# Patient Record
Sex: FEMALE | Race: WHITE | NOT HISPANIC OR LATINO | Employment: UNEMPLOYED | URBAN - NONMETROPOLITAN AREA
[De-identification: names, ages, dates, MRNs, and addresses within clinical notes are randomized per-mention and may not be internally consistent; named-entity substitution may affect disease eponyms.]

---

## 2021-03-10 ENCOUNTER — OUTSIDE FACILITY SERVICE (OUTPATIENT)
Dept: CARDIOLOGY | Facility: CLINIC | Age: 61
End: 2021-03-10

## 2021-03-10 PROCEDURE — 93018 CV STRESS TEST I&R ONLY: CPT | Performed by: INTERNAL MEDICINE

## 2021-03-24 ENCOUNTER — OFFICE VISIT (OUTPATIENT)
Dept: CARDIOLOGY | Facility: CLINIC | Age: 61
End: 2021-03-24

## 2021-03-24 VITALS
SYSTOLIC BLOOD PRESSURE: 174 MMHG | OXYGEN SATURATION: 98 % | DIASTOLIC BLOOD PRESSURE: 86 MMHG | BODY MASS INDEX: 30.83 KG/M2 | HEIGHT: 63 IN | WEIGHT: 174 LBS | HEART RATE: 88 BPM

## 2021-03-24 DIAGNOSIS — R07.2 PRECORDIAL PAIN: ICD-10-CM

## 2021-03-24 DIAGNOSIS — R00.2 PALPITATIONS: ICD-10-CM

## 2021-03-24 DIAGNOSIS — R42 DIZZY: ICD-10-CM

## 2021-03-24 DIAGNOSIS — I10 ESSENTIAL HYPERTENSION: Primary | ICD-10-CM

## 2021-03-24 PROBLEM — E53.8 COBALAMIN DEFICIENCY: Status: ACTIVE | Noted: 2021-03-24

## 2021-03-24 PROBLEM — E55.9 VITAMIN D DEFICIENCY: Status: ACTIVE | Noted: 2021-03-24

## 2021-03-24 PROBLEM — E78.2 MIXED HYPERLIPIDEMIA: Status: ACTIVE | Noted: 2021-03-24

## 2021-03-24 PROBLEM — E11.9 DIABETES MELLITUS WITHOUT COMPLICATION (HCC): Status: ACTIVE | Noted: 2019-08-28

## 2021-03-24 PROCEDURE — 99204 OFFICE O/P NEW MOD 45 MIN: CPT | Performed by: NURSE PRACTITIONER

## 2021-03-24 RX ORDER — NITROGLYCERIN 0.4 MG/1
TABLET SUBLINGUAL
Qty: 30 TABLET | Refills: 5 | Status: SHIPPED | OUTPATIENT
Start: 2021-03-24 | End: 2022-05-27 | Stop reason: SDUPTHER

## 2021-03-24 RX ORDER — POTASSIUM CHLORIDE 20 MEQ/1
TABLET, EXTENDED RELEASE ORAL
COMMUNITY
Start: 2021-01-04 | End: 2021-03-24

## 2021-03-24 RX ORDER — FUROSEMIDE 40 MG/1
40 TABLET ORAL AS NEEDED
COMMUNITY
Start: 2021-02-03 | End: 2021-05-24

## 2021-03-24 RX ORDER — CYCLOBENZAPRINE HCL 10 MG
TABLET ORAL DAILY
COMMUNITY
Start: 2021-02-03 | End: 2021-03-24 | Stop reason: SDUPTHER

## 2021-03-24 RX ORDER — ASPIRIN 81 MG/1
TABLET ORAL
COMMUNITY
Start: 2021-02-03 | End: 2021-06-04 | Stop reason: SDUPTHER

## 2021-03-24 RX ORDER — BACLOFEN 20 MG
TABLET ORAL
COMMUNITY
Start: 2021-01-04 | End: 2021-03-24

## 2021-03-24 RX ORDER — ERGOCALCIFEROL 1.25 MG/1
CAPSULE ORAL
COMMUNITY
Start: 2021-02-03 | End: 2021-03-24 | Stop reason: ALTCHOICE

## 2021-03-24 RX ORDER — SERTRALINE HYDROCHLORIDE 25 MG/1
25 TABLET, FILM COATED ORAL DAILY
COMMUNITY
End: 2021-06-04 | Stop reason: SDUPTHER

## 2021-03-24 RX ORDER — GABAPENTIN 300 MG/1
300 CAPSULE ORAL 2 TIMES DAILY
COMMUNITY
End: 2021-06-04 | Stop reason: SDUPTHER

## 2021-03-24 RX ORDER — ERGOCALCIFEROL 1.25 MG/1
50000 CAPSULE ORAL
COMMUNITY
End: 2021-05-24

## 2021-03-24 RX ORDER — ROSUVASTATIN CALCIUM 10 MG/1
10 TABLET, COATED ORAL
COMMUNITY
Start: 2021-02-03 | End: 2021-05-24 | Stop reason: SDUPTHER

## 2021-03-24 RX ORDER — FUROSEMIDE 40 MG/1
TABLET ORAL DAILY
COMMUNITY
Start: 2021-02-03 | End: 2021-03-24 | Stop reason: SDUPTHER

## 2021-03-24 RX ORDER — METOPROLOL SUCCINATE 25 MG/1
25 TABLET, EXTENDED RELEASE ORAL DAILY
Qty: 30 TABLET | Refills: 11 | Status: SHIPPED | OUTPATIENT
Start: 2021-03-24 | End: 2021-06-04 | Stop reason: SDUPTHER

## 2021-03-24 RX ORDER — AMLODIPINE BESYLATE 10 MG/1
10 TABLET ORAL DAILY
COMMUNITY
End: 2021-06-04 | Stop reason: SDUPTHER

## 2021-03-24 RX ORDER — MELOXICAM 7.5 MG/1
TABLET ORAL
COMMUNITY
Start: 2021-02-03 | End: 2021-03-24

## 2021-03-24 RX ORDER — MELOXICAM 7.5 MG/1
TABLET ORAL
COMMUNITY
Start: 2021-02-03 | End: 2021-03-24 | Stop reason: SDUPTHER

## 2021-03-24 RX ORDER — LOSARTAN POTASSIUM AND HYDROCHLOROTHIAZIDE 25; 100 MG/1; MG/1
TABLET ORAL
COMMUNITY
Start: 2021-02-03 | End: 2021-03-24 | Stop reason: SDUPTHER

## 2021-03-24 RX ORDER — ISOSORBIDE MONONITRATE 30 MG/1
30 TABLET, EXTENDED RELEASE ORAL DAILY
Qty: 30 TABLET | Refills: 11 | Status: SHIPPED | OUTPATIENT
Start: 2021-03-24 | End: 2021-04-01

## 2021-03-24 RX ORDER — UBIDECARENONE 75 MG
50 CAPSULE ORAL DAILY
COMMUNITY
End: 2021-05-24

## 2021-03-24 RX ORDER — GLIMEPIRIDE 2 MG/1
TABLET ORAL
COMMUNITY
Start: 2021-02-03 | End: 2021-03-24 | Stop reason: SDUPTHER

## 2021-03-24 RX ORDER — LOSARTAN POTASSIUM AND HYDROCHLOROTHIAZIDE 25; 100 MG/1; MG/1
1 TABLET ORAL DAILY
COMMUNITY
Start: 2021-02-03 | End: 2021-06-04 | Stop reason: SDUPTHER

## 2021-03-24 RX ORDER — GLIMEPIRIDE 2 MG/1
TABLET ORAL EVERY 12 HOURS
COMMUNITY
Start: 2021-02-03 | End: 2021-05-24

## 2021-03-24 RX ORDER — CYCLOBENZAPRINE HCL 10 MG
10 TABLET ORAL NIGHTLY
COMMUNITY
Start: 2021-02-03 | End: 2021-06-04

## 2021-03-24 RX ORDER — VITAMIN B COMPLEX
TABLET ORAL
COMMUNITY
Start: 2021-02-03 | End: 2021-03-24

## 2021-03-24 RX ORDER — OMEPRAZOLE 40 MG/1
1 CAPSULE, DELAYED RELEASE ORAL
COMMUNITY
Start: 2021-02-03 | End: 2021-06-04 | Stop reason: SDUPTHER

## 2021-03-24 RX ORDER — MELATONIN
1000 DAILY
COMMUNITY
End: 2021-05-24

## 2021-03-24 NOTE — PATIENT INSTRUCTIONS
"Fat and Cholesterol Restricted Eating Plan  Getting too much fat and cholesterol in your diet may cause health problems. Choosing the right foods helps keep your fat and cholesterol at normal levels. This can keep you from getting certain diseases.  Your doctor may recommend an eating plan that includes:  · Total fat: ______% or less of total calories a day.  · Saturated fat: ______% or less of total calories a day.  · Cholesterol: less than _________mg a day.  · Fiber: ______g a day.  What are tips for following this plan?  Meal planning  · At meals, divide your plate into four equal parts:  ? Fill one-half of your plate with vegetables and green salads.  ? Fill one-fourth of your plate with whole grains.  ? Fill one-fourth of your plate with low-fat (lean) protein foods.  · Eat fish that is high in omega-3 fats at least two times a week. This includes mackerel, tuna, sardines, and salmon.  · Eat foods that are high in fiber, such as whole grains, beans, apples, broccoli, carrots, peas, and barley.  General tips    · Work with your doctor to lose weight if you need to.  · Avoid:  ? Foods with added sugar.  ? Fried foods.  ? Foods with partially hydrogenated oils.  · Limit alcohol intake to no more than 1 drink a day for nonpregnant women and 2 drinks a day for men. One drink equals 12 oz of beer, 5 oz of wine, or 1½ oz of hard liquor.  Reading food labels  · Check food labels for:  ? Trans fats.  ? Partially hydrogenated oils.  ? Saturated fat (g) in each serving.  ? Cholesterol (mg) in each serving.  ? Fiber (g) in each serving.  · Choose foods with healthy fats, such as:  ? Monounsaturated fats.  ? Polyunsaturated fats.  ? Omega-3 fats.  · Choose grain products that have whole grains. Look for the word \"whole\" as the first word in the ingredient list.  Cooking  · Cook foods using low-fat methods. These include baking, boiling, grilling, and broiling.  · Eat more home-cooked foods. Eat at restaurants and buffets " less often.  · Avoid cooking using saturated fats, such as butter, cream, palm oil, palm kernel oil, and coconut oil.  Recommended foods    Fruits  · All fresh, canned (in natural juice), or frozen fruits.  Vegetables  · Fresh or frozen vegetables (raw, steamed, roasted, or grilled). Green salads.  Grains  · Whole grains, such as whole wheat or whole grain breads, crackers, cereals, and pasta. Unsweetened oatmeal, bulgur, barley, quinoa, or brown rice. Corn or whole wheat flour tortillas.  Meats and other protein foods  · Ground beef (85% or leaner), grass-fed beef, or beef trimmed of fat. Skinless chicken or turkey. Ground chicken or turkey. Pork trimmed of fat. All fish and seafood. Egg whites. Dried beans, peas, or lentils. Unsalted nuts or seeds. Unsalted canned beans. Nut butters without added sugar or oil.  Dairy  · Low-fat or nonfat dairy products, such as skim or 1% milk, 2% or reduced-fat cheeses, low-fat and fat-free ricotta or cottage cheese, or plain low-fat and nonfat yogurt.  Fats and oils  · Tub margarine without trans fats. Light or reduced-fat mayonnaise and salad dressings. Avocado. Olive, canola, sesame, or safflower oils.  The items listed above may not be a complete list of foods and beverages you can eat. Contact a dietitian for more information.  Foods to avoid  Fruits  · Canned fruit in heavy syrup. Fruit in cream or butter sauce. Fried fruit.  Vegetables  · Vegetables cooked in cheese, cream, or butter sauce. Fried vegetables.  Grains  · White bread. White pasta. White rice. Cornbread. Bagels, pastries, and croissants. Crackers and snack foods that contain trans fat and hydrogenated oils.  Meats and other protein foods  · Fatty cuts of meat. Ribs, chicken wings, pedro, sausage, bologna, salami, chitterlings, fatback, hot dogs, bratwurst, and packaged lunch meats. Liver and organ meats. Whole eggs and egg yolks. Chicken and turkey with skin. Fried meat.  Dairy  · Whole or 2% milk, cream,  half-and-half, and cream cheese. Whole milk cheeses. Whole-fat or sweetened yogurt. Full-fat cheeses. Nondairy creamers and whipped toppings. Processed cheese, cheese spreads, and cheese curds.  Beverages  · Alcohol. Sugar-sweetened drinks such as sodas, lemonade, and fruit drinks.  Fats and oils  · Butter, stick margarine, lard, shortening, ghee, or pedro fat. Coconut, palm kernel, and palm oils.  Sweets and desserts  · Corn syrup, sugars, honey, and molasses. Candy. Jam and jelly. Syrup. Sweetened cereals. Cookies, pies, cakes, donuts, muffins, and ice cream.  The items listed above may not be a complete list of foods and beverages you should avoid. Contact a dietitian for more information.  Summary  · Choosing the right foods helps keep your fat and cholesterol at normal levels. This can keep you from getting certain diseases.  · At meals, fill one-half of your plate with vegetables and green salads.  · Eat high-fiber foods, like whole grains, beans, apples, carrots, peas, and barley.  · Limit added sugar, saturated fats, alcohol, and fried foods.  This information is not intended to replace advice given to you by your health care provider. Make sure you discuss any questions you have with your health care provider.  Document Revised: 08/21/2019 Document Reviewed: 09/04/2018  [employer] Patient Education © 2021 [employer]  BMI for Adults  What is BMI?  Body mass index (BMI) is a number that is calculated from a person's weight and height. BMI can help estimate how much of a person's weight is composed of fat. BMI does not measure body fat directly. Rather, it is an alternative to procedures that directly measure body fat, which can be difficult and expensive.  BMI can help identify people who may be at higher risk for certain medical problems.  What are BMI measurements used for?  BMI is used as a screening tool to identify possible weight problems. It helps determine whether a person is obese, overweight, a  "healthy weight, or underweight.  BMI is useful for:  · Identifying a weight problem that may be related to a medical condition or may increase the risk for medical problems.  · Promoting changes, such as changes in diet and exercise, to help reach a healthy weight. BMI screening can be repeated to see if these changes are working.  How is BMI calculated?  BMI involves measuring your weight in relation to your height. Both height and weight are measured, and the BMI is calculated from those numbers. This can be done either in English ([place] or metric measurements. Note that charts and online BMI calculators are available to help you find your BMI quickly and easily without having to do these calculations yourself.  To calculate your BMI in English ([place] measurements:    1. Measure your weight in pounds (lb).  2. Multiply the number of pounds by 703.  ? For example, for a person who weighs 180 lb, multiply that number by 703, which equals 126,540.  3. Measure your height in inches. Then multiply that number by itself to get a measurement called \"inches squared.\"  ? For example, for a person who is 70 inches tall, the \"inches squared\" measurement is 70 inches x 70 inches, which equals 4,900 inches squared.  4. Divide the total from step 2 (number of lb x 703) by the total from step 3 (inches squared): 126,540 ÷ 4,900 = 25.8. This is your BMI.  To calculate your BMI in metric measurements:  1. Measure your weight in kilograms (kg).  2. Measure your height in meters (m). Then multiply that number by itself to get a measurement called \"meters squared.\"  ? For example, for a person who is 1.75 m tall, the \"meters squared\" measurement is 1.75 m x 1.75 m, which is equal to 3.1 meters squared.  3. Divide the number of kilograms (your weight) by the meters squared number. In this example: 70 ÷ 3.1 = 22.6. This is your BMI.  What do the results mean?  BMI charts are used to identify whether you are underweight, normal weight, " overweight, or obese. The following guidelines will be used:  · Underweight: BMI less than 18.5.  · Normal weight: BMI between 18.5 and 24.9.  · Overweight: BMI between 25 and 29.9.  · Obese: BMI of 30 or above.  Keep these notes in mind:  · Weight includes both fat and muscle, so someone with a muscular build, such as an athlete, may have a BMI that is higher than 24.9. In cases like these, BMI is not an accurate measure of body fat.  · To determine if excess body fat is the cause of a BMI of 25 or higher, further assessments may need to be done by a health care provider.  · BMI is usually interpreted in the same way for men and women.  Where to find more information  For more information about BMI, including tools to quickly calculate your BMI, go to these websites:  · Centers for Disease Control and Prevention: www.cdc.gov  · American Heart Association: www.heart.org  · National Heart, Lung, and Blood [place]: www.nhlbi.nih.gov  Summary  · Body mass index (BMI) is a number that is calculated from a person's weight and height.  · BMI may help estimate how much of a person's weight is composed of fat. BMI can help identify those who may be at higher risk for certain medical problems.  · BMI can be measured using English measurements or metric measurements.  · BMI charts are used to identify whether you are underweight, normal weight, overweight, or obese.  This information is not intended to replace advice given to you by your health care provider. Make sure you discuss any questions you have with your health care provider.  Document Revised: 09/09/2020 Document Reviewed: 07/17/2020  [employer] Patient Education © 2021 [employer]    Acute Coronary Syndrome  Acute coronary syndrome (ACS) is a serious problem in which there is suddenly not enough blood and oxygen reaching the heart. ACS can result in chest pain or a heart attack.  This condition is a medical emergency. If you have any symptoms of this condition,  get help right away.  What are the causes?  This condition may be caused by:  · A buildup of fat and cholesterol inside the arteries (atherosclerosis). This is the most common cause. The buildup (plaque) can cause blood vessels in the heart (coronary arteries) to become narrow or blocked, which reduces blood flow to the heart. Plaque can also break off and lead to a clot, which can block an artery and cause a heart attack or stroke.  · Sudden tightening of the muscles around the coronary arteries (coronary spasm).  · Tearing of a coronary artery (spontaneous coronary artery dissection).  · Very low blood pressure (hypotension).  · An abnormal heartbeat (arrhythmia).  · Other medical conditions that cause a decrease of oxygen to the heart, such as anemiaorrespiratory failure.  · Using cocaine or methamphetamine.  What increases the risk?  The following factors may make you more likely to develop this condition:  · Age. The risk for ACS increases as you get older.  · History of chest pain, heart attack, peripheral artery disease, or stroke.  · Having taken chemotherapy or immune-suppressing medicines.  · Being male.  · Family history of chest pain, heart disease, or stroke.  · Smoking.  · Not exercising enough.  · Being overweight.  · High cholesterol.  · High blood pressure (hypertension).  · Diabetes.  · Excessive alcohol use.  What are the signs or symptoms?  Common symptoms of this condition include:  · Chest pain. The pain may last a long time, or it may stop and come back (recur). It may feel like:  ? Crushing or squeezing.  ? Tightness, pressure, fullness, or heaviness.  · Arm, neck, jaw, or back pain.  · Heartburn or indigestion.  · Shortness of breath.  · Nausea.  · Sudden cold sweats.  · Light-headedness.  · Dizziness or passing out.  · Tiredness (fatigue).  Sometimes there are no symptoms.  How is this diagnosed?  This condition may be diagnosed based on:  · Your medical history and symptoms.  · Imaging  tests, such as:  ? An electrocardiogram (ECG). This measures the heart's electrical activity.  ? X-rays.  ? CT scan.  ? A coronary angiogram. For this test, dye is injected into the heart arteries and then X-rays are taken.  ? Myocardial perfusion imaging. This test shows how well blood flows through your heart muscle.  · Blood tests. These may be repeated at certain time intervals.  · Exercise stress testing.  · Echocardiogram. This is a test that uses sound waves to produce detailed images of the heart.  How is this treated?  Treatment for this condition may include:  · Oxygen therapy.  · Medicines, such as:  ? Antiplatelet medicines and blood-thinning medicines, such as aspirin. These help prevent blood clots.  ? Medicine that dissolves any blood clots (fibrinolytic therapy).  ? Blood pressure medicines.  ? Nitroglycerin. This helps widen blood vessels to improve blood flow.  ? Pain medicine.  ? Cholesterol-lowering medicine.  · Surgery, such as:  ? Coronary angioplasty with stent placement. This involves placing a small piece of metal that looks like mesh or a spring into a narrow coronary artery. This widens the artery and keeps it open.  ? Coronary artery bypass surgery. This involves taking a section of a blood vessel from a different part of your body and placing it on the blocked coronary artery to allow blood to flow around the blockage.  · Cardiac rehabilitation. This is a program that includes exercise training, education, and counseling to help you recover.  Follow these instructions at home:  Eating and drinking  · Eat a heart-healthy diet that includes whole grains, fruits and vegetables, lean proteins, and low-fat or nonfat dairy products.  · Limit how much salt (sodium) you eat as told by your health care provider. Follow instructions from your health care provider about any other eating or drinking restrictions, such as limiting foods that are high in fat and processed sugars.  · Use healthy  cooking methods such as roasting, grilling, broiling, baking, poaching, steaming, or stir-frying.  · Work with a dietitian to follow a heart-healthy eating plan.  Medicines  · Take over-the-counter and prescription medicines only as told by your health care provider.  · Do not take these medicines unless your health care provider approves:  ? Vitamin supplements that contain vitamin A or vitamin E.  ? NSAIDs, such as ibuprofen, naproxen, or celecoxib.  ? Hormone replacement therapy that contains estrogen.  · If you are taking blood thinners:  ? Talk with your health care provider before you take any medicines that contain aspirin or NSAIDs. These medicines increase your risk for dangerous bleeding.  ? Take your medicine exactly as told, at the same time every day.  ? Avoid activities that could cause injury or bruising, and follow instructions about how to prevent falls.  ? Wear a medical alert bracelet, and carry a card that lists what medicines you take.  Activity  · Follow your cardiac rehabilitation program. Do exercises as told by your physical therapist.  · Ask your health care provider what activities and exercises are safe for you. Follow his or her instructions about lifting, driving, or climbing stairs.  Lifestyle  · Do not use any products that contain nicotine or tobacco, such as cigarettes, e-cigarettes, and chewing tobacco. If you need help quitting, ask your health care provider.  · Do not drink alcohol if your health care provider tells you not to drink.  · If you drink alcohol:  ? Limit how much you have to 0-1 drink a day.  ? Be aware of how much alcohol is in your drink. In the [place] one drink equals one 12 oz bottle of beer (355 mL), one 5 oz glass of wine (148 mL), or one 1½ oz glass of hard liquor (44 mL).  · Maintain a healthy weight. If you need to lose weight, work with your health care provider to do so safely.  General instructions  · Tell all the health care providers who provide care for  you about your heart condition, including your dentist. This may affect the medicines or treatment you receive.  · Manage any other health conditions you have, such as hypertension or diabetes. These conditions affect your heart.  · Pay attention to your mental health. You may be at higher risk for depression.  ? Find ways to manage stress.  ? Talk to your health care provider about depression screening and treatment.  · Keep your vaccinations up to date.  ? Get the flu shot (influenza vaccine) every year.  ? Get the pneumococcal vaccine if you are age 65 or older.  · If directed, monitor your blood pressure at home.  · Keep all follow-up visits as told by your health care provider. This is important.  Contact a health care provider if you:  · Feel overwhelmed or sad.  · Have trouble doing your daily activities.  Get help right away if you:  · Have pain in your chest, neck, arm, jaw, stomach, or back that recurs, and:  ? It lasts for more than a few minutes.  ? It is not relieved by taking the medicineyour health care provider prescribed.  · Have unexplained:  ? Heavy sweating.  ? Heartburn or indigestion.  ? Nausea or vomiting.  ? Shortness of breath.  ? Difficulty breathing.  ? Fatigue.  ? Nervousness or anxiety.  ? Weakness.  ? Diarrhea.  ? Dark stools or blood in your stool.  · Have sudden light-headedness or dizziness.  · Have blood pressure that is higher than 180/120.  · Faint.  · Have thoughts about hurting yourself.  These symptoms may represent a serious problem that is an emergency. Do not wait to see if the symptoms will go away. Get medical help right away. Call your local emergency services (911 in the [place] Do not drive yourself to the hospital.   Summary  · Acute coronary syndrome (ACS) is when there is not enough blood and oxygen being supplied to the heart. ACS can result in chest pain or a heart attack.  · Acute coronary syndrome is a medical emergency. If you have any symptoms of this condition,  get help right away.  · Treatment includes medicines and procedures to open the blocked arteries and restore blood flow.  This information is not intended to replace advice given to you by your health care provider. Make sure you discuss any questions you have with your health care provider.  Document Revised: 05/20/2020 Document Reviewed: 12/30/2019  [employer] Patient Education © 2021 [employer]      Hypertension, Adult  Hypertension is another name for high blood pressure. High blood pressure forces your heart to work harder to pump blood. This can cause problems over time.  There are two numbers in a blood pressure reading. There is a top number (systolic) over a bottom number (diastolic). It is best to have a blood pressure that is below 120/80. Healthy choices can help lower your blood pressure, or you may need medicine to help lower it.  What are the causes?  The cause of this condition is not known. Some conditions may be related to high blood pressure.  What increases the risk?  · Smoking.  · Having type 2 diabetes mellitus, high cholesterol, or both.  · Not getting enough exercise or physical activity.  · Being overweight.  · Having too much fat, sugar, calories, or salt (sodium) in your diet.  · Drinking too much alcohol.  · Having long-term (chronic) kidney disease.  · Having a family history of high blood pressure.  · Age. Risk increases with age.  · Race. You may be at higher risk if you are .  · Gender. Men are at higher risk than women before age 45. After age 65, women are at higher risk than men.  · Having obstructive sleep apnea.  · Stress.  What are the signs or symptoms?  · High blood pressure may not cause symptoms. Very high blood pressure (hypertensive crisis) may cause:  ? Headache.  ? Feelings of worry or nervousness (anxiety).  ? Shortness of breath.  ? Nosebleed.  ? A feeling of being sick to your stomach (nausea).  ? Throwing up (vomiting).  ? Changes in how you  see.  ? Very bad chest pain.  ? Seizures.  How is this treated?  · This condition is treated by making healthy lifestyle changes, such as:  ? Eating healthy foods.  ? Exercising more.  ? Drinking less alcohol.  · Your health care provider may prescribe medicine if lifestyle changes are not enough to get your blood pressure under control, and if:  ? Your top number is above 130.  ? Your bottom number is above 80.  · Your personal target blood pressure may vary.  Follow these instructions at home:  Eating and drinking    · If told, follow the DASH eating plan. To follow this plan:  ? Fill one half of your plate at each meal with fruits and vegetables.  ? Fill one fourth of your plate at each meal with whole grains. Whole grains include whole-wheat pasta, brown rice, and whole-grain bread.  ? Eat or drink low-fat dairy products, such as skim milk or low-fat yogurt.  ? Fill one fourth of your plate at each meal with low-fat (lean) proteins. Low-fat proteins include fish, chicken without skin, eggs, beans, and tofu.  ? Avoid fatty meat, cured and processed meat, or chicken with skin.  ? Avoid pre-made or processed food.  · Eat less than 1,500 mg of salt each day.  · Do not drink alcohol if:  ? Your doctor tells you not to drink.  ? You are pregnant, may be pregnant, or are planning to become pregnant.  · If you drink alcohol:  ? Limit how much you use to:  § 0-1 drink a day for women.  § 0-2 drinks a day for men.  ? Be aware of how much alcohol is in your drink. In the [place] one drink equals one 12 oz bottle of beer (355 mL), one 5 oz glass of wine (148 mL), or one 1½ oz glass of hard liquor (44 mL).  Lifestyle    · Work with your doctor to stay at a healthy weight or to lose weight. Ask your doctor what the best weight is for you.  · Get at least 30 minutes of exercise most days of the week. This may include walking, swimming, or biking.  · Get at least 30 minutes of exercise that strengthens your muscles (resistance  exercise) at least 3 days a week. This may include lifting weights or doing Pilates.  · Do not use any products that contain nicotine or tobacco, such as cigarettes, e-cigarettes, and chewing tobacco. If you need help quitting, ask your doctor.  · Check your blood pressure at home as told by your doctor.  · Keep all follow-up visits as told by your doctor. This is important.  Medicines  · Take over-the-counter and prescription medicines only as told by your doctor. Follow directions carefully.  · Do not skip doses of blood pressure medicine. The medicine does not work as well if you skip doses. Skipping doses also puts you at risk for problems.  · Ask your doctor about side effects or reactions to medicines that you should watch for.  Contact a doctor if you:  · Think you are having a reaction to the medicine you are taking.  · Have headaches that keep coming back (recurring).  · Feel dizzy.  · Have swelling in your ankles.  · Have trouble with your vision.  Get help right away if you:  · Get a very bad headache.  · Start to feel mixed up (confused).  · Feel weak or numb.  · Feel faint.  · Have very bad pain in your:  ? Chest.  ? Belly (abdomen).  · Throw up more than once.  · Have trouble breathing.  Summary  · Hypertension is another name for high blood pressure.  · High blood pressure forces your heart to work harder to pump blood.  · For most people, a normal blood pressure is less than 120/80.  · Making healthy choices can help lower blood pressure. If your blood pressure does not get lower with healthy choices, you may need to take medicine.  This information is not intended to replace advice given to you by your health care provider. Make sure you discuss any questions you have with your health care provider.  Document Revised: 08/28/2019 Document Reviewed: 08/28/2019  [employer] Patient Education © 2021 [employer]      Palpitations  Palpitations are feelings that your heartbeat is not normal. Your heartbeat  may feel like it is:  · Uneven.  · Faster than normal.  · Fluttering.  · Skipping a beat.  This is usually not a serious problem. In some cases, you may need tests to rule out any serious problems.  Follow these instructions at home:  Pay attention to any changes in your condition. Take these actions to help manage your symptoms:  Eating and drinking  · Avoid:  ? Coffee, tea, soft drinks, and energy drinks.  ? Chocolate.  ? Alcohol.  ? Diet pills.  Lifestyle    · Try to lower your stress. These things can help you relax:  ? Yoga.  ? Deep breathing and meditation.  ? Exercise.  ? Using words and images to create positive thoughts (guided imagery).  ? Using your mind to control things in your body (biofeedback).  · Do not use drugs.  · Get plenty of rest and sleep. Keep a regular bed time.  General instructions    · Take over-the-counter and prescription medicines only as told by your doctor.  · Do not use any products that contain nicotine or tobacco, such as cigarettes and e-cigarettes. If you need help quitting, ask your doctor.  · Keep all follow-up visits as told by your doctor. This is important. You may need more tests if palpitations do not go away or get worse.  Contact a doctor if:  · Your symptoms last more than 24 hours.  · Your symptoms occur more often.  Get help right away if you:  · Have chest pain.  · Feel short of breath.  · Have a very bad headache.  · Feel dizzy.  · Pass out (faint).  Summary  · Palpitations are feelings that your heartbeat is uneven or faster than normal. It may feel like your heart is fluttering or skipping a beat.  · Avoid food and drinks that may cause palpitations. These include caffeine, chocolate, and alcohol.  · Try to lower your stress. Do not smoke or use drugs.  · Get help right away if you faint or have chest pain, shortness of breath, a severe headache, or dizziness.  This information is not intended to replace advice given to you by your health care provider. Make  sure you discuss any questions you have with your health care provider.  Document Revised: 01/30/2019 Document Reviewed: 01/30/2019  Elsevier Patient Education © 2021 [employer]

## 2021-03-24 NOTE — PROGRESS NOTES
Subjective     Ernestina Wisdom is a 60 y.o. female who presents to [facility] for New Patient and Hypertension.    CHIEF COMPLIANT  Chief Complaint   Patient presents with   • New Patient   • Hypertension       Active Problems:  Problem List Items Addressed This Visit     None      Visit Diagnoses     Essential hypertension    -  Primary    Relevant Medications    furosemide (LASIX) 40 MG tablet    losartan-hydrochlorothiazide (HYZAAR) 100-25 MG per tablet    amLODIPine (NORVASC) 10 MG tablet    nitroglycerin (NITROSTAT) 0.4 MG SL tablet    isosorbide mononitrate (IMDUR) 30 MG 24 hr tablet    metoprolol succinate XL (TOPROL-XL) 25 MG 24 hr tablet    Precordial pain        Relevant Medications    nitroglycerin (NITROSTAT) 0.4 MG SL tablet    isosorbide mononitrate (IMDUR) 30 MG 24 hr tablet    Palpitations        Relevant Medications    nitroglycerin (NITROSTAT) 0.4 MG SL tablet    isosorbide mononitrate (IMDUR) 30 MG 24 hr tablet    metoprolol succinate XL (TOPROL-XL) 25 MG 24 hr tablet    Other Relevant Orders    Cardiac Event Monitor    Dizzy        Relevant Orders    Cardiac Event Monitor      Problem list  1.  Chest pain  2.  Shortness of air  3 palpitations  4.  Strokelike symptoms evaluated at [facility]  Ms. Carlisle is a 60-year-old female patient who is being seen today to establish care for chest pain, shortness of breath, and hypertension.  Patient does report chest pain that started approximately 1 month ago.  At this time she was evaluated at [facility] and at [facility] for chest pain, shortness of air, palpitations, and strokelike symptoms with facial numbness and dizziness as well as slurred speech.  Does complete records from the [facility] are not available to us at this time.  She reports that everything came back negative and that they told her that it was mainly associated with stress.  Patient reports ever since she has had chest pain that occurs in the left breast area that seems to be  constant but varies in intensity.  She describes it as a tightness like pressure sensation.  It significantly increases with activity and she does have associated nausea and diaphoresis.  When it happens she says that she has to lay down and waited out.  She says that the chest pain is nowhere near as bad as she does not exert herself and does not do a whole lot.  She was given nitroglycerin in the emergency department which did take her pain away and gave her complete relief for short period of time.  However ever since it has never went completely away and exacerbates with even minimal activities.  She also describes it almost feels like she has air bubbles in her chest at times.  She also reports shortness of breath of the same period of time that is worse with exertion however she does not experience any shortness of breath with rest.  She says basic household chores such as dusting causes her to become dyspneic.  This is intermittent in nature and when it does occur she has to sit down and rest and allow it to resolve.  Patient also reports palpitations in which she says is like a fluttering or jerking on the inside that are intermittent and it even wakes her up from sleep.  It is also followed by pain in her left chest.  She also has associated nausea and this does occur on a daily basis.  She says when it does occur it does not interfere with her activities of daily living and that she has to stop what she does in order to get it to stop.  Patient also has persistent constant dizziness that occurs all the time.  She said it may be associated with her sugar and during this time her hand feels numb.  She did recently go under stroke work-up at [facility] and this is similar to why she is worked up.  That has not completely resolved.  She also reports waking up diaphoretic which she is never done before this has been happening over the last month.  She says she absolutely had hardly any if no problems  before approximately 1 month ago.  She reports no history of Covid.  We did review the labs from [facility] which showed a negative stress test with preserved ejection fraction and a relatively unremarkable echocardiogram.  Her labs showed that her cholesterol was within range with an HDL 58 and LDL of 82 however her triglycerides were elevated at 175.  She had normal renal and liver function.  She also has chronic arterial hypertension which her blood pressure is up at 174/86 heart rate of 88.  She says it fluctuates a lot and that is significantly increases with any type of activity.  She is currently on Hyzaar and amlodipine for blood pressure.  She was followed up by her PCP this morning and her amlodipine was increased from 5 mg to 10 mg daily.  She reports that she is going to be going to [place] with her son to try to get away from the stress here.  She will be gone to the beginning of May.  We will follow her up when she gets back.  She denies any syncope, PND, orthopnea,.  PRIOR MEDS  Current Outpatient Medications on File Prior to Visit   Medication Sig Dispense Refill   • amLODIPine (NORVASC) 10 MG tablet Take 10 mg by mouth Daily.     • aspirin (aspirin) 81 MG EC tablet Take  by mouth.     • BIOTIN PO Take  by mouth Daily.     • cholecalciferol (VITAMIN D3) 25 MCG (1000 UT) tablet Take 1,000 Units by mouth Daily.     • cyclobenzaprine (FLEXERIL) 10 MG tablet Take 10 mg by mouth Every Night.     • Docusate Sodium (STOOL SOFTENER LAXATIVE PO) Take  by mouth Daily.     • furosemide (LASIX) 40 MG tablet Take 40 mg by mouth As Needed.     • gabapentin (NEURONTIN) 300 MG capsule Take 300 mg by mouth 2 (two) times a day.     • glimepiride (Amaryl) 2 MG tablet Take  by mouth Every 12 (Twelve) Hours.     • losartan-hydrochlorothiazide (HYZAAR) 100-25 MG per tablet Take 1 tablet by mouth Daily.     • metFORMIN (GLUCOPHAGE) 500 MG tablet Take  by mouth 2 (Two) Times a Day With Meals.     • omeprazole (priLOSEC) 40  MG capsule Take 1 capsule by mouth.     • rosuvastatin (CRESTOR) 10 MG tablet Take 10 mg by mouth.     • sertraline (ZOLOFT) 25 MG tablet Take 25 mg by mouth Daily.     • vitamin B-12 (CYANOCOBALAMIN) 100 MCG tablet Take 50 mcg by mouth Daily.     • vitamin D (ERGOCALCIFEROL) 1.25 MG (47281 UT) capsule capsule Take 50,000 Units by mouth Every 7 (Seven) Days.     • [DISCONTINUED] cyclobenzaprine (FLEXERIL) 10 MG tablet Take  by mouth Daily.     • [DISCONTINUED] Cyanocobalamin 2500 MCG sublingual tablet once a day     • [DISCONTINUED] furosemide (Lasix) 40 MG tablet Take  by mouth Daily.     • [DISCONTINUED] glimepiride (AMARYL) 2 MG tablet      • [DISCONTINUED] linaclotide (Linzess) 145 MCG capsule capsule Take 1 capsule by mouth.     • [DISCONTINUED] losartan-hydrochlorothiazide (Hyzaar) 100-25 MG per tablet Take  by mouth.     • [DISCONTINUED] Magnesium Oxide 500 MG tablet      • [DISCONTINUED] meloxicam (MOBIC) 7.5 MG tablet      • [DISCONTINUED] meloxicam (Mobic) 7.5 MG tablet Take  by mouth.     • [DISCONTINUED] potassium chloride (K-DUR,KLOR-CON) 20 MEQ CR tablet      • [DISCONTINUED] vitamin D (ERGOCALCIFEROL) 1.25 MG (71591 UT) capsule capsule        No current facility-administered medications on file prior to visit.       ALLERGIES  Empagliflozin, Canagliflozin, and Atorvastatin calcium    HISTORY  Past Medical History:   Diagnosis Date   • Diabetes mellitus (CMS/[facility])    • Hyperlipidemia    • Hypertension    • Leaky heart valve        Social History     Socioeconomic History   • Marital status:      Spouse name: Not on file   • Number of children: Not on file   • Years of education: Not on file   • Highest education level: Not on file   Tobacco Use   • Smoking status: Never Smoker   • Smokeless tobacco: Never Used   Substance and Sexual Activity   • Alcohol use: Never   • Drug use: Never   • Sexual activity: Defer       Family History   Problem Relation Age of Onset   • Hypertension Mother    • Heart  "failure Mother    • Hypertension Father    • Cancer Father    • Heart failure Father        Review of Systems   Constitutional: Positive for chills. Negative for fatigue and fever.   HENT: Negative.  Negative for congestion, sinus pressure and sore throat.    Eyes: Positive for visual disturbance (glasses).   Respiratory: Positive for chest tightness (tightness and pressure all the time) and shortness of breath (at times).    Cardiovascular: Positive for palpitations (flutters, jerks at times) and leg swelling. Negative for chest pain.   Gastrointestinal: Positive for constipation. Negative for abdominal pain, blood in stool, diarrhea, nausea and vomiting.   Endocrine: Positive for cold intolerance. Negative for heat intolerance.   Genitourinary: Negative.  Negative for dysuria, frequency, hematuria and urgency.   Musculoskeletal: Positive for arthralgias. Negative for back pain and neck pain.   Skin: Negative.  Negative for rash.   Allergic/Immunologic: Negative.  Negative for environmental allergies and food allergies.   Neurological: Positive for dizziness (all the time). Negative for syncope and light-headedness.   Hematological: Bruises/bleeds easily.   Psychiatric/Behavioral: Positive for sleep disturbance (wakes with heart beating out of chest, denies soa or cp).       Objective     VITALS: /86 (BP Location: Left arm, Patient Position: Sitting)   Pulse 88   Ht 160 cm (63\")   Wt 78.9 kg (174 lb)   SpO2 98%   BMI 30.82 kg/m²     LABS:   Lab Results (most recent)     None          IMAGING:   No Images in the past 120 days found..    EXAM:  Physical Exam  Vitals and nursing note reviewed.   Constitutional:       Appearance: She is well-developed.   HENT:      Head: Normocephalic and atraumatic.   Eyes:      Pupils: Pupils are equal, round, and reactive to light.   Neck:      Thyroid: No thyroid mass.      Vascular: No carotid bruit or JVD.      Trachea: Trachea and phonation normal.   Cardiovascular: "      Rate and Rhythm: Normal rate and regular rhythm.      Pulses:           Carotid pulses are 2+ on the right side and 2+ on the left side.       Radial pulses are 2+ on the right side and 2+ on the left side.        Dorsalis pedis pulses are 2+ on the right side and 2+ on the left side.        Posterior tibial pulses are 2+ on the right side and 2+ on the left side.      Heart sounds: Normal heart sounds. No murmur heard.   No friction rub. No gallop.    Pulmonary:      Effort: Pulmonary effort is normal. No respiratory distress.      Breath sounds: Normal breath sounds. No wheezing or rales.   Abdominal:      General: Bowel sounds are normal. There is no distension or abdominal bruit.      Palpations: Abdomen is soft.      Tenderness: There is no abdominal tenderness.   Musculoskeletal:         General: Swelling (trace) present. Normal range of motion.      Cervical back: Neck supple.   Skin:     General: Skin is warm and dry.      Capillary Refill: Capillary refill takes less than 2 seconds.      Findings: No rash.   Neurological:      Mental Status: She is alert and oriented to person, place, and time.      Cranial Nerves: No cranial nerve deficit.      Sensory: No sensory deficit.   Psychiatric:         Speech: Speech normal.         Behavior: Behavior normal.         Thought Content: Thought content normal.         Judgment: Judgment normal.         Procedure   Procedures       Assessment/Plan    Diagnosis Plan   1. Essential hypertension  nitroglycerin (NITROSTAT) 0.4 MG SL tablet    isosorbide mononitrate (IMDUR) 30 MG 24 hr tablet   2. Precordial pain  nitroglycerin (NITROSTAT) 0.4 MG SL tablet    isosorbide mononitrate (IMDUR) 30 MG 24 hr tablet   3. Palpitations  nitroglycerin (NITROSTAT) 0.4 MG SL tablet    isosorbide mononitrate (IMDUR) 30 MG 24 hr tablet    Cardiac Event Monitor    metoprolol succinate XL (TOPROL-XL) 25 MG 24 hr tablet   4. Dizzy  Cardiac Event Monitor   1.  Patient does have chronic  arterial hypertension which her blood pressure is elevated today 174/86 she is usually between 140 and 150 for the blood pressure log in which she was able to provide to us today.  She was seen by her PCP this morning and they had increased her amlodipine from 5 mg to 10 mg.  Patient was advised to continue monitor blood pressure on a routine basis reporting significant highs or lows we did set a goal blood pressure 130/80 or less.  2.  Patient continues to have chest pain shortness of breath and palpitations.  Due to the fact that her chest pain was relieved with nitroglycerin I do think it is appropriate to prescribe nitroglycerin as needed as needed for chest pain patient was prescribed sublingual nitroglycerin and advised he can take up to 3 doses 5 minutes apart and if pain is not relieved after the third dose go to the emergency department.  I would also like to prescribe isosorbide to see if we can reduce the occurrence of chest pain.  Despite negative stress test and unremarkable echocardiogram with preserved ejection fraction I do think that if she does not have significant reduction of symptoms with medication therapy that we must move on with further ischemic work-up.  We did discuss having a low threshold to moving forward with left heart catheterization.  3.  Patient also reports palpitations which are occurring on a daily basis and impacting her quality of life.  Therefore I would like patient to wear a 14-day cardiac event monitor to determine the etiology of her palpitations.  I also like to start patient on metoprolol 25 mg daily to see if we can mitigate these palpitations as well.  4.  Patient does experience dizziness and facial numbness which she has been evaluated at [facility] which is was occurring at that time 2.  Those records are unavailable to us.  If she did not have a carotid duplex done at that time I would like to further evaluate with a carotid duplex.  5.  Informed of signs  and symptoms of ACS and advised to seek emergent treatment for any new worsening symptoms.  Patient also advised sooner follow-up as needed.  Also advised to follow-up with family doctor as needed  This note is dictated utilizing voice recognition software.  Although this record has been proof read, transcriptional errors may still be present. If questions occur regarding the content of this record please do not hesitate to call our office.  I have reviewed and confirmed the accuracy of the ROS as documented by the MA/LPN/RN GRICELDA Sánchez    Return if symptoms worsen or fail to improve, for WHen patient returns from [place].    Diagnoses and all orders for this visit:    1. Essential hypertension (Primary)  -     nitroglycerin (NITROSTAT) 0.4 MG SL tablet; 1 under the tongue as needed for angina, may repeat q5mins for up three doses  Dispense: 30 tablet; Refill: 5  -     isosorbide mononitrate (IMDUR) 30 MG 24 hr tablet; Take 1 tablet by mouth Daily.  Dispense: 30 tablet; Refill: 11    2. Precordial pain  -     nitroglycerin (NITROSTAT) 0.4 MG SL tablet; 1 under the tongue as needed for angina, may repeat q5mins for up three doses  Dispense: 30 tablet; Refill: 5  -     isosorbide mononitrate (IMDUR) 30 MG 24 hr tablet; Take 1 tablet by mouth Daily.  Dispense: 30 tablet; Refill: 11    3. Palpitations  -     nitroglycerin (NITROSTAT) 0.4 MG SL tablet; 1 under the tongue as needed for angina, may repeat q5mins for up three doses  Dispense: 30 tablet; Refill: 5  -     isosorbide mononitrate (IMDUR) 30 MG 24 hr tablet; Take 1 tablet by mouth Daily.  Dispense: 30 tablet; Refill: 11  -     Cardiac Event Monitor; Future  -     metoprolol succinate XL (TOPROL-XL) 25 MG 24 hr tablet; Take 1 tablet by mouth Daily.  Dispense: 30 tablet; Refill: 11    4. Dizzy  -     Cardiac Event Monitor; Future        Ernestina Wisdom  reports that she has never smoked. She has never used smokeless tobacco..         Patient's Body  mass index is 30.82 kg/m². BMI is above normal parameters. Recommendations include: educational material.           MEDS ORDERED DURING VISIT:  New Medications Ordered This Visit   Medications   • nitroglycerin (NITROSTAT) 0.4 MG SL tablet     Si under the tongue as needed for angina, may repeat q5mins for up three doses     Dispense:  30 tablet     Refill:  5   • isosorbide mononitrate (IMDUR) 30 MG 24 hr tablet     Sig: Take 1 tablet by mouth Daily.     Dispense:  30 tablet     Refill:  11   • metoprolol succinate XL (TOPROL-XL) 25 MG 24 hr tablet     Sig: Take 1 tablet by mouth Daily.     Dispense:  30 tablet     Refill:  11           This document has been electronically signed by Bon Garcia Jr., APRN  2021 12:43 EDT

## 2021-04-01 ENCOUNTER — TELEPHONE (OUTPATIENT)
Dept: CARDIOLOGY | Facility: CLINIC | Age: 61
End: 2021-04-01

## 2021-04-01 DIAGNOSIS — R07.2 PRECORDIAL PAIN: Primary | ICD-10-CM

## 2021-04-01 RX ORDER — RANOLAZINE 500 MG/1
500 TABLET, EXTENDED RELEASE ORAL 2 TIMES DAILY
Qty: 60 TABLET | Refills: 11 | Status: SHIPPED | OUTPATIENT
Start: 2021-04-01 | End: 2021-06-04 | Stop reason: SDUPTHER

## 2021-04-01 NOTE — TELEPHONE ENCOUNTER
"Pt LVM stating that she had issues w/ Isosorbide and \"reacted to it real real bad.\" Stated she stopped taking it. Stated \"It almost did me in, got real real sick with it.\" Stated she wants to know if she should be taking something else.   "

## 2021-04-02 NOTE — TELEPHONE ENCOUNTER
Bon Garcia APRN [place], Melissa, LPN 16 hours ago (4:36 PM)     Patient and Ranexa to replace the isosorbide.  He was 500 mg twice daily and without anginal    Message text      Called patient and told her the change in medication and that it was already called into the pharmacy. She acknowledge understanding and would  today.   Daiana Louis, Jazmine Rep

## 2021-05-24 ENCOUNTER — OFFICE VISIT (OUTPATIENT)
Dept: FAMILY MEDICINE CLINIC | Facility: CLINIC | Age: 61
End: 2021-05-24

## 2021-05-24 VITALS
RESPIRATION RATE: 20 BRPM | HEIGHT: 63 IN | DIASTOLIC BLOOD PRESSURE: 76 MMHG | SYSTOLIC BLOOD PRESSURE: 150 MMHG | BODY MASS INDEX: 29.88 KG/M2 | HEART RATE: 77 BPM | OXYGEN SATURATION: 99 % | TEMPERATURE: 97.1 F | WEIGHT: 168.6 LBS

## 2021-05-24 DIAGNOSIS — Z13.820 ENCOUNTER FOR SCREENING FOR OSTEOPOROSIS: ICD-10-CM

## 2021-05-24 DIAGNOSIS — Z00.00 ENCOUNTER FOR MEDICAL EXAMINATION TO ESTABLISH CARE: Primary | ICD-10-CM

## 2021-05-24 DIAGNOSIS — E55.9 VITAMIN D DEFICIENCY: ICD-10-CM

## 2021-05-24 DIAGNOSIS — E11.9 DIABETES MELLITUS WITHOUT COMPLICATION (HCC): ICD-10-CM

## 2021-05-24 DIAGNOSIS — E78.2 MIXED HYPERLIPIDEMIA: ICD-10-CM

## 2021-05-24 DIAGNOSIS — F43.21 GRIEF REACTION: ICD-10-CM

## 2021-05-24 DIAGNOSIS — Z12.31 ENCOUNTER FOR SCREENING MAMMOGRAM FOR BREAST CANCER: ICD-10-CM

## 2021-05-24 DIAGNOSIS — I10 BENIGN HYPERTENSION: ICD-10-CM

## 2021-05-24 DIAGNOSIS — Z12.11 SCREENING FOR COLON CANCER: ICD-10-CM

## 2021-05-24 PROBLEM — R25.2 LEG CRAMPING: Status: ACTIVE | Noted: 2021-05-24

## 2021-05-24 PROBLEM — G25.81 RESTLESS LEG SYNDROME: Status: ACTIVE | Noted: 2021-05-24

## 2021-05-24 PROBLEM — G57.93 NEUROPATHY INVOLVING BOTH LOWER EXTREMITIES: Status: ACTIVE | Noted: 2021-05-24

## 2021-05-24 PROBLEM — M79.89 LOCALIZED SWELLING OF LOWER EXTREMITY: Status: ACTIVE | Noted: 2021-05-24

## 2021-05-24 PROCEDURE — 99386 PREV VISIT NEW AGE 40-64: CPT | Performed by: PSYCHOLOGIST

## 2021-05-24 RX ORDER — FUROSEMIDE 40 MG/1
40 TABLET ORAL DAILY
COMMUNITY
Start: 2021-05-04 | End: 2021-06-04 | Stop reason: SDUPTHER

## 2021-05-24 RX ORDER — DIAZEPAM 5 MG/1
5 TABLET ORAL 2 TIMES DAILY PRN
Qty: 15 TABLET | Refills: 0 | Status: SHIPPED | OUTPATIENT
Start: 2021-05-24 | End: 2021-06-04 | Stop reason: SDUPTHER

## 2021-05-24 RX ORDER — ROSUVASTATIN CALCIUM 5 MG/1
5 TABLET, COATED ORAL DAILY
COMMUNITY
Start: 2021-05-04 | End: 2021-06-04 | Stop reason: SDUPTHER

## 2021-05-24 RX ORDER — OMEPRAZOLE 40 MG/1
1 CAPSULE, DELAYED RELEASE ORAL DAILY
COMMUNITY
Start: 2021-05-04 | End: 2021-05-24

## 2021-05-24 RX ORDER — CYCLOBENZAPRINE HCL 10 MG
1 TABLET ORAL DAILY PRN
COMMUNITY
Start: 2021-05-06 | End: 2021-05-24

## 2021-05-24 NOTE — PROGRESS NOTES
Chief Complaint  Establish Care (hypertension, hyperlipidemia, diabetes)    Subjective          Ernestina Wisdom presents to [facility] to establish care as her PCP today.  Hypertension  This is a chronic problem. The current episode started more than 1 year ago. The problem has been waxing and waning since onset. The problem is uncontrolled. Pertinent negatives include no blurred vision, chest pain, headaches, palpitations or shortness of breath. Risk factors for coronary artery disease include obesity, diabetes mellitus and dyslipidemia. Past treatments include lifestyle changes, beta blockers, diuretics and angiotensin blockers. Current antihypertension treatment includes lifestyle changes, diuretics, beta blockers and angiotensin blockers. The current treatment provides mild improvement. There are no compliance problems.  There is no history of angina, kidney disease or CAD/MI. There is no history of chronic renal disease.   Hyperlipidemia  This is a chronic problem. The current episode started more than 1 year ago. She has no history of chronic renal disease. Pertinent negatives include no chest pain or shortness of breath. Current antihyperlipidemic treatment includes statins. There are no compliance problems.  Risk factors for coronary artery disease include obesity, diabetes mellitus and hypertension.   Diabetes  She presents for her initial diabetic visit. She has type 2 diabetes mellitus. No MedicAlert identification noted. There are no hypoglycemic associated symptoms. Pertinent negatives for hypoglycemia include no headaches. Pertinent negatives for diabetes include no blurred vision, no chest pain and no visual change. There are no hypoglycemic complications. There are no diabetic complications. Risk factors for coronary artery disease include obesity, hypertension and dyslipidemia. Current diabetic treatment includes oral agent (monotherapy) and diet. She is compliant with  "treatment all of the time. She is following a generally healthy diet. When asked about meal planning, she reported none. She has not had a previous visit with a dietitian. An ACE inhibitor/angiotensin II receptor blocker is being taken. She does not see a podiatrist.Eye exam is current.       Objective   Vital Signs:   /76 (BP Location: Right arm, Patient Position: Sitting, Cuff Size: Adult)   Pulse 77   Temp 97.1 °F (36.2 °C) (Temporal)   Resp 20   Ht 160 cm (63\")   Wt 76.5 kg (168 lb 9.6 oz)   SpO2 99%   BMI 29.87 kg/m²     Physical Exam  Vitals and nursing note reviewed.   Constitutional:       General: She is not in acute distress.     Appearance: Normal appearance. She is well-developed. She is not ill-appearing or diaphoretic.   HENT:      Head: Normocephalic and atraumatic.      Right Ear: Tympanic membrane and external ear normal. There is no impacted cerumen.      Left Ear: Tympanic membrane and external ear normal. There is no impacted cerumen.      Nose: Nose normal. No congestion or rhinorrhea.      Mouth/Throat:      Mouth: Mucous membranes are moist.      Pharynx: No oropharyngeal exudate or posterior oropharyngeal erythema.   Eyes:      General: Vision grossly intact. No scleral icterus.        Right eye: No discharge.         Left eye: No discharge.      Extraocular Movements: Extraocular movements intact.      Conjunctiva/sclera: Conjunctivae normal.      Pupils: Pupils are equal, round, and reactive to light.   Neck:      Trachea: Trachea and phonation normal.   Cardiovascular:      Rate and Rhythm: Normal rate and regular rhythm.      Pulses: Normal pulses.      Heart sounds: Normal heart sounds. No murmur heard.     Pulmonary:      Effort: Pulmonary effort is normal. No accessory muscle usage or respiratory distress.      Breath sounds: Normal breath sounds. No stridor. No wheezing, rhonchi or rales.   Chest:      Chest wall: No tenderness or crepitus.      Breasts:         Right: " Normal.         Left: Normal.   Abdominal:      General: Abdomen is protuberant. Bowel sounds are normal. There is no distension.      Palpations: Abdomen is soft.      Tenderness: There is no abdominal tenderness. There is no right CVA tenderness, left CVA tenderness or rebound.      Hernia: There is no hernia in the left inguinal area or right inguinal area.   Genitourinary:     General: Normal vulva.      Pubic Area: No rash or pubic lice.       Labia:         Right: No rash, tenderness or lesion.         Left: No rash, tenderness or lesion.       Vagina: No vaginal discharge.      Rectum: Normal.   Musculoskeletal:         General: No swelling or tenderness. Normal range of motion.      Cervical back: Normal range of motion and neck supple. No rigidity.      Right lower leg: No edema.      Left lower leg: No edema.   Lymphadenopathy:      Cervical: No cervical adenopathy.   Skin:     General: Skin is warm.      Capillary Refill: Capillary refill takes less than 2 seconds.      Coloration: Skin is not pale.      Findings: No erythema, lesion or rash. Rash is not crusting, macular, nodular or papular.      Nails: There is no clubbing.   Neurological:      General: No focal deficit present.      Mental Status: She is alert and oriented to person, place, and time. Mental status is at baseline.      Cranial Nerves: Cranial nerves are intact.      Sensory: Sensation is intact.      Motor: Motor function is intact. No weakness.      Gait: Gait is intact. Gait normal.      Deep Tendon Reflexes: Reflexes are normal and symmetric.   Psychiatric:         Attention and Perception: Attention and perception normal.         Mood and Affect: Mood normal.         Speech: Speech normal.         Behavior: Behavior normal. Behavior is cooperative.         Thought Content: Thought content normal.         Cognition and Memory: Cognition and memory normal.         Judgment: Judgment normal.        Result Review :   The following data  was reviewed by: Edgar Lemus MD on 05/24/2021:  No ambulatory labs reviewed but ordered today instead.  An x-ray and EKG also ordered today.       Diabetic foot exam:   Left: Filament test present   Pulses Dorsalis Pedis:  present  Posterior Tibial:  present  PRESENT   Reflexes 4+    Vibratory sensation normal   Proprioception normal   Sharp/dull discrimination normal       Right: Filament test present   Pulses Dorsalis Pedis:  present  Posterior Tibial:  present   Reflexes 4+    Vibratory sensation normal   Proprioception normal   Sharp/dull discrimination normal    Assessment and Plan    Diagnoses and all orders for this visit:    1. Encounter for medical examination to establish care (Primary)  -     TSH; Future  -     Hepatitis C Antibody; Future  -     Vitamin B12; Future  -     Cancel: POC Urinalysis Dipstick; Future  -     POC Urinalysis Dipstick; Future    2. Benign hypertension  -     ECG 12 Lead; Future  -     XR Chest PA & Lateral    3. Mixed hyperlipidemia  -     Comprehensive Metabolic Panel; Future  -     Lipid Panel; Future    4. Diabetes mellitus without complication (CMS/HCC)  Assessment & Plan:  Diabetes is improving with treatment.   Continue current treatment regimen.  Reminded to bring in blood sugar diary at next visit.  Dietary recommendations for ADA diet.  Regular aerobic exercise.  Discussed sick day management.  Discussed foot care.  Diabetes will be reassessed in 3 months.    Orders:  -     Ambulatory Referral for Diabetic Eye Exam-Ophthalmology  -     CBC & Differential; Future  -     Comprehensive Metabolic Panel; Future  -     Hemoglobin A1c; Future  -     MicroAlbumin, Urine, Random - Urine, Clean Catch  -     Cancel: POC Urinalysis Dipstick; Future  -     POC Urinalysis Dipstick; Future    5. Vitamin D deficiency  -     Vitamin D 25 Hydroxy; Future    6. Encounter for screening mammogram for breast cancer  -     Mammo Screening Bilateral With CAD; Future    7. Encounter for  screening for osteoporosis  -     DEXA Bone Density Axial; Future    8. Screening for colon cancer  -     Cologuard - Stool, Per Rectum; Future    9. Grief reaction  -     Discontinue: diazePAM (Valium) 5 MG tablet; Take 1 tablet by mouth 2 (Two) Times a Day As Needed for Anxiety for up to 15 days.  Dispense: 15 tablet; Refill: 0    I spent 30 minutes caring for Ernestina on this date of service. This time includes time spent by me in the following activities:reviewing tests, performing a medically appropriate examination and/or evaluation , counseling and educating the patient/family/caregiver, ordering medications, tests, or procedures and documenting information in the medical record  Follow Up      The preventive exam has been reviewed in detail.  The patient has been fully counseled on preventative guidelines for vaccines, cancer screenings, and other health maintenance needs.   The patient has been counseled on guidelines for maintaining a lifestyle to promote good health and to minimize chronic diseases.  The patient has been assisted with scheduling these healthcare procedures for the coming year and given a written document of health maintenance and anticipatory guidance for age with the AVS.     Return in about 2 weeks (around 6/7/2021) for RTC FASTING LABS, Recheck ON 6/14/21 FOR MEDS/ REFILL.  Patient was given instructions and counseling regarding her condition or for health maintenance advice. Please see specific information pulled into the AVS if appropriate.         This document has been electronically signed by Edgar Lemus MD  June 11, 2021 11:50 EDT

## 2021-05-27 NOTE — ASSESSMENT & PLAN NOTE
Diabetes is improving with treatment.   Continue current treatment regimen.  Reminded to bring in blood sugar diary at next visit.  Dietary recommendations for ADA diet.  Regular aerobic exercise.  Discussed sick day management.  Discussed foot care.  Diabetes will be reassessed in 3 months.

## 2021-06-04 ENCOUNTER — OFFICE VISIT (OUTPATIENT)
Dept: FAMILY MEDICINE CLINIC | Facility: CLINIC | Age: 61
End: 2021-06-04

## 2021-06-04 VITALS
DIASTOLIC BLOOD PRESSURE: 91 MMHG | SYSTOLIC BLOOD PRESSURE: 156 MMHG | BODY MASS INDEX: 29.55 KG/M2 | HEART RATE: 68 BPM | OXYGEN SATURATION: 98 % | WEIGHT: 166.8 LBS | TEMPERATURE: 98.6 F | HEIGHT: 63 IN

## 2021-06-04 DIAGNOSIS — Z76.0 ENCOUNTER FOR MEDICATION REFILL: ICD-10-CM

## 2021-06-04 DIAGNOSIS — E03.8 OTHER SPECIFIED HYPOTHYROIDISM: ICD-10-CM

## 2021-06-04 DIAGNOSIS — R00.2 PALPITATIONS: ICD-10-CM

## 2021-06-04 DIAGNOSIS — G25.81 RESTLESS LEG SYNDROME: ICD-10-CM

## 2021-06-04 DIAGNOSIS — R07.2 PRECORDIAL PAIN: ICD-10-CM

## 2021-06-04 DIAGNOSIS — F43.21 GRIEF REACTION: ICD-10-CM

## 2021-06-04 DIAGNOSIS — E11.9 DIABETES MELLITUS WITHOUT COMPLICATION (HCC): ICD-10-CM

## 2021-06-04 DIAGNOSIS — K21.9 GASTROESOPHAGEAL REFLUX DISEASE WITHOUT ESOPHAGITIS: ICD-10-CM

## 2021-06-04 DIAGNOSIS — G57.93 NEUROPATHY INVOLVING BOTH LOWER EXTREMITIES: ICD-10-CM

## 2021-06-04 DIAGNOSIS — I10 BENIGN HYPERTENSION: Primary | ICD-10-CM

## 2021-06-04 PROCEDURE — 99214 OFFICE O/P EST MOD 30 MIN: CPT | Performed by: PSYCHOLOGIST

## 2021-06-04 RX ORDER — RANOLAZINE 500 MG/1
500 TABLET, EXTENDED RELEASE ORAL 2 TIMES DAILY
Qty: 60 TABLET | Refills: 11 | Status: SHIPPED | OUTPATIENT
Start: 2021-06-04 | End: 2022-05-27 | Stop reason: SDUPTHER

## 2021-06-04 RX ORDER — LOSARTAN POTASSIUM AND HYDROCHLOROTHIAZIDE 25; 100 MG/1; MG/1
1 TABLET ORAL DAILY
Qty: 30 TABLET | Refills: 0 | Status: SHIPPED | OUTPATIENT
Start: 2021-06-04 | End: 2021-07-02

## 2021-06-04 RX ORDER — ROSUVASTATIN CALCIUM 5 MG/1
5 TABLET, COATED ORAL DAILY
Qty: 30 TABLET | Refills: 2 | Status: SHIPPED | OUTPATIENT
Start: 2021-06-04 | End: 2021-09-10 | Stop reason: SDUPTHER

## 2021-06-04 RX ORDER — GABAPENTIN 300 MG/1
300 CAPSULE ORAL NIGHTLY
Qty: 30 CAPSULE | Refills: 0 | Status: SHIPPED | OUTPATIENT
Start: 2021-06-04 | End: 2021-06-08 | Stop reason: SDUPTHER

## 2021-06-04 RX ORDER — DIAZEPAM 10 MG/1
10 TABLET ORAL NIGHTLY PRN
Qty: 15 TABLET | Refills: 0 | Status: SHIPPED | OUTPATIENT
Start: 2021-06-04 | End: 2021-06-19

## 2021-06-04 RX ORDER — ASPIRIN 81 MG/1
81 TABLET ORAL
Qty: 30 TABLET | Refills: 2 | Status: SHIPPED | OUTPATIENT
Start: 2021-06-04 | End: 2021-09-02

## 2021-06-04 RX ORDER — METOPROLOL SUCCINATE 25 MG/1
25 TABLET, EXTENDED RELEASE ORAL DAILY
Qty: 30 TABLET | Refills: 2 | Status: SHIPPED | OUTPATIENT
Start: 2021-06-04 | End: 2022-01-11 | Stop reason: SDUPTHER

## 2021-06-04 RX ORDER — AMLODIPINE BESYLATE 10 MG/1
10 TABLET ORAL DAILY
Qty: 30 TABLET | Refills: 2 | Status: SHIPPED | OUTPATIENT
Start: 2021-06-04 | End: 2021-09-02

## 2021-06-04 RX ORDER — FUROSEMIDE 20 MG/1
20 TABLET ORAL DAILY
Qty: 30 TABLET | Refills: 0 | Status: SHIPPED | OUTPATIENT
Start: 2021-06-04 | End: 2021-07-01

## 2021-06-04 RX ORDER — OMEPRAZOLE 20 MG/1
20 CAPSULE, DELAYED RELEASE ORAL DAILY
Qty: 30 CAPSULE | Refills: 2 | Status: SHIPPED | OUTPATIENT
Start: 2021-06-04 | End: 2021-09-02

## 2021-06-07 ENCOUNTER — LAB (OUTPATIENT)
Dept: FAMILY MEDICINE CLINIC | Facility: CLINIC | Age: 61
End: 2021-06-07

## 2021-06-07 DIAGNOSIS — E78.2 MIXED HYPERLIPIDEMIA: ICD-10-CM

## 2021-06-07 DIAGNOSIS — E55.9 VITAMIN D DEFICIENCY: ICD-10-CM

## 2021-06-07 DIAGNOSIS — Z00.00 ENCOUNTER FOR MEDICAL EXAMINATION TO ESTABLISH CARE: ICD-10-CM

## 2021-06-07 DIAGNOSIS — E11.9 DIABETES MELLITUS WITHOUT COMPLICATION (HCC): ICD-10-CM

## 2021-06-07 LAB
ALBUMIN SERPL-MCNC: 4.29 G/DL (ref 3.5–5.2)
ALBUMIN/GLOB SERPL: 1.3 G/DL
ALP SERPL-CCNC: 126 U/L (ref 39–117)
ALT SERPL W P-5'-P-CCNC: 11 U/L (ref 1–33)
ANION GAP SERPL CALCULATED.3IONS-SCNC: 9.6 MMOL/L (ref 5–15)
AST SERPL-CCNC: 15 U/L (ref 1–32)
BASOPHILS # BLD AUTO: 0.08 10*3/MM3 (ref 0–0.2)
BASOPHILS NFR BLD AUTO: 1.5 % (ref 0–1.5)
BILIRUB BLD-MCNC: NEGATIVE MG/DL
BILIRUB SERPL-MCNC: 0.3 MG/DL (ref 0–1.2)
BUN SERPL-MCNC: 16 MG/DL (ref 8–23)
BUN/CREAT SERPL: 18.2 (ref 7–25)
CALCIUM SPEC-SCNC: 9.3 MG/DL (ref 8.6–10.5)
CHLORIDE SERPL-SCNC: 98 MMOL/L (ref 98–107)
CHOLEST SERPL-MCNC: 176 MG/DL (ref 0–200)
CLARITY, POC: CLEAR
CO2 SERPL-SCNC: 28.4 MMOL/L (ref 22–29)
COLOR UR: YELLOW
CREAT SERPL-MCNC: 0.88 MG/DL (ref 0.57–1)
DEPRECATED RDW RBC AUTO: 41.5 FL (ref 37–54)
EOSINOPHIL # BLD AUTO: 0.1 10*3/MM3 (ref 0–0.4)
EOSINOPHIL NFR BLD AUTO: 1.9 % (ref 0.3–6.2)
ERYTHROCYTE [DISTWIDTH] IN BLOOD BY AUTOMATED COUNT: 13.2 % (ref 12.3–15.4)
GFR SERPL CREATININE-BSD FRML MDRD: 65 ML/MIN/1.73
GLOBULIN UR ELPH-MCNC: 3.3 GM/DL
GLUCOSE SERPL-MCNC: 307 MG/DL (ref 65–99)
GLUCOSE UR STRIP-MCNC: ABNORMAL MG/DL
HBA1C MFR BLD: 9.4 % (ref 4.8–5.6)
HCT VFR BLD AUTO: 39.4 % (ref 34–46.6)
HCV AB SER DONR QL: NORMAL
HDLC SERPL-MCNC: 49 MG/DL (ref 40–60)
HGB BLD-MCNC: 12.7 G/DL (ref 12–15.9)
IMM GRANULOCYTES # BLD AUTO: 0.01 10*3/MM3 (ref 0–0.05)
IMM GRANULOCYTES NFR BLD AUTO: 0.2 % (ref 0–0.5)
KETONES UR QL: NEGATIVE
LDLC SERPL CALC-MCNC: 101 MG/DL (ref 0–100)
LDLC/HDLC SERPL: 2 {RATIO}
LEUKOCYTE EST, POC: NEGATIVE
LYMPHOCYTES # BLD AUTO: 1.94 10*3/MM3 (ref 0.7–3.1)
LYMPHOCYTES NFR BLD AUTO: 37.2 % (ref 19.6–45.3)
MCH RBC QN AUTO: 27.7 PG (ref 26.6–33)
MCHC RBC AUTO-ENTMCNC: 32.2 G/DL (ref 31.5–35.7)
MCV RBC AUTO: 85.8 FL (ref 79–97)
MONOCYTES # BLD AUTO: 0.38 10*3/MM3 (ref 0.1–0.9)
MONOCYTES NFR BLD AUTO: 7.3 % (ref 5–12)
NEUTROPHILS NFR BLD AUTO: 2.71 10*3/MM3 (ref 1.7–7)
NEUTROPHILS NFR BLD AUTO: 51.9 % (ref 42.7–76)
NITRITE UR-MCNC: NEGATIVE MG/ML
NRBC BLD AUTO-RTO: 0 /100 WBC (ref 0–0.2)
PH UR: 7 [PH] (ref 5–8)
PLATELET # BLD AUTO: 198 10*3/MM3 (ref 140–450)
PMV BLD AUTO: 9.9 FL (ref 6–12)
POTASSIUM SERPL-SCNC: 4.1 MMOL/L (ref 3.5–5.2)
PROT SERPL-MCNC: 7.6 G/DL (ref 6–8.5)
PROT UR STRIP-MCNC: NEGATIVE MG/DL
RBC # BLD AUTO: 4.59 10*6/MM3 (ref 3.77–5.28)
RBC # UR STRIP: NEGATIVE /UL
SODIUM SERPL-SCNC: 136 MMOL/L (ref 136–145)
SP GR UR: 1.02 (ref 1–1.03)
TRIGL SERPL-MCNC: 146 MG/DL (ref 0–150)
TSH SERPL DL<=0.05 MIU/L-ACNC: 5.18 UIU/ML (ref 0.27–4.2)
UROBILINOGEN UR QL: NORMAL
VLDLC SERPL-MCNC: 26 MG/DL (ref 5–40)
WBC # BLD AUTO: 5.22 10*3/MM3 (ref 3.4–10.8)

## 2021-06-07 PROCEDURE — 83036 HEMOGLOBIN GLYCOSYLATED A1C: CPT | Performed by: PSYCHOLOGIST

## 2021-06-07 PROCEDURE — 82306 VITAMIN D 25 HYDROXY: CPT | Performed by: PSYCHOLOGIST

## 2021-06-07 PROCEDURE — 82043 UR ALBUMIN QUANTITATIVE: CPT | Performed by: PSYCHOLOGIST

## 2021-06-07 PROCEDURE — 81002 URINALYSIS NONAUTO W/O SCOPE: CPT | Performed by: PSYCHOLOGIST

## 2021-06-07 PROCEDURE — 80061 LIPID PANEL: CPT | Performed by: PSYCHOLOGIST

## 2021-06-07 PROCEDURE — 82607 VITAMIN B-12: CPT | Performed by: PSYCHOLOGIST

## 2021-06-07 PROCEDURE — 80050 GENERAL HEALTH PANEL: CPT | Performed by: PSYCHOLOGIST

## 2021-06-07 PROCEDURE — 86803 HEPATITIS C AB TEST: CPT | Performed by: PSYCHOLOGIST

## 2021-06-08 DIAGNOSIS — G25.81 RESTLESS LEG SYNDROME: ICD-10-CM

## 2021-06-08 PROBLEM — E03.8 OTHER SPECIFIED HYPOTHYROIDISM: Status: ACTIVE | Noted: 2021-06-08

## 2021-06-08 LAB
25(OH)D3 SERPL-MCNC: 32.5 NG/ML (ref 30–100)
ALBUMIN UR-MCNC: <1.2 MG/DL
VIT B12 BLD-MCNC: 484 PG/ML (ref 211–946)

## 2021-06-08 RX ORDER — GABAPENTIN 300 MG/1
300 CAPSULE ORAL NIGHTLY
Qty: 30 CAPSULE | Refills: 0 | Status: SHIPPED | OUTPATIENT
Start: 2021-06-08 | End: 2021-07-01

## 2021-06-08 RX ORDER — LEVOTHYROXINE SODIUM 0.05 MG/1
50 TABLET ORAL DAILY
Qty: 30 TABLET | Refills: 2 | Status: SHIPPED | OUTPATIENT
Start: 2021-06-08 | End: 2021-09-10 | Stop reason: SDUPTHER

## 2021-06-08 NOTE — ASSESSMENT & PLAN NOTE
Patient recently diagnosed with this medical condition and will be getting started with medications today.  We will monitor again in 3 months time to check levels.

## 2021-06-10 NOTE — PROGRESS NOTES
"Chief Complaint  Medication Problem (patient states that she is still unable to sleep/ medication seems to not be working.) and Insomnia (Pt spouse recently passed away, pt having trouble sleeping)    Subjective           Ernestina Wisdom presents to [facility] for an acute care visit as her PCP.  She reports falling Monday and sustained injuries to the right side of her body, she was really dizzy and fell.  Her  passed away and buried him this past Saturday, he was in the hospital  in [facility] for 4-6 weeks. She and her  had Covid, and her  had Covid but had co morbidities.   Insomnia  This is a new problem. The current episode started 1 to 4 weeks ago. The problem occurs constantly. The problem has been gradually worsening. Pertinent negatives include no chest pain, congestion, coughing, fever, headaches, nausea or visual change. The symptoms are aggravated by stress. She has tried rest for the symptoms. The treatment provided mild relief.   Hypertension  Associated symptoms include anxiety. Pertinent negatives include no chest pain or headaches.   Hyperlipidemia  Pertinent negatives include no chest pain.   Anxiety  Symptoms include insomnia. Patient reports no chest pain or nausea.       Heartburn  She reports no chest pain, no coughing or no nausea.   She cannot sleep and get any rest. She is still in grief with her 's loss.     Objective   Vital Signs:   /91 (BP Location: Left arm, Patient Position: Sitting, Cuff Size: Adult)   Pulse 68   Temp 98.6 °F (37 °C) (Temporal)   Ht 160 cm (63\")   Wt 75.7 kg (166 lb 12.8 oz)   SpO2 98%   BMI 29.55 kg/m²     Physical Exam  Vitals and nursing note reviewed.   Constitutional:       General: She is awake.      Appearance: Normal appearance. She is well-developed, well-groomed and normal weight.   HENT:      Head: Normocephalic and atraumatic.      Jaw: There is normal jaw occlusion.      Nose: Nose " normal.      Mouth/Throat:      Mouth: Mucous membranes are moist.      Pharynx: Oropharynx is clear.   Eyes:      General: Lids are normal. Vision grossly intact. Gaze aligned appropriately.      Extraocular Movements: Extraocular movements intact.      Conjunctiva/sclera: Conjunctivae normal.      Pupils: Pupils are equal, round, and reactive to light.   Neck:      Trachea: Trachea and phonation normal.   Cardiovascular:      Rate and Rhythm: Normal rate and regular rhythm.      Pulses: Normal pulses.      Heart sounds: Normal heart sounds.   Pulmonary:      Effort: Pulmonary effort is normal.      Breath sounds: Normal breath sounds.   Abdominal:      General: Abdomen is protuberant. Bowel sounds are normal.      Palpations: Abdomen is soft.   Genitourinary:     Rectum: Normal.   Musculoskeletal:         General: Normal range of motion.      Cervical back: Full passive range of motion without pain, normal range of motion and neck supple.   Lymphadenopathy:      Cervical: No cervical adenopathy.   Skin:     General: Skin is warm.      Capillary Refill: Capillary refill takes less than 2 seconds.   Neurological:      General: No focal deficit present.      Mental Status: She is alert and oriented to person, place, and time.      Cranial Nerves: Cranial nerves are intact.      Sensory: Sensation is intact.      Motor: Motor function is intact.      Coordination: Coordination is intact.      Gait: Gait is intact.      Deep Tendon Reflexes: Reflexes are normal and symmetric.   Psychiatric:         Attention and Perception: Attention and perception normal.         Mood and Affect: Mood and affect normal.         Speech: Speech normal.         Behavior: Behavior normal.         Thought Content: Thought content normal.         Cognition and Memory: Cognition and memory normal.         Judgment: Judgment normal.        Result Review :   The following data was reviewed by: Edgar Lemus MD on 06/04/2021  Ambulatory  Labs reviewed 3/4/21         Assessment and Plan    Diagnoses and all orders for this visit:    1. Benign hypertension (Primary)    2. Grief reaction  -     diazePAM (Valium) 10 MG tablet; Take 1 tablet by mouth At Night As Needed for Anxiety for up to 15 days.  Dispense: 15 tablet; Refill: 0    3. Neuropathy involving both lower extremities    4. Restless leg syndrome  -     Discontinue: gabapentin (NEURONTIN) 300 MG capsule; Take 1 capsule by mouth Every Night for 30 days.  Dispense: 30 capsule; Refill: 0    5. Diabetes mellitus without complication (CMS/[facility])    6. Gastroesophageal reflux disease without esophagitis    7. Palpitations  -     metoprolol succinate XL (TOPROL-XL) 25 MG 24 hr tablet; Take 1 tablet by mouth Daily for 90 days.  Dispense: 30 tablet; Refill: 2    8. Precordial pain  -     ranolazine (Ranexa) 500 MG 12 hr tablet; Take 1 tablet by mouth 2 (Two) Times a Day.  Dispense: 60 tablet; Refill: 11    9. Encounter for medication refill    10. Other specified hypothyroidism  Assessment & Plan:  Patient recently diagnosed with this medical condition and will be getting started with medications today.  We will monitor again in 3 months time to check levels.      Other orders  -     omeprazole (priLOSEC) 20 MG capsule; Take 1 capsule by mouth Daily for 90 days.  Dispense: 30 capsule; Refill: 2  -     amLODIPine (NORVASC) 10 MG tablet; Take 1 tablet by mouth Daily for 90 days.  Dispense: 30 tablet; Refill: 2  -     aspirin (aspirin) 81 MG EC tablet; Take 1 tablet by mouth Daily With Dinner for 90 days.  Dispense: 30 tablet; Refill: 2  -     furosemide (Lasix) 20 MG tablet; Take 1 tablet by mouth Daily for 30 days.  Dispense: 30 tablet; Refill: 0  -     losartan-hydrochlorothiazide (HYZAAR) 100-25 MG per tablet; Take 1 tablet by mouth Daily for 30 days.  Dispense: 30 tablet; Refill: 0  -     rosuvastatin (Crestor) 5 MG tablet; Take 1 tablet by mouth Daily for 90 days.  Dispense: 30 tablet; Refill: 2  -      sertraline (ZOLOFT) 50 MG tablet; Take 1 tablet by mouth Daily for 90 days.  Dispense: 30 tablet; Refill: 2    I spent 20 minutes caring for Ernestina on this date of service. This time includes time spent by me in the following activities:reviewing tests, performing a medically appropriate examination and/or evaluation , counseling and educating the patient/family/caregiver, ordering medications, tests, or procedures and documenting information in the medical record   Follow Up   Return in about 1 month (around 7/4/2021) for Recheck/ med changes .  Patient was given instructions and counseling regarding her condition or for health maintenance advice. Please see specific information pulled into the AVS if appropriate.       This document has been electronically signed by Edgar Lemus MD  June 8, 2021 09:50 EDT     Yes

## 2021-06-17 ENCOUNTER — TELEPHONE (OUTPATIENT)
Dept: FAMILY MEDICINE CLINIC | Facility: CLINIC | Age: 61
End: 2021-06-17

## 2021-06-17 NOTE — TELEPHONE ENCOUNTER
Caller: Ernestina Wisdom    Relationship: Self    Best call back number: [phone]    What is the best time to reach you: ANYTIME    Who are you requesting to speak with (clinical staff, provider,  specific staff member): DR. CHAPMAN    Do you know the name of the person who called: PATIENT    What was the call regarding: levothyroxine (Synthroid) 50 MCG tablet.  PATIENT STATED AFTER SHE STARTED TAKING THE MEDICATION AS DIRECTED.  HOWEVER, SHE SAID SINCE THEN SHE HAS BEEN VERY FATIGUED WITH NO ENERGY AND SHE ALSO HAS SOME BLISTERS IN HER MOUTH ALL OVER TONGUE.  SHE DOESN'T KNOW IF THIS IS RELATED OR NOT.  SHE SAID SHE HASN'T TAKEN ANY IN THE LAST 2 DAYS AND HAS MORE ENERGY THAN SHE HAD.  THE BLISTERS HAVE NOT RESOLVED.    Do you require a callback: YES

## 2021-07-01 ENCOUNTER — OFFICE VISIT (OUTPATIENT)
Dept: FAMILY MEDICINE CLINIC | Facility: CLINIC | Age: 61
End: 2021-07-01

## 2021-07-01 VITALS
OXYGEN SATURATION: 99 % | BODY MASS INDEX: 29.41 KG/M2 | HEART RATE: 76 BPM | TEMPERATURE: 97.1 F | DIASTOLIC BLOOD PRESSURE: 81 MMHG | SYSTOLIC BLOOD PRESSURE: 129 MMHG | WEIGHT: 166 LBS | HEIGHT: 63 IN

## 2021-07-01 DIAGNOSIS — E03.8 OTHER SPECIFIED HYPOTHYROIDISM: ICD-10-CM

## 2021-07-01 DIAGNOSIS — E11.9 DIABETES MELLITUS WITHOUT COMPLICATION (HCC): ICD-10-CM

## 2021-07-01 DIAGNOSIS — I10 BENIGN HYPERTENSION: Primary | ICD-10-CM

## 2021-07-01 DIAGNOSIS — Z76.0 ENCOUNTER FOR MEDICATION REFILL: ICD-10-CM

## 2021-07-01 DIAGNOSIS — G57.93 NEUROPATHY INVOLVING BOTH LOWER EXTREMITIES: ICD-10-CM

## 2021-07-01 PROCEDURE — 99213 OFFICE O/P EST LOW 20 MIN: CPT | Performed by: PSYCHOLOGIST

## 2021-07-01 RX ORDER — CYCLOBENZAPRINE HCL 10 MG
TABLET ORAL
COMMUNITY
Start: 2021-06-05 | End: 2021-08-10 | Stop reason: SDUPTHER

## 2021-07-01 RX ORDER — DULAGLUTIDE 0.75 MG/.5ML
INJECTION, SOLUTION SUBCUTANEOUS
COMMUNITY
Start: 2021-06-01 | End: 2021-07-01 | Stop reason: SDUPTHER

## 2021-07-01 NOTE — PROGRESS NOTES
Chief Complaint  Follow-up (restless leg syndrome, thyroid, back pain), Urinary Urgency (pt states that she has had this in the past due to back issues), Gait Problem (due to back pain), and Medication Problem (Synthroid, pt states it makes her feel sick to her stomach)    Subjective          Ernestina Wisdom presents to [facility] FOR FOLLOW UP ON  CHRONIC ILLNESSES:  Hypothyroidism  This is a new problem. The current episode started more than 1 month ago. The problem has been waxing and waning. Associated symptoms include fatigue. Pertinent negatives include no fever, headaches, nausea or vomiting. The symptoms are aggravated by stress.   Back Pain  This is a chronic problem. The current episode started more than 1 year ago. The problem occurs intermittently. The problem has been waxing and waning since onset. The pain is present in the lumbar spine. The quality of the pain is described as aching. The pain radiates to the right thigh. The pain is at a severity of 7/10. The pain is moderate. The pain is the same all the time. The symptoms are aggravated by sitting, standing and bending. Stiffness is present all day. Pertinent negatives include no fever or headaches. Risk factors include recent trauma. She has tried NSAIDs for the symptoms. The treatment provided mild relief.   Diabetes  She presents for her follow-up diabetic visit. She has type 2 diabetes mellitus. No MedicAlert identification noted. The initial diagnosis of diabetes was made 20 years ago. Her disease course has been fluctuating. Pertinent negatives for hypoglycemia include no headaches. Associated symptoms include fatigue. There are no hypoglycemic complications. There are no diabetic complications. Current diabetic treatment includes diet and oral agent (monotherapy). She is compliant with treatment all of the time. She is following a generally healthy diet. When asked about meal planning, she reported none. She has  Morphine gtt increased to 3ml/hr. Gave one dose of ativan for restlessness. Pt resp between 20 and 16. Bm overnight. Incontinent. Turning patient. [place] bed.      Problem: COPING  Goal: Pt/Family able to verbalize concerns and demonstrate effective c "not had a previous visit with a dietitian. She participates in exercise intermittently. Her lunch blood glucose is taken between 12-1 pm. Her lunch blood glucose range is generally 140-180 mg/dl. An ACE inhibitor/angiotensin II receptor blocker is being taken. She does not see a podiatrist.Eye exam is current.   S/P FALL 5/21/2021    Objective   Vital Signs:   /81 (BP Location: Right arm, Patient Position: Sitting, Cuff Size: Adult)   Pulse 76   Temp 97.1 °F (36.2 °C) (Temporal)   Ht 160 cm (63\")   Wt 75.3 kg (166 lb)   SpO2 99%   BMI 29.41 kg/m²     Physical Exam  Vitals and nursing note reviewed.   Constitutional:       General: She is awake.      Appearance: Normal appearance. She is well-developed, well-groomed and normal weight.   HENT:      Head: Normocephalic and atraumatic.      Jaw: There is normal jaw occlusion.      Nose: Nose normal.      Mouth/Throat:      Mouth: Mucous membranes are moist.      Pharynx: Oropharynx is clear.   Eyes:      General: Lids are normal. Vision grossly intact. Gaze aligned appropriately.      Extraocular Movements: Extraocular movements intact.      Conjunctiva/sclera: Conjunctivae normal.      Pupils: Pupils are equal, round, and reactive to light.   Neck:      Trachea: Trachea and phonation normal.   Cardiovascular:      Rate and Rhythm: Normal rate and regular rhythm.      Pulses: Normal pulses.      Heart sounds: Normal heart sounds.   Pulmonary:      Effort: Pulmonary effort is normal.      Breath sounds: Normal breath sounds.   Abdominal:      General: Abdomen is protuberant. Bowel sounds are normal.      Palpations: Abdomen is soft.   Genitourinary:     Rectum: Normal.   Musculoskeletal:         General: Normal range of motion.      Cervical back: Full passive range of motion without pain, normal range of motion and neck supple.   Lymphadenopathy:      Cervical: No cervical adenopathy.   Skin:     General: Skin is warm and dry.      Capillary Refill: " patient/family right to receive or not to receive information  - Serve as a liaison between patient and family and health care team  - Initiate Consults from Ethics, [address] or initiate [facility] as is appropriate  Outcome: Progressing Capillary refill takes less than 2 seconds.   Neurological:      General: No focal deficit present.      Mental Status: She is alert and oriented to person, place, and time.      Cranial Nerves: Cranial nerves are intact.      Sensory: Sensation is intact.      Motor: Motor function is intact.      Coordination: Coordination is intact.      Gait: Gait is intact.      Deep Tendon Reflexes: Reflexes are normal and symmetric.   Psychiatric:         Attention and Perception: Attention and perception normal.         Mood and Affect: Mood and affect normal.         Speech: Speech normal.         Behavior: Behavior normal.         Thought Content: Thought content normal.         Cognition and Memory: Cognition and memory normal.         Judgment: Judgment normal.        Result Review :   The following data was reviewed by: Edgar Lemus MD on 07/01/2021:  Common labs    Common Labsle 6/7/21 6/7/21 6/7/21 6/7/21 6/7/21    0820 0820 0820 0820 0820   Glucose    307 (A)    BUN    16    Creatinine    0.88    eGFR Non African Am    65    Sodium    136    Potassium    4.1    Chloride    98    Calcium    9.3    Albumin    4.29    Total Bilirubin    0.3    Alkaline Phosphatase    126 (A)    AST (SGOT)    15    ALT (SGPT)    11    WBC 5.22       Hemoglobin 12.7       Hematocrit 39.4       Platelets 198       Total Cholesterol   176     Triglycerides   146     HDL Cholesterol   49     LDL Cholesterol    101 (A)     Hemoglobin A1C  9.40 (A)      Microalbumin, Urine     <1.2   (A) Abnormal value            Data reviewed: Radiologic studies 5/24/2021 CXR          Assessment and Plan    Diagnoses and all orders for this visit:    1. Benign hypertension (Primary)    2. Diabetes mellitus without complication (CMS/HCC)    3. Neuropathy involving both lower extremities    4. Encounter for medication refill      I spent 20 minutes caring for rEnestina on this date of service. This time includes time spent by me in the following  discussion that helps patient sort out spiritual concerns  - Help patient identify where meaning/hope/comfort & strength are in his/her life  - Refer patient to [facility] community for assistance, as appropriate  - Respond to patient/family need for prayer/rit activities:reviewing tests, performing a medically appropriate examination and/or evaluation , counseling and educating the patient/family/caregiver, ordering medications, tests, or procedures and documenting information in the medical record     Follow Up   Return in about 3 months (around 10/1/2021) for Recheck, RTC FASTING LABSN &  OV FORMED REFILLS OF (HTN/DM/BACK PAIN/THYROID).  Patient was given instructions and counseling regarding her condition or for health maintenance advice. Please see specific information pulled into the AVS if appropriate.         This document has been electronically signed by Edgar Lemus MD  July 1, 2021 14:14 EDT

## 2021-07-02 RX ORDER — LOSARTAN POTASSIUM AND HYDROCHLOROTHIAZIDE 25; 100 MG/1; MG/1
1 TABLET ORAL DAILY
Qty: 30 TABLET | Refills: 3 | Status: SHIPPED | OUTPATIENT
Start: 2021-07-02 | End: 2022-01-11 | Stop reason: SDUPTHER

## 2021-07-02 NOTE — TELEPHONE ENCOUNTER
The pharmacy faxed over a medication refill request for Losartan/ HCTZ 100/25 mg tab QTY: 30. Medication was last refilled on 6/4/21 w/ 0 refills. Patient was seen on 7/1/21, but refill was not sent in on this medication.  Patient's next appointment is not until 10/1/21. Please advise.

## 2021-07-15 DIAGNOSIS — Z13.820 ENCOUNTER FOR SCREENING FOR OSTEOPOROSIS: ICD-10-CM

## 2021-08-10 ENCOUNTER — TELEPHONE (OUTPATIENT)
Dept: FAMILY MEDICINE CLINIC | Facility: CLINIC | Age: 61
End: 2021-08-10

## 2021-08-10 RX ORDER — CYCLOBENZAPRINE HCL 10 MG
10 TABLET ORAL 3 TIMES DAILY PRN
Qty: 30 TABLET | Refills: 0 | Status: SHIPPED | OUTPATIENT
Start: 2021-08-10 | End: 2021-08-20

## 2021-08-10 NOTE — TELEPHONE ENCOUNTER
Patient came in today requesting medications to be refilled she is running out on her Flexeril and another one that she cannot member so she will call us to let us know that is that we can get a refill I will see her back here on September 30 for the additional refills on her medication and follow-up visit with me.

## 2021-09-10 RX ORDER — LEVOTHYROXINE SODIUM 0.05 MG/1
50 TABLET ORAL DAILY
Qty: 30 TABLET | Refills: 2 | Status: SHIPPED | OUTPATIENT
Start: 2021-09-10 | End: 2021-10-01 | Stop reason: SDUPTHER

## 2021-09-10 RX ORDER — ROSUVASTATIN CALCIUM 5 MG/1
5 TABLET, COATED ORAL DAILY
Qty: 30 TABLET | Refills: 2 | Status: SHIPPED | OUTPATIENT
Start: 2021-09-10 | End: 2021-11-09 | Stop reason: SDUPTHER

## 2021-09-10 NOTE — TELEPHONE ENCOUNTER
Caller: Ernestina Wisdom    Relationship: Self    Best call back number: [phone]    Medication needed:   Requested Prescriptions     Pending Prescriptions Disp Refills   • levothyroxine (Synthroid) 50 MCG tablet 30 tablet 2     Sig: Take 1 tablet by mouth Daily for 90 days. TAKE IN THE MORNING WHEN YOU WAKE UP ON AN EMPTY STOMACH   • metFORMIN (GLUCOPHAGE) 500 MG tablet 60 tablet 2     Sig: Take 1 tablet by mouth 4 (Four) Times a Day With Meals & at Bedtime for 45 days. TAKE WITH MEALS BREAKFAST AND DINNER   • rosuvastatin (Crestor) 5 MG tablet 30 tablet 2     Sig: Take 1 tablet by mouth Daily for 90 days.   also  cyclobenzaprine 10 mg  Omeprazole 20 mg    When do you need the refill by: [id number]    What additional details did the patient provide when requesting the medication:     Does the patient have less than a 3 day supply:  [x] Yes  [] No    What is the patient's preferred pharmacy: [id number] [place] [address] AT [facility] [address]  [phone] [facility] [phone] FX

## 2021-09-15 NOTE — TELEPHONE ENCOUNTER
Incoming Refill Request      Medication requested (name and dose):   rosuvastatin (Crestor) 5 MG tablet 5 mg, Oral, Daily     levothyroxine (Synthroid) 50 MCG tablet 50 mcg, Oral, Daily       Pharmacy where request should be sent: [facility]    Additional details provided by patient: THIS MEDICATION ISN'T LISTED BUT THERE WAS A FAX FOR THIS MEDICATION    CYCLOBENZPRINE 10MG TABLET TAKE 1 TAB DAILY BY MOUTH 3 TIMES DAILY FOR UP TO 10 DAYS AS NEEDED FOR MUSCLE SPASMS    Best call back number: [phone]    Does the patient have less than a 3 day supply:  [] Yes  [] No    Jazmine Brewer Rep  09/15/21, 11:31 EDT

## 2021-09-21 RX ORDER — LEVOTHYROXINE SODIUM 0.05 MG/1
50 TABLET ORAL DAILY
Qty: 30 TABLET | Refills: 2 | OUTPATIENT
Start: 2021-09-21 | End: 2021-12-20

## 2021-09-21 RX ORDER — ROSUVASTATIN CALCIUM 5 MG/1
5 TABLET, COATED ORAL DAILY
Qty: 30 TABLET | Refills: 2 | OUTPATIENT
Start: 2021-09-21 | End: 2021-12-20

## 2021-09-30 ENCOUNTER — LAB (OUTPATIENT)
Dept: FAMILY MEDICINE CLINIC | Facility: CLINIC | Age: 61
End: 2021-09-30

## 2021-09-30 DIAGNOSIS — E11.9 DIABETES MELLITUS WITHOUT COMPLICATION (HCC): ICD-10-CM

## 2021-09-30 DIAGNOSIS — E03.8 OTHER SPECIFIED HYPOTHYROIDISM: ICD-10-CM

## 2021-09-30 DIAGNOSIS — I10 BENIGN HYPERTENSION: ICD-10-CM

## 2021-09-30 LAB
ALBUMIN SERPL-MCNC: 4.78 G/DL (ref 3.5–5.2)
ALBUMIN/GLOB SERPL: 1.4 G/DL
ALP SERPL-CCNC: 118 U/L (ref 39–117)
ALT SERPL W P-5'-P-CCNC: 11 U/L (ref 1–33)
ANION GAP SERPL CALCULATED.3IONS-SCNC: 12.4 MMOL/L (ref 5–15)
AST SERPL-CCNC: 14 U/L (ref 1–32)
BILIRUB SERPL-MCNC: 0.5 MG/DL (ref 0–1.2)
BUN SERPL-MCNC: 35 MG/DL (ref 8–23)
BUN/CREAT SERPL: 29.7 (ref 7–25)
CALCIUM SPEC-SCNC: 9.8 MG/DL (ref 8.6–10.5)
CHLORIDE SERPL-SCNC: 94 MMOL/L (ref 98–107)
CHOLEST SERPL-MCNC: 166 MG/DL (ref 0–200)
CO2 SERPL-SCNC: 30.6 MMOL/L (ref 22–29)
CREAT SERPL-MCNC: 1.18 MG/DL (ref 0.57–1)
GFR SERPL CREATININE-BSD FRML MDRD: 46 ML/MIN/1.73
GLOBULIN UR ELPH-MCNC: 3.3 GM/DL
GLUCOSE SERPL-MCNC: 360 MG/DL (ref 65–99)
HBA1C MFR BLD: 12.5 % (ref 4.8–5.6)
HDLC SERPL-MCNC: 48 MG/DL (ref 40–60)
LDLC SERPL CALC-MCNC: 94 MG/DL (ref 0–100)
LDLC/HDLC SERPL: 1.9 {RATIO}
POTASSIUM SERPL-SCNC: 3.6 MMOL/L (ref 3.5–5.2)
PROT SERPL-MCNC: 8.1 G/DL (ref 6–8.5)
SODIUM SERPL-SCNC: 137 MMOL/L (ref 136–145)
TRIGL SERPL-MCNC: 133 MG/DL (ref 0–150)
TSH SERPL DL<=0.05 MIU/L-ACNC: 2.54 UIU/ML (ref 0.27–4.2)
VLDLC SERPL-MCNC: 24 MG/DL (ref 5–40)

## 2021-09-30 PROCEDURE — 80061 LIPID PANEL: CPT | Performed by: PSYCHOLOGIST

## 2021-09-30 PROCEDURE — 82306 VITAMIN D 25 HYDROXY: CPT | Performed by: PSYCHOLOGIST

## 2021-09-30 PROCEDURE — 84443 ASSAY THYROID STIM HORMONE: CPT | Performed by: PSYCHOLOGIST

## 2021-09-30 PROCEDURE — 83036 HEMOGLOBIN GLYCOSYLATED A1C: CPT | Performed by: PSYCHOLOGIST

## 2021-09-30 PROCEDURE — 80053 COMPREHEN METABOLIC PANEL: CPT | Performed by: PSYCHOLOGIST

## 2021-10-01 ENCOUNTER — OFFICE VISIT (OUTPATIENT)
Dept: FAMILY MEDICINE CLINIC | Facility: CLINIC | Age: 61
End: 2021-10-01

## 2021-10-01 VITALS
OXYGEN SATURATION: 100 % | DIASTOLIC BLOOD PRESSURE: 72 MMHG | TEMPERATURE: 97.7 F | WEIGHT: 159.8 LBS | HEART RATE: 77 BPM | SYSTOLIC BLOOD PRESSURE: 134 MMHG | HEIGHT: 63 IN | BODY MASS INDEX: 28.31 KG/M2 | RESPIRATION RATE: 20 BRPM

## 2021-10-01 DIAGNOSIS — Z23 FLU VACCINE NEED: ICD-10-CM

## 2021-10-01 DIAGNOSIS — Z76.0 ENCOUNTER FOR MEDICATION REFILL: ICD-10-CM

## 2021-10-01 DIAGNOSIS — E11.9 DIABETES MELLITUS WITHOUT COMPLICATION (HCC): ICD-10-CM

## 2021-10-01 DIAGNOSIS — G47.09 OTHER INSOMNIA: ICD-10-CM

## 2021-10-01 DIAGNOSIS — I10 BENIGN HYPERTENSION: Primary | ICD-10-CM

## 2021-10-01 DIAGNOSIS — R35.0 URINARY FREQUENCY: ICD-10-CM

## 2021-10-01 DIAGNOSIS — R10.11 RIGHT UPPER QUADRANT ABDOMINAL PAIN: ICD-10-CM

## 2021-10-01 PROBLEM — K57.92 DIVERTICULITIS: Status: ACTIVE | Noted: 2021-10-01

## 2021-10-01 LAB
25(OH)D3 SERPL-MCNC: 38.9 NG/ML (ref 30–100)
BILIRUB BLD-MCNC: NEGATIVE MG/DL
CLARITY, POC: CLEAR
COLOR UR: YELLOW
GLUCOSE BLDC GLUCOMTR-MCNC: 371 MG/DL (ref 70–130)
GLUCOSE UR STRIP-MCNC: ABNORMAL MG/DL
KETONES UR QL: NEGATIVE
LEUKOCYTE EST, POC: NEGATIVE
NITRITE UR-MCNC: NEGATIVE MG/ML
PH UR: 5.5 [PH] (ref 5–8)
PROT UR STRIP-MCNC: NEGATIVE MG/DL
RBC # UR STRIP: NEGATIVE /UL
SP GR UR: 1.02 (ref 1–1.03)
UROBILINOGEN UR QL: NORMAL

## 2021-10-01 PROCEDURE — 99213 OFFICE O/P EST LOW 20 MIN: CPT | Performed by: PSYCHOLOGIST

## 2021-10-01 PROCEDURE — 90471 IMMUNIZATION ADMIN: CPT | Performed by: PSYCHOLOGIST

## 2021-10-01 PROCEDURE — 90686 IIV4 VACC NO PRSV 0.5 ML IM: CPT | Performed by: PSYCHOLOGIST

## 2021-10-01 PROCEDURE — 82962 GLUCOSE BLOOD TEST: CPT | Performed by: PSYCHOLOGIST

## 2021-10-01 RX ORDER — CYCLOBENZAPRINE HCL 10 MG
10 TABLET ORAL DAILY
COMMUNITY
End: 2021-10-01 | Stop reason: SDUPTHER

## 2021-10-01 RX ORDER — CIPROFLOXACIN 250 MG/1
250 TABLET, FILM COATED ORAL 2 TIMES DAILY
Qty: 6 TABLET | Refills: 0 | Status: SHIPPED | OUTPATIENT
Start: 2021-10-01 | End: 2021-10-04

## 2021-10-01 RX ORDER — CYCLOBENZAPRINE HCL 10 MG
10 TABLET ORAL DAILY
Qty: 21 TABLET | Refills: 0 | Status: SHIPPED | OUTPATIENT
Start: 2021-10-01 | End: 2021-10-22

## 2021-10-01 RX ORDER — AMLODIPINE BESYLATE 10 MG/1
10 TABLET ORAL DAILY
COMMUNITY
End: 2022-01-11 | Stop reason: SDUPTHER

## 2021-10-01 RX ORDER — LEVOTHYROXINE SODIUM 0.05 MG/1
50 TABLET ORAL DAILY
Qty: 30 TABLET | Refills: 2 | Status: SHIPPED | OUTPATIENT
Start: 2021-10-01 | End: 2021-11-09 | Stop reason: SDUPTHER

## 2021-10-01 RX ORDER — OMEPRAZOLE 20 MG/1
20 CAPSULE, DELAYED RELEASE ORAL DAILY
Qty: 30 CAPSULE | Refills: 2 | Status: SHIPPED | OUTPATIENT
Start: 2021-10-01 | End: 2021-11-09 | Stop reason: SDUPTHER

## 2021-10-01 RX ORDER — ASPIRIN 81 MG/1
81 TABLET ORAL DAILY
COMMUNITY
End: 2022-03-14 | Stop reason: SDUPTHER

## 2021-10-01 RX ORDER — OMEPRAZOLE 20 MG/1
20 CAPSULE, DELAYED RELEASE ORAL DAILY
COMMUNITY
End: 2021-10-01 | Stop reason: SDUPTHER

## 2021-10-01 NOTE — PROGRESS NOTES
Chief Complaint  Hypertension (Follow up, review of lab work)    Subjective          Ernestina Wisdom presents to [facility] c/o follow up on hypertension and diabetes.  2. She is having problem sleeping, her  passed away in May 2021 3. She is having a lot of urinary frequency / urgency. No blood in urine/ belly hurting too in the RUQ area /  Back hurst too. NO nausea/vomiting. Started 2 weeks ago.    Hypertension  This is a chronic problem. The current episode started more than 1 year ago. The problem has been resolved since onset. The problem is controlled. Pertinent negatives include no chest pain, headaches, palpitations or shortness of breath. Risk factors for coronary artery disease include obesity, post-menopausal state, diabetes mellitus and dyslipidemia. Past treatments include calcium channel blockers, diuretics, ACE inhibitors, beta blockers and lifestyle changes. Current antihypertension treatment includes lifestyle changes, diuretics, calcium channel blockers, ACE inhibitors and beta blockers. The current treatment provides significant improvement. There are no compliance problems.  There is no history of angina, kidney disease or CAD/MI. There is no history of chronic renal disease.   Diabetes  She presents for her follow-up diabetic visit. She has type 2 diabetes mellitus. No MedicAlert identification noted. The initial diagnosis of diabetes was made 20 years ago. There are no hypoglycemic associated symptoms. Pertinent negatives for hypoglycemia include no headaches. Associated symptoms include fatigue. Pertinent negatives for diabetes include no chest pain. There are no hypoglycemic complications. Symptoms are improving. There are no diabetic complications. Risk factors for coronary artery disease include obesity, dyslipidemia and hypertension. Current diabetic treatment includes oral agent (monotherapy). She is compliant with treatment all of the time. She is  following a generally healthy diet. When asked about meal planning, she reported none. She has not had a previous visit with a dietitian. She participates in exercise daily. An ACE inhibitor/angiotensin II receptor blocker is being taken. She does not see a podiatrist.Eye exam is current.   Insomnia  This is a new problem. The current episode started more than 1 month ago. The problem occurs constantly. The problem has been gradually worsening. Associated symptoms include abdominal pain and fatigue. Pertinent negatives include no chest pain, fever, headaches, nausea or vomiting. The symptoms are aggravated by stress. The treatment provided mild relief.   Urinary Frequency   This is a new problem. The current episode started 1 to 4 weeks ago. The problem occurs every urination. The problem has been gradually worsening. The quality of the pain is described as burning. The pain is at a severity of 5/10. The pain is mild. There has been no fever. She is not sexually active. There is no history of pyelonephritis. Associated symptoms include a discharge (white vaginal d/c not curdlike/ a little odor), flank pain, frequency, hesitancy and urgency. Pertinent negatives include no hematuria, nausea or vomiting. She has tried acetaminophen for the symptoms. The treatment provided no relief.   Abdominal Pain  This is a new problem. The current episode started 1 to 4 weeks ago. The onset quality is gradual. The problem occurs intermittently. The problem has been gradually worsening. The pain is located in the RUQ. The pain is at a severity of 8/10. The pain is moderate. The quality of the pain is cramping and sharp. The abdominal pain radiates to the right flank. Associated symptoms include frequency. Pertinent negatives include no fever, headaches, hematuria, nausea or vomiting. She has tried acetaminophen for the symptoms. The treatment provided mild relief. There is no history of abdominal surgery.       Objective   Vital  "Signs:   /72 (BP Location: Right arm, Patient Position: Sitting, Cuff Size: Adult)   Pulse 77   Temp 97.7 °F (36.5 °C) (Temporal)   Resp 20   Ht 160 cm (63\")   Wt 72.5 kg (159 lb 12.8 oz)   SpO2 100%   BMI 28.31 kg/m²     Physical Exam  Vitals and nursing note reviewed.   Constitutional:       General: She is not in acute distress.     Appearance: Normal appearance. She is well-developed. She is obese. She is not ill-appearing or diaphoretic.   HENT:      Head: Normocephalic and atraumatic.      Right Ear: Tympanic membrane and external ear normal. There is no impacted cerumen.      Left Ear: Tympanic membrane and external ear normal. There is no impacted cerumen.      Nose: Nose normal. No congestion or rhinorrhea.      Mouth/Throat:      Mouth: Mucous membranes are moist.      Pharynx: No oropharyngeal exudate or posterior oropharyngeal erythema.   Eyes:      General: Vision grossly intact. No scleral icterus.        Right eye: No discharge.         Left eye: No discharge.      Extraocular Movements: Extraocular movements intact.      Conjunctiva/sclera: Conjunctivae normal.      Pupils: Pupils are equal, round, and reactive to light.   Neck:      Trachea: Trachea and phonation normal.   Cardiovascular:      Rate and Rhythm: Normal rate and regular rhythm.      Pulses: Normal pulses.      Heart sounds: Normal heart sounds. No murmur heard.     Pulmonary:      Effort: Pulmonary effort is normal. No accessory muscle usage or respiratory distress.      Breath sounds: Normal breath sounds. No stridor. No wheezing, rhonchi or rales.   Chest:      Chest wall: No tenderness or crepitus.      Breasts:         Right: Normal.         Left: Normal.   Abdominal:      General: Abdomen is protuberant. Bowel sounds are normal. There is no distension.      Palpations: Abdomen is soft.      Tenderness: There is no abdominal tenderness. There is no right CVA tenderness, left CVA tenderness or rebound.      Hernia: " There is no hernia in the left inguinal area or right inguinal area.   Genitourinary:     General: Normal vulva.      Pubic Area: No rash or pubic lice.       Labia:         Right: No rash, tenderness or lesion.         Left: No rash, tenderness or lesion.       Vagina: No vaginal discharge.      Rectum: Normal.   Musculoskeletal:         General: No swelling or tenderness. Normal range of motion.      Cervical back: Normal range of motion and neck supple. No rigidity.      Right lower leg: No edema.      Left lower leg: No edema.   Lymphadenopathy:      Cervical: No cervical adenopathy.   Skin:     General: Skin is warm.      Capillary Refill: Capillary refill takes less than 2 seconds.      Coloration: Skin is not pale.      Findings: No erythema, lesion or rash. Rash is not crusting, macular, nodular or papular.      Nails: There is no clubbing.   Neurological:      General: No focal deficit present.      Mental Status: She is alert and oriented to person, place, and time. Mental status is at baseline.      Cranial Nerves: Cranial nerves are intact.      Sensory: Sensation is intact.      Motor: Motor function is intact. No weakness.      Gait: Gait is intact. Gait normal.      Deep Tendon Reflexes: Reflexes are normal and symmetric.   Psychiatric:         Attention and Perception: Attention and perception normal.         Mood and Affect: Mood normal.         Speech: Speech normal.         Behavior: Behavior normal. Behavior is cooperative.         Thought Content: Thought content normal.         Cognition and Memory: Cognition and memory normal.         Judgment: Judgment normal.        Result Review :   The following data was reviewed by: Edgar Lemus MD on 10/01/2021:  Common labs    Common Labsle 6/7/21 6/7/21 6/7/21 6/7/21 6/7/21 9/30/21 9/30/21 9/30/21    0820 0820 0820 0820 0820 0905 0905 0905   Glucose    307 (A)   360 (A)    BUN    16   35 (A)    Creatinine    0.88   1.18 (A)    eGFR Non African  Am    65   46 (A)    Sodium    136   137    Potassium    4.1   3.6    Chloride    98   94 (A)    Calcium    9.3   9.8    Albumin    4.29   4.78    Total Bilirubin    0.3   0.5    Alkaline Phosphatase    126 (A)   118 (A)    AST (SGOT)    15   14    ALT (SGPT)    11   11    WBC 5.22          Hemoglobin 12.7          Hematocrit 39.4          Platelets 198          Total Cholesterol   176     166   Triglycerides   146     133   HDL Cholesterol   49     48   LDL Cholesterol    101 (A)     94   Hemoglobin A1C  9.40 (A)    12.50 (A)     Microalbumin, Urine     <1.2      (A) Abnormal value            Data reviewed: Radiologic studies 5/24/2021  DEXA          Assessment and Plan    Diagnoses and all orders for this visit:    1. Benign hypertension (Primary)  Assessment & Plan:  Hypertension is improving with treatment.  Continue current treatment regimen.  Dietary sodium restriction.  Weight loss.  Regular aerobic exercise.  Blood pressure will be reassessed at the next regular appointment.      2. Diabetes mellitus without complication (HCC)  Assessment & Plan:  Diabetes is improving with treatment.   Regular aerobic exercise.  Discussed ways to avoid symptomatic hypoglycemia.  Discussed foot care.  Reminded to get yearly retinal exam.  Medication changes per orders.  Diabetes will be reassessed 2 WEEKS     THE PATIENT WAS NOT ABLE TO IMPLEMENT CHANGE IN HER MEDS, JUST STARTED YESTERDAY   AND WE WILL KAE IN 2 WEEKS. FSBS: 317    Orders:  -     POC Glucose    3. Other insomnia    4. Urinary frequency  -     POC Urinalysis Dipstick    5. Right upper quadrant abdominal pain  -     US Gallbladder; Future    6. Flu vaccine need    7. Encounter for medication refill    Other orders  -     FluLaval/Fluarix >6 Months ([id number])  -     cyclobenzaprine (FLEXERIL) 10 MG tablet; Take 1 tablet by mouth Daily for 21 days.  Dispense: 21 tablet; Refill: 0  -     levothyroxine (Synthroid) 50 MCG tablet; Take 1 tablet by mouth Daily for  90 days. TAKE IN THE MORNING WHEN YOU WAKE UP ON AN EMPTY STOMACH  Dispense: 30 tablet; Refill: 2  -     metFORMIN (GLUCOPHAGE) 500 MG tablet; Take 1 tablet by mouth 4 (Four) Times a Day With Meals & at Bedtime for 45 days. TAKE WITH MEALS BREAKFAST AND DINNER  Dispense: 60 tablet; Refill: 2  -     omeprazole (priLOSEC) 20 MG capsule; Take 1 capsule by mouth Daily for 90 days.  Dispense: 30 capsule; Refill: 2    I spent 20 minutes caring for Ernestina on this date of service. This time includes time spent by me in the following activities:reviewing tests, performing a medically appropriate examination and/or evaluation , counseling and educating the patient/family/caregiver, ordering medications, tests, or procedures and documenting information in the medical record     Follow Up   Return in about 2 weeks (around 10/15/2021) for Recheck, DM CHANGE IN MED &  OV 12/10/21  RTC FASTING LABS HTN/LIPIDS/THYROID/INSOMNIA/GRIEF.  Patient was given instructions and counseling regarding her condition or for health maintenance advice. Please see specific information pulled into the AVS if appropriate.         This document has been electronically signed by Edgar Lemus MD  October 1, 2021 10:21 EDT

## 2021-10-01 NOTE — ASSESSMENT & PLAN NOTE
Diabetes is improving with treatment.   Regular aerobic exercise.  Discussed ways to avoid symptomatic hypoglycemia.  Discussed foot care.  Reminded to get yearly retinal exam.  Medication changes per orders.  Diabetes will be reassessed 2 WEEKS     THE PATIENT WAS NOT ABLE TO IMPLEMENT CHANGE IN HER MEDS, JUST STARTED YESTERDAY   AND WE WILL KAE IN 2 WEEKS. FSBS: 317

## 2021-10-04 ENCOUNTER — TELEPHONE (OUTPATIENT)
Dept: FAMILY MEDICINE CLINIC | Facility: CLINIC | Age: 61
End: 2021-10-04

## 2021-10-04 NOTE — TELEPHONE ENCOUNTER
Incoming Refill Request      Medication requested (name and dose):metFORMIN (GLUCOPHAGE) 500 MG tablet     Pharmacy where request should be sent: [facility] [place]    Additional details provided by patient: PHARMACY SAYS YOU NEED TO VERIFY DIRECTIONS HAS 1 TABLET FOUR TIMES DAILY AND 2 TABLETS TWICE DAILY.    Best call back number: [phone]    Does the patient have less than a 3 day supply:  [] Yes  [] No    Jazmine Beasley Rep  10/04/21, 08:53 EDT

## 2021-11-08 RX ORDER — LEVOTHYROXINE SODIUM 0.05 MG/1
50 TABLET ORAL DAILY
Qty: 30 TABLET | Refills: 2 | OUTPATIENT
Start: 2021-11-08 | End: 2022-02-06

## 2021-11-08 RX ORDER — ROSUVASTATIN CALCIUM 5 MG/1
5 TABLET, COATED ORAL DAILY
Qty: 30 TABLET | Refills: 2 | OUTPATIENT
Start: 2021-11-08 | End: 2022-02-06

## 2021-11-08 NOTE — TELEPHONE ENCOUNTER
Incoming Refill Request      Medication requested (name and dose): CYCLOPENZAPRINE 10MG    rosuvastatin (Crestor) 5 MG tablet 5 mg, Oral, Daily     levothyroxine (Synthroid) 50 MCG tablet 50 mcg, Oral, Daily         Pharmacy where request should be sent: [facility] [place]    Additional details provided by patient: 1ST MEDICATION LISTED IS NOT ON PT MED LIST THAT I SEEN    Best call back number: [phone]    Does the patient have less than a 3 day supply:  [x] Yes  [] No    Jazmine Brewer Rep  11/08/21, 12:17 EST

## 2021-11-09 DIAGNOSIS — R07.2 PRECORDIAL PAIN: ICD-10-CM

## 2021-11-09 RX ORDER — ROSUVASTATIN CALCIUM 5 MG/1
5 TABLET, COATED ORAL DAILY
Qty: 30 TABLET | Refills: 2 | Status: SHIPPED | OUTPATIENT
Start: 2021-11-09 | End: 2022-01-11 | Stop reason: SDUPTHER

## 2021-11-09 RX ORDER — OMEPRAZOLE 20 MG/1
20 CAPSULE, DELAYED RELEASE ORAL DAILY
Qty: 30 CAPSULE | Refills: 2 | Status: SHIPPED | OUTPATIENT
Start: 2021-11-09 | End: 2022-01-11 | Stop reason: SDUPTHER

## 2021-11-09 RX ORDER — LEVOTHYROXINE SODIUM 0.05 MG/1
50 TABLET ORAL DAILY
Qty: 30 TABLET | Refills: 2 | Status: SHIPPED | OUTPATIENT
Start: 2021-11-09 | End: 2022-01-11 | Stop reason: SDUPTHER

## 2021-11-09 RX ORDER — CYCLOBENZAPRINE HCL 10 MG
10 TABLET ORAL 3 TIMES DAILY PRN
Qty: 42 TABLET | Refills: 0 | Status: SHIPPED | OUTPATIENT
Start: 2021-11-09 | End: 2021-11-23

## 2021-11-15 ENCOUNTER — OFFICE VISIT (OUTPATIENT)
Dept: FAMILY MEDICINE CLINIC | Facility: CLINIC | Age: 61
End: 2021-11-15

## 2021-11-15 VITALS
DIASTOLIC BLOOD PRESSURE: 73 MMHG | OXYGEN SATURATION: 99 % | SYSTOLIC BLOOD PRESSURE: 143 MMHG | WEIGHT: 262.4 LBS | HEART RATE: 62 BPM | BODY MASS INDEX: 46.49 KG/M2 | HEIGHT: 63 IN | TEMPERATURE: 97.2 F

## 2021-11-15 DIAGNOSIS — Z79.899 MEDICATION DOSE CHANGED: ICD-10-CM

## 2021-11-15 DIAGNOSIS — E11.9 DIABETES MELLITUS WITHOUT COMPLICATION (HCC): Primary | ICD-10-CM

## 2021-11-15 DIAGNOSIS — R10.11 RIGHT UPPER QUADRANT ABDOMINAL PAIN: ICD-10-CM

## 2021-11-15 PROCEDURE — 99213 OFFICE O/P EST LOW 20 MIN: CPT | Performed by: PSYCHOLOGIST

## 2021-11-15 PROCEDURE — 96372 THER/PROPH/DIAG INJ SC/IM: CPT | Performed by: PSYCHOLOGIST

## 2021-11-15 RX ORDER — KETOROLAC TROMETHAMINE 30 MG/ML
60 INJECTION, SOLUTION INTRAMUSCULAR; INTRAVENOUS ONCE
Status: DISCONTINUED | OUTPATIENT
Start: 2021-11-15 | End: 2021-11-15

## 2021-11-15 RX ORDER — KETOROLAC TROMETHAMINE 30 MG/ML
60 INJECTION, SOLUTION INTRAMUSCULAR; INTRAVENOUS ONCE
Status: COMPLETED | OUTPATIENT
Start: 2021-11-15 | End: 2021-11-15

## 2021-11-15 RX ORDER — DULAGLUTIDE 1.5 MG/.5ML
1.5 INJECTION, SOLUTION SUBCUTANEOUS WEEKLY
Qty: 2 ML | Refills: 2 | Status: SHIPPED | OUTPATIENT
Start: 2021-11-15 | End: 2022-01-11 | Stop reason: SDUPTHER

## 2021-11-15 RX ORDER — PYRAZINAMIDE 500 MG/1
1 TABLET ORAL EVERY 6 HOURS PRN
Qty: 21 TABLET | Refills: 0
Start: 2021-11-15 | End: 2021-11-16 | Stop reason: SDUPTHER

## 2021-11-15 RX ADMIN — KETOROLAC TROMETHAMINE 60 MG: 30 INJECTION, SOLUTION INTRAMUSCULAR; INTRAVENOUS at 16:51

## 2021-11-15 NOTE — ASSESSMENT & PLAN NOTE
Diabetes is improving with treatment.   Dietary recommendations for ADA diet.  Regular aerobic exercise.  Discussed foot care.  Reminded to get yearly retinal exam.  Diabetes will be reassessed in 3 months.    MEDICATION MODIFIED THIS VISIT: METFORMIN 500MG TID AND TRULICITY 1.5 Q WEEKLY  KAE IN 3 MOS LABS

## 2021-11-15 NOTE — PROGRESS NOTES
"Chief Complaint  Follow-up (Ultrasound results)    Subjective          Ernestina Wisdom presents to [facility] c/o follow up abdominal pain 2.  TYPE 2 DM:   Abdominal Pain  This is a new problem. The current episode started 1 to 4 weeks ago. The onset quality is gradual. The problem occurs constantly. The most recent episode lasted 4 hours. The problem has been waxing and waning. The pain is located in the RUQ. The pain is at a severity of 8/10. The pain is moderate. The quality of the pain is aching and sharp. The abdominal pain does not radiate. Associated symptoms include nausea. Pertinent negatives include no dysuria, fever, frequency or vomiting. She has tried H2 blockers for the symptoms. The treatment provided mild relief. There is no history of abdominal surgery.   Diabetes  She presents for her follow-up diabetic visit. She has type 2 diabetes mellitus. No MedicAlert identification noted. The initial diagnosis of diabetes was made 20 years ago. Her disease course has been fluctuating. There are no hypoglycemic associated symptoms. There are no diabetic associated symptoms. There are no hypoglycemic complications. Symptoms are stable. There are no diabetic complications. Risk factors for coronary artery disease include obesity, hypertension and dyslipidemia. Current diabetic treatment includes oral agent (monotherapy) and diet. She is compliant with treatment all of the time. She is following a generally healthy diet. When asked about meal planning, she reported none. She has not had a previous visit with a dietitian. An ACE inhibitor/angiotensin II receptor blocker is being taken. She does not see a podiatrist.Eye exam is current.       Objective   Vital Signs:   /73 (BP Location: Left arm, Patient Position: Sitting, Cuff Size: Adult)   Pulse 62   Temp 97.2 °F (36.2 °C) (Temporal)   Ht 160 cm (63\")   Wt 119 kg (262 lb 6.4 oz)   SpO2 99%   BMI 46.48 kg/m²     Physical " Exam  Vitals and nursing note reviewed.   Constitutional:       General: She is awake.      Appearance: Normal appearance. She is well-developed and well-groomed. She is obese.   HENT:      Head: Normocephalic and atraumatic.      Jaw: There is normal jaw occlusion.      Nose: Nose normal.      Mouth/Throat:      Mouth: Mucous membranes are moist.      Pharynx: Oropharynx is clear.   Eyes:      General: Lids are normal. Vision grossly intact. Gaze aligned appropriately.      Extraocular Movements: Extraocular movements intact.      Conjunctiva/sclera: Conjunctivae normal.      Pupils: Pupils are equal, round, and reactive to light.   Neck:      Trachea: Trachea and phonation normal.   Cardiovascular:      Rate and Rhythm: Normal rate and regular rhythm.      Pulses: Normal pulses.      Heart sounds: Normal heart sounds.   Pulmonary:      Effort: Pulmonary effort is normal.      Breath sounds: Normal breath sounds.   Abdominal:      General: Abdomen is protuberant. Bowel sounds are normal.      Palpations: Abdomen is soft.      Tenderness: There is abdominal tenderness in the right upper quadrant. Positive signs include Sharp's sign.   Genitourinary:     Rectum: Normal.   Musculoskeletal:         General: Normal range of motion.      Cervical back: Full passive range of motion without pain, normal range of motion and neck supple.   Lymphadenopathy:      Cervical: No cervical adenopathy.   Skin:     General: Skin is warm.      Capillary Refill: Capillary refill takes less than 2 seconds.   Neurological:      General: No focal deficit present.      Mental Status: She is alert and oriented to person, place, and time.      Cranial Nerves: Cranial nerves are intact.      Sensory: Sensation is intact.      Motor: Motor function is intact.      Coordination: Coordination is intact.      Gait: Gait is intact.      Deep Tendon Reflexes: Reflexes are normal and symmetric.   Psychiatric:         Attention and Perception:  Attention and perception normal.         Mood and Affect: Mood and affect normal.         Speech: Speech normal.         Behavior: Behavior normal.         Thought Content: Thought content normal.         Cognition and Memory: Cognition and memory normal.         Judgment: Judgment normal.        Result Review :   The following data was reviewed by: Edgar Lemus MD on 11/15/2021:  Common labs    Common Labsle 6/7/21 6/7/21 6/7/21 6/7/21 6/7/21 9/30/21 9/30/21 9/30/21    0820 0820 0820 0820 0820 0905 0905 0905   Glucose    307 (A)   360 (A)    BUN    16   35 (A)    Creatinine    0.88   1.18 (A)    eGFR Non  Am    65   46 (A)    Sodium    136   137    Potassium    4.1   3.6    Chloride    98   94 (A)    Calcium    9.3   9.8    Albumin    4.29   4.78    Total Bilirubin    0.3   0.5    Alkaline Phosphatase    126 (A)   118 (A)    AST (SGOT)    15   14    ALT (SGPT)    11   11    WBC 5.22          Hemoglobin 12.7          Hematocrit 39.4          Platelets 198          Total Cholesterol   176     166   Triglycerides   146     133   HDL Cholesterol   49     48   LDL Cholesterol    101 (A)     94   Hemoglobin A1C  9.40 (A)    12.50 (A)     Microalbumin, Urine     <1.2      (A) Abnormal value            Data reviewed: Radiologic studies 10/1/2021 [facility] ultrasound          Assessment and Plan    Diagnoses and all orders for this visit:    1. Diabetes mellitus without complication (HCC) (Primary)  Assessment & Plan:  Diabetes is improving with treatment.   Dietary recommendations for ADA diet.  Regular aerobic exercise.  Discussed foot care.  Reminded to get yearly retinal exam.  Diabetes will be reassessed in 3 months.    MEDICATION MODIFIED THIS VISIT: METFORMIN 500MG TID AND TRULICITY 1.5 Q WEEKLY  KAE IN 3 [facility] LABS       2. Right upper quadrant abdominal pain  -     CT Abdomen Pelvis With Contrast; Future  -     ketorolac (TORADOL) injection 60 mg  -     acetaminophen-codeine (TYLENOL/CODEINE #3) 300-30 MG  per tablet; Take 1 tablet by mouth Every 6 (Six) Hours As Needed for Moderate Pain  for up to 10 days.  Dispense: 21 tablet; Refill: 0    3. Medication dose changed    Other orders  -     Dulaglutide (Trulicity) 1.5 MG/0.5ML solution pen-injector; Inject 1.5 mg under the skin into the appropriate area as directed 1 (One) Time Per Week for 84 days.  Dispense: 2 mL; Refill: 2    I spent 20 minutes caring for Ernestina on this date of service. This time includes time spent by me in the following activities:reviewing tests, performing a medically appropriate examination and/or evaluation , counseling and educating the patient/family/caregiver, ordering medications, tests, or procedures and documenting information in the medical record     Follow Up   Return in about 2 months (around 1/15/2022) for Recheck, RTC FASTING LABS (n TYPE 2DM/ LIPIDS/ HTN/ RUQ PAIN ) MED REFILL .  Patient was given instructions and counseling regarding her condition or for health maintenance advice. Please see specific information pulled into the AVS if appropriate.         This document has been electronically signed by Edgar Lemus MD  November 15, 2021 16:23 EST

## 2021-11-16 ENCOUNTER — TELEPHONE (OUTPATIENT)
Dept: FAMILY MEDICINE CLINIC | Facility: CLINIC | Age: 61
End: 2021-11-16

## 2021-11-16 DIAGNOSIS — R10.11 RIGHT UPPER QUADRANT ABDOMINAL PAIN: ICD-10-CM

## 2021-11-16 RX ORDER — PYRAZINAMIDE 500 MG/1
1 TABLET ORAL EVERY 6 HOURS PRN
Qty: 21 TABLET | Refills: 0
Start: 2021-11-16 | End: 2021-11-18 | Stop reason: SDUPTHER

## 2021-11-17 NOTE — TELEPHONE ENCOUNTER
Caller: [id number] - [place], [place] - [address] - [phone] [facility] [phone] FX    Relationship: Pharmacy    Best call back number: [phone]    What medication are you requesting: TYLENOL 3 WITH CODIENE     What are your current symptoms: UNKNOWN    How long have you been experiencing symptoms:     Have you had these symptoms before:    [] Yes  [x] No    Have you been treated for these symptoms before:   [] Yes  [x] No    If a prescription is needed, what is your preferred pharmacy and phone number:  [id number] - [place], [place] - [address] - [phone] [facility] [phone] FX

## 2021-11-18 DIAGNOSIS — R10.11 RIGHT UPPER QUADRANT ABDOMINAL PAIN: ICD-10-CM

## 2021-11-18 RX ORDER — PYRAZINAMIDE 500 MG/1
1 TABLET ORAL EVERY 6 HOURS PRN
Qty: 21 TABLET | Refills: 0 | Status: SHIPPED | OUTPATIENT
Start: 2021-11-18 | End: 2021-11-28

## 2021-11-18 RX ORDER — PYRAZINAMIDE 500 MG/1
1 TABLET ORAL EVERY 6 HOURS PRN
Qty: 21 TABLET | Refills: 0
Start: 2021-11-18 | End: 2021-11-18 | Stop reason: SDUPTHER

## 2021-11-18 RX ORDER — ACETAMINOPHEN AND CODEINE PHOSPHATE 300; 30 MG/1; MG/1
1 TABLET ORAL EVERY 6 HOURS PRN
Qty: 21 TABLET | Refills: 0 | Status: CANCELLED | OUTPATIENT
Start: 2021-11-18 | End: 2021-11-28

## 2021-11-18 NOTE — TELEPHONE ENCOUNTER
Rx Refill Note  Requested Prescriptions     Pending Prescriptions Disp Refills   • acetaminophen-codeine (TYLENOL/CODEINE #3) 300-30 MG per tablet 21 tablet 0     Sig: Take 1 tablet by mouth Every 6 (Six) Hours As Needed for Moderate Pain  for up to 10 days.      Last office visit with prescribing clinician: 11/15/2021      Next office visit with prescribing clinician: 1/17/2022            Jazmine Mendoza Rep  11/18/21, 11:33 EST

## 2021-12-23 ENCOUNTER — TELEPHONE (OUTPATIENT)
Dept: FAMILY MEDICINE CLINIC | Facility: CLINIC | Age: 61
End: 2021-12-23

## 2021-12-29 ENCOUNTER — OFFICE VISIT (OUTPATIENT)
Dept: FAMILY MEDICINE CLINIC | Facility: CLINIC | Age: 61
End: 2021-12-29

## 2021-12-29 VITALS
HEART RATE: 115 BPM | WEIGHT: 147 LBS | RESPIRATION RATE: 20 BRPM | BODY MASS INDEX: 26.05 KG/M2 | DIASTOLIC BLOOD PRESSURE: 85 MMHG | HEIGHT: 63 IN | SYSTOLIC BLOOD PRESSURE: 148 MMHG | TEMPERATURE: 98.4 F | OXYGEN SATURATION: 98 %

## 2021-12-29 DIAGNOSIS — J01.00 ACUTE MAXILLARY SINUSITIS, RECURRENCE NOT SPECIFIED: ICD-10-CM

## 2021-12-29 DIAGNOSIS — J34.89 SINUS DRAINAGE: ICD-10-CM

## 2021-12-29 DIAGNOSIS — Z20.822 CLOSE EXPOSURE TO COVID-19 VIRUS: Primary | ICD-10-CM

## 2021-12-29 DIAGNOSIS — Z20.822 CLOSE EXPOSURE TO COVID-19 VIRUS: ICD-10-CM

## 2021-12-29 PROCEDURE — U0004 COV-19 TEST NON-CDC HGH THRU: HCPCS | Performed by: PSYCHOLOGIST

## 2021-12-29 PROCEDURE — 99213 OFFICE O/P EST LOW 20 MIN: CPT | Performed by: PSYCHOLOGIST

## 2021-12-29 PROCEDURE — 96372 THER/PROPH/DIAG INJ SC/IM: CPT | Performed by: PSYCHOLOGIST

## 2021-12-29 RX ORDER — PREDNISONE 20 MG/1
20 TABLET ORAL 2 TIMES DAILY
Qty: 10 TABLET | Refills: 0 | Status: SHIPPED | OUTPATIENT
Start: 2021-12-29 | End: 2022-01-03

## 2021-12-29 RX ORDER — AZITHROMYCIN 250 MG/1
TABLET, FILM COATED ORAL
Qty: 6 TABLET | Refills: 1 | Status: SHIPPED | OUTPATIENT
Start: 2021-12-29 | End: 2022-01-11

## 2021-12-29 RX ORDER — GUAIFENESIN AND CODEINE PHOSPHATE 100; 10 MG/5ML; MG/5ML
5 SOLUTION ORAL 3 TIMES DAILY
Qty: 150 ML | Refills: 0 | Status: SHIPPED | OUTPATIENT
Start: 2021-12-29 | End: 2022-01-08

## 2021-12-29 RX ORDER — DEXAMETHASONE SODIUM PHOSPHATE 4 MG/ML
8 INJECTION, SOLUTION INTRA-ARTICULAR; INTRALESIONAL; INTRAMUSCULAR; INTRAVENOUS; SOFT TISSUE ONCE
Status: COMPLETED | OUTPATIENT
Start: 2021-12-29 | End: 2021-12-29

## 2021-12-29 RX ADMIN — DEXAMETHASONE SODIUM PHOSPHATE 8 MG: 4 INJECTION, SOLUTION INTRA-ARTICULAR; INTRALESIONAL; INTRAMUSCULAR; INTRAVENOUS; SOFT TISSUE at 11:24

## 2021-12-29 NOTE — PROGRESS NOTES
"Chief Complaint  Generalized Body Aches (x 2 days, exposed to covid), Sinusitis, and Fatigue    Subjective          Ernestina Wisdom presents to [facility] c/o exposure to Covid with 2 family members 2. Body aches/ sinus drainage and fatigue:  Sinusitis  This is a new problem. The current episode started in the past 7 days. The problem has been waxing and waning since onset. There has been no fever. Associated symptoms include congestion, coughing and sinus pressure. Pertinent negatives include no shortness of breath. Past treatments include acetaminophen. The treatment provided mild relief.   Fatigue  This is a new problem. The current episode started in the past 7 days. The problem occurs constantly. The problem has been waxing and waning. Associated symptoms include congestion, coughing and fatigue. She has tried nothing for the symptoms.       Objective   Vital Signs:   /85 (BP Location: Left arm, Patient Position: Sitting, Cuff Size: Adult)   Pulse 115   Temp 98.4 °F (36.9 °C) (Temporal)   Resp 20   Ht 160 cm (63\")   Wt 66.7 kg (147 lb)   SpO2 98%   BMI 26.04 kg/m²     Physical Exam  Vitals and nursing note reviewed.   Constitutional:       General: She is awake.      Appearance: Normal appearance. She is well-developed and well-groomed. She is obese.   HENT:      Head: Normocephalic and atraumatic.      Jaw: There is normal jaw occlusion.      Nose: Nose normal.      Mouth/Throat:      Mouth: Mucous membranes are moist.      Pharynx: Oropharynx is clear.   Eyes:      General: Lids are normal. Vision grossly intact. Gaze aligned appropriately.      Extraocular Movements: Extraocular movements intact.      Conjunctiva/sclera: Conjunctivae normal.      Pupils: Pupils are equal, round, and reactive to light.   Neck:      Trachea: Trachea and phonation normal.   Cardiovascular:      Rate and Rhythm: Normal rate and regular rhythm.      Pulses: Normal pulses.      Heart " sounds: Normal heart sounds.   Pulmonary:      Effort: Pulmonary effort is normal.      Breath sounds: Normal breath sounds.   Abdominal:      General: Abdomen is protuberant. Bowel sounds are normal.      Palpations: Abdomen is soft.   Genitourinary:     Rectum: Normal.   Musculoskeletal:         General: Normal range of motion.      Cervical back: Full passive range of motion without pain, normal range of motion and neck supple.   Lymphadenopathy:      Cervical: No cervical adenopathy.   Skin:     General: Skin is warm.      Capillary Refill: Capillary refill takes less than 2 seconds.   Neurological:      General: No focal deficit present.      Mental Status: She is alert and oriented to person, place, and time.      Cranial Nerves: Cranial nerves are intact.      Sensory: Sensation is intact.      Motor: Motor function is intact.      Coordination: Coordination is intact.      Gait: Gait is intact.      Deep Tendon Reflexes: Reflexes are normal and symmetric.   Psychiatric:         Attention and Perception: Attention and perception normal.         Mood and Affect: Mood and affect normal.         Speech: Speech normal.         Behavior: Behavior normal.         Thought Content: Thought content normal.         Cognition and Memory: Cognition and memory normal.         Judgment: Judgment normal.        Result Review :   The following data was reviewed by: Edgar Lemus MD on 12/29/2021:  Common labs    Common Labsle 6/7/21 6/7/21 6/7/21 6/7/21 6/7/21 9/30/21 9/30/21 9/30/21    0820 0820 0820 0820 0820 0905 0905 0905   Glucose    307 (A)   360 (A)    BUN    16   35 (A)    Creatinine    0.88   1.18 (A)    eGFR Non  Am    65   46 (A)    Sodium    136   137    Potassium    4.1   3.6    Chloride    98   94 (A)    Calcium    9.3   9.8    Albumin    4.29   4.78    Total Bilirubin    0.3   0.5    Alkaline Phosphatase    126 (A)   118 (A)    AST (SGOT)    15   14    ALT (SGPT)    11   11    WBC 5.22           Hemoglobin 12.7          Hematocrit 39.4          Platelets 198          Total Cholesterol   176     166   Triglycerides   146     133   HDL Cholesterol   49     48   LDL Cholesterol    101 (A)     94   Hemoglobin A1C  9.40 (A)    12.50 (A)     Microalbumin, Urine     <1.2      (A) Abnormal value                Assessment and Plan    Diagnoses and all orders for this visit:    1. Close exposure to COVID-19 virus (Primary)    2. Sinus drainage    3. Acute maxillary sinusitis, recurrence not specified      I spent 20 minutes caring for Ernestina on this date of service. This time includes time spent by me in the following activities:reviewing tests, performing a medically appropriate examination and/or evaluation , counseling and educating the patient/family/caregiver, ordering medications, tests, or procedures and documenting information in the medical record     Follow Up   Return if symptoms worsen or fail to improve/ER, for ALready has an appt.  Patient was given instructions and counseling regarding her condition or for health maintenance advice. Please see specific information pulled into the AVS if appropriate.         This document has been electronically signed by Edgar Lemus MD  December 29, 2021 11:14 EST

## 2021-12-31 LAB — SARS-COV-2 RNA PNL SPEC NAA+PROBE: DETECTED

## 2022-01-03 NOTE — PROGRESS NOTES
Patient called regarding COVID-19 results which is positive.  This patient has had COVID-19 positive in the past but never hospitalized.  Patient reassured and already given medication on her last office visit with me.  CDC guidelines for COVID-19 was also given to the patient.

## 2022-01-10 ENCOUNTER — TELEPHONE (OUTPATIENT)
Dept: FAMILY MEDICINE CLINIC | Facility: CLINIC | Age: 62
End: 2022-01-10

## 2022-01-10 NOTE — TELEPHONE ENCOUNTER
Incoming Refill Request      Medication requested (name and dose):    amLODIPine (NORVASC) 10 MG tablet 10 mg, Oral, Daily     aspirin 81 MG EC tablet 81 mg, Oral, Daily     levothyroxine (Synthroid) 50 MCG tablet  cyclobenzaprine 10mg tablet   Freestyle lancets   Freestyle lite 50 count strips  losartan-hydrochlorothiazide (HYZAAR) 100-25 MG per tablet ()  metFORMIN (GLUCOPHAGE) 500 MG tablet  omeprazole (priLOSEC) 20 MG capsule  rosuvastatin (Crestor) 5 MG tablet  Trulicity .75 mg/.5 ml pen    Pharmacy where request should be sent:     Additional details provided by patient: pharmacy states they need your approval for these medications in order to synchronize the medications and initiate packaging     Best call back number: [phone]    Does the patient have less than a 3 day supply:  [] Yes  [] No    Jazmine Beasley Rep  01/10/22, 08:04 EST

## 2022-01-11 ENCOUNTER — OFFICE VISIT (OUTPATIENT)
Dept: FAMILY MEDICINE CLINIC | Facility: CLINIC | Age: 62
End: 2022-01-11

## 2022-01-11 VITALS
HEIGHT: 63 IN | WEIGHT: 156.8 LBS | TEMPERATURE: 96.6 F | HEART RATE: 79 BPM | OXYGEN SATURATION: 97 % | RESPIRATION RATE: 18 BRPM | SYSTOLIC BLOOD PRESSURE: 126 MMHG | BODY MASS INDEX: 27.78 KG/M2 | DIASTOLIC BLOOD PRESSURE: 64 MMHG

## 2022-01-11 DIAGNOSIS — Z76.0 ENCOUNTER FOR MEDICATION REFILL: ICD-10-CM

## 2022-01-11 DIAGNOSIS — H92.09 OTALGIA, UNSPECIFIED LATERALITY: ICD-10-CM

## 2022-01-11 DIAGNOSIS — R07.2 PRECORDIAL PAIN: ICD-10-CM

## 2022-01-11 DIAGNOSIS — K21.9 GASTROESOPHAGEAL REFLUX DISEASE WITHOUT ESOPHAGITIS: ICD-10-CM

## 2022-01-11 DIAGNOSIS — J01.00 ACUTE MAXILLARY SINUSITIS, RECURRENCE NOT SPECIFIED: ICD-10-CM

## 2022-01-11 DIAGNOSIS — E11.9 DIABETES MELLITUS WITHOUT COMPLICATION: ICD-10-CM

## 2022-01-11 DIAGNOSIS — I10 BENIGN HYPERTENSION: ICD-10-CM

## 2022-01-11 DIAGNOSIS — G25.81 RESTLESS LEG SYNDROME: ICD-10-CM

## 2022-01-11 DIAGNOSIS — E78.2 MIXED HYPERLIPIDEMIA: Primary | ICD-10-CM

## 2022-01-11 DIAGNOSIS — E03.8 OTHER SPECIFIED HYPOTHYROIDISM: ICD-10-CM

## 2022-01-11 DIAGNOSIS — L98.9 SKIN LESION OF CHEEK: ICD-10-CM

## 2022-01-11 DIAGNOSIS — R00.2 PALPITATIONS: ICD-10-CM

## 2022-01-11 LAB
ALBUMIN SERPL-MCNC: 4.29 G/DL (ref 3.5–5.2)
ALBUMIN/GLOB SERPL: 1.3 G/DL
ALP SERPL-CCNC: 103 U/L (ref 39–117)
ALT SERPL W P-5'-P-CCNC: 12 U/L (ref 1–33)
ANION GAP SERPL CALCULATED.3IONS-SCNC: 11.8 MMOL/L (ref 5–15)
AST SERPL-CCNC: 19 U/L (ref 1–32)
BASOPHILS # BLD AUTO: 0.05 10*3/MM3 (ref 0–0.2)
BASOPHILS NFR BLD AUTO: 0.6 % (ref 0–1.5)
BILIRUB SERPL-MCNC: 0.3 MG/DL (ref 0–1.2)
BUN SERPL-MCNC: 20 MG/DL (ref 8–23)
BUN/CREAT SERPL: 26 (ref 7–25)
CALCIUM SPEC-SCNC: 9.5 MG/DL (ref 8.6–10.5)
CHLORIDE SERPL-SCNC: 101 MMOL/L (ref 98–107)
CHOLEST SERPL-MCNC: 203 MG/DL (ref 0–200)
CO2 SERPL-SCNC: 26.2 MMOL/L (ref 22–29)
CREAT SERPL-MCNC: 0.77 MG/DL (ref 0.57–1)
DEPRECATED RDW RBC AUTO: 40.3 FL (ref 37–54)
EOSINOPHIL # BLD AUTO: 0.06 10*3/MM3 (ref 0–0.4)
EOSINOPHIL NFR BLD AUTO: 0.7 % (ref 0.3–6.2)
ERYTHROCYTE [DISTWIDTH] IN BLOOD BY AUTOMATED COUNT: 12.9 % (ref 12.3–15.4)
GFR SERPL CREATININE-BSD FRML MDRD: 76 ML/MIN/1.73
GLOBULIN UR ELPH-MCNC: 3.2 GM/DL
GLUCOSE SERPL-MCNC: 143 MG/DL (ref 65–99)
HBA1C MFR BLD: 8.7 % (ref 4.8–5.6)
HCT VFR BLD AUTO: 40.2 % (ref 34–46.6)
HDLC SERPL-MCNC: 55 MG/DL (ref 40–60)
HGB BLD-MCNC: 13.1 G/DL (ref 12–15.9)
IMM GRANULOCYTES # BLD AUTO: 0.03 10*3/MM3 (ref 0–0.05)
IMM GRANULOCYTES NFR BLD AUTO: 0.4 % (ref 0–0.5)
LDLC SERPL CALC-MCNC: 123 MG/DL (ref 0–100)
LDLC/HDLC SERPL: 2.18 {RATIO}
LYMPHOCYTES # BLD AUTO: 2.83 10*3/MM3 (ref 0.7–3.1)
LYMPHOCYTES NFR BLD AUTO: 34.8 % (ref 19.6–45.3)
MCH RBC QN AUTO: 28.3 PG (ref 26.6–33)
MCHC RBC AUTO-ENTMCNC: 32.6 G/DL (ref 31.5–35.7)
MCV RBC AUTO: 86.8 FL (ref 79–97)
MONOCYTES # BLD AUTO: 0.47 10*3/MM3 (ref 0.1–0.9)
MONOCYTES NFR BLD AUTO: 5.8 % (ref 5–12)
NEUTROPHILS NFR BLD AUTO: 4.7 10*3/MM3 (ref 1.7–7)
NEUTROPHILS NFR BLD AUTO: 57.7 % (ref 42.7–76)
NRBC BLD AUTO-RTO: 0 /100 WBC (ref 0–0.2)
PLATELET # BLD AUTO: 284 10*3/MM3 (ref 140–450)
PMV BLD AUTO: 9.6 FL (ref 6–12)
POTASSIUM SERPL-SCNC: 4.3 MMOL/L (ref 3.5–5.2)
PROT SERPL-MCNC: 7.5 G/DL (ref 6–8.5)
RBC # BLD AUTO: 4.63 10*6/MM3 (ref 3.77–5.28)
SODIUM SERPL-SCNC: 139 MMOL/L (ref 136–145)
TRIGL SERPL-MCNC: 141 MG/DL (ref 0–150)
TSH SERPL DL<=0.05 MIU/L-ACNC: 1.78 UIU/ML (ref 0.27–4.2)
VLDLC SERPL-MCNC: 25 MG/DL (ref 5–40)
WBC NRBC COR # BLD: 8.14 10*3/MM3 (ref 3.4–10.8)

## 2022-01-11 PROCEDURE — 80061 LIPID PANEL: CPT | Performed by: PSYCHOLOGIST

## 2022-01-11 PROCEDURE — 80050 GENERAL HEALTH PANEL: CPT | Performed by: PSYCHOLOGIST

## 2022-01-11 PROCEDURE — 82306 VITAMIN D 25 HYDROXY: CPT | Performed by: PSYCHOLOGIST

## 2022-01-11 PROCEDURE — 83036 HEMOGLOBIN GLYCOSYLATED A1C: CPT | Performed by: PSYCHOLOGIST

## 2022-01-11 PROCEDURE — 82043 UR ALBUMIN QUANTITATIVE: CPT | Performed by: PSYCHOLOGIST

## 2022-01-11 PROCEDURE — 82607 VITAMIN B-12: CPT | Performed by: PSYCHOLOGIST

## 2022-01-11 PROCEDURE — 99213 OFFICE O/P EST LOW 20 MIN: CPT | Performed by: PSYCHOLOGIST

## 2022-01-11 RX ORDER — OMEPRAZOLE 20 MG/1
20 CAPSULE, DELAYED RELEASE ORAL DAILY
Qty: 30 CAPSULE | Refills: 3 | Status: SHIPPED | OUTPATIENT
Start: 2022-01-11 | End: 2022-02-07

## 2022-01-11 RX ORDER — AMOXICILLIN 875 MG/1
875 TABLET, COATED ORAL 2 TIMES DAILY
Qty: 20 TABLET | Refills: 0 | Status: SHIPPED | OUTPATIENT
Start: 2022-01-11 | End: 2022-01-21

## 2022-01-11 RX ORDER — LEVOTHYROXINE SODIUM 0.05 MG/1
50 TABLET ORAL DAILY
Qty: 30 TABLET | Refills: 3 | Status: SHIPPED | OUTPATIENT
Start: 2022-01-11 | End: 2022-05-27 | Stop reason: SDUPTHER

## 2022-01-11 RX ORDER — MECLIZINE HYDROCHLORIDE 25 MG/1
25 TABLET ORAL 3 TIMES DAILY PRN
Qty: 21 TABLET | Refills: 0 | Status: SHIPPED | OUTPATIENT
Start: 2022-01-11 | End: 2022-01-18

## 2022-01-11 RX ORDER — ROSUVASTATIN CALCIUM 5 MG/1
5 TABLET, COATED ORAL
Qty: 30 TABLET | Refills: 3 | Status: SHIPPED | OUTPATIENT
Start: 2022-01-11 | End: 2022-05-27 | Stop reason: SDUPTHER

## 2022-01-11 RX ORDER — AMLODIPINE BESYLATE 10 MG/1
10 TABLET ORAL
Qty: 30 TABLET | Refills: 3 | Status: SHIPPED | OUTPATIENT
Start: 2022-01-11 | End: 2022-05-11

## 2022-01-11 RX ORDER — METOPROLOL SUCCINATE 25 MG/1
25 TABLET, EXTENDED RELEASE ORAL
Qty: 30 TABLET | Refills: 3 | Status: SHIPPED | OUTPATIENT
Start: 2022-01-11 | End: 2022-05-27 | Stop reason: SDUPTHER

## 2022-01-11 RX ORDER — LOSARTAN POTASSIUM AND HYDROCHLOROTHIAZIDE 25; 100 MG/1; MG/1
1 TABLET ORAL
Qty: 30 TABLET | Refills: 3 | Status: SHIPPED | OUTPATIENT
Start: 2022-01-11 | End: 2022-01-13 | Stop reason: SDUPTHER

## 2022-01-11 RX ORDER — DULAGLUTIDE 1.5 MG/.5ML
1.5 INJECTION, SOLUTION SUBCUTANEOUS WEEKLY
Qty: 2 ML | Refills: 3 | Status: SHIPPED | OUTPATIENT
Start: 2022-01-11 | End: 2022-02-07

## 2022-01-11 NOTE — ASSESSMENT & PLAN NOTE
Diabetes is improving with treatment.   Dietary recommendations for ADA diet.  Regular aerobic exercise.  Discussed foot care.  Reminded to get yearly retinal exam.  Medication changes per orders.  Diabetes will be reassessed in 3 months.

## 2022-01-11 NOTE — PROGRESS NOTES
Chief Complaint  Follow-up (r/t diabetes, labs)    Subjective          Ernestina Wisdom presents to [facility] for chronic illness 2. Labs 3. Med refill 4. An acute care visit with dizziness/numbness in head. No injury / fall  Hyperlipidemia  This is a chronic problem. The current episode started more than 1 year ago. The problem is controlled. Recent lipid tests were reviewed and are normal. Exacerbating diseases include diabetes, hypothyroidism and obesity. She has no history of chronic renal disease. Pertinent negatives include no chest pain, leg pain or shortness of breath. Current antihyperlipidemic treatment includes statins. The current treatment provides significant improvement of lipids. There are no compliance problems.  Risk factors for coronary artery disease include obesity, diabetes mellitus and hypertension.   Hypertension  This is a chronic problem. The current episode started more than 1 year ago. The problem has been resolved since onset. The problem is controlled. Associated symptoms include headaches (sinus). Pertinent negatives include no chest pain, palpitations or shortness of breath. Risk factors for coronary artery disease include obesity, diabetes mellitus and dyslipidemia. Past treatments include lifestyle changes, calcium channel blockers and beta blockers. Current antihypertension treatment includes lifestyle changes, beta blockers and calcium channel blockers. The current treatment provides significant improvement. There are no compliance problems.  There is no history of angina, kidney disease or CAD/MI. Identifiable causes of hypertension include a thyroid problem. There is no history of chronic renal disease.   Diabetes  She presents for her follow-up diabetic visit. She has type 2 diabetes mellitus. No MedicAlert identification noted. The initial diagnosis of diabetes was made 20 years ago. Her disease course has been stable. Hypoglycemia symptoms  "include dizziness and headaches (sinus). There are no diabetic associated symptoms. Pertinent negatives for diabetes include no chest pain and no fatigue. There are no hypoglycemic complications. There are no diabetic complications. Risk factors for coronary artery disease include hypertension, obesity and dyslipidemia. Current diabetic treatment includes diet and oral agent (monotherapy). She is compliant with treatment all of the time. She is following a generally healthy diet. When asked about meal planning, she reported none. She has not had a previous visit with a dietitian. She participates in exercise every other day. An ACE inhibitor/angiotensin II receptor blocker is not being taken. She does not see a podiatrist.Eye exam is current.   Hypothyroidism  This is a chronic problem. The current episode started more than 1 year ago. Associated symptoms include headaches (sinus) and numbness (in head noted). Pertinent negatives include no chest pain, congestion, coughing, fatigue or fever. The treatment provided significant relief.   Dizziness  This is a new problem. The current episode started 1 to 4 weeks ago. The problem has been waxing and waning. Associated symptoms include headaches (sinus) and numbness (in head noted). Pertinent negatives include no chest pain, congestion, coughing, fatigue or fever. She has tried acetaminophen for the symptoms. The treatment provided mild relief.   It feels like she has an ear infection and feels like in a cave.     Objective   Vital Signs:   /64 (BP Location: Left arm, Patient Position: Sitting, Cuff Size: Adult)   Pulse 79   Temp 96.6 °F (35.9 °C) (Temporal)   Resp 18   Ht 160 cm (63\")   Wt 71.1 kg (156 lb 12.8 oz)   SpO2 97%   BMI 27.78 kg/m²     Physical Exam  Vitals and nursing note reviewed.   Constitutional:       General: She is awake.      Appearance: Normal appearance. She is well-developed and well-groomed. She is obese.   HENT:      Head: " Normocephalic and atraumatic.      Jaw: There is normal jaw occlusion.      Right Ear: Hearing normal. Tympanic membrane is erythematous and bulging.      Left Ear: Hearing normal. Tympanic membrane is erythematous and bulging.      Nose: Nose normal.      Mouth/Throat:      Mouth: Mucous membranes are moist.      Pharynx: Oropharynx is clear.   Eyes:      General: Lids are normal. Vision grossly intact. Gaze aligned appropriately.      Extraocular Movements: Extraocular movements intact.      Conjunctiva/sclera: Conjunctivae normal.      Pupils: Pupils are equal, round, and reactive to light.   Neck:      Trachea: Trachea and phonation normal.   Cardiovascular:      Rate and Rhythm: Normal rate and regular rhythm.      Pulses: Normal pulses.      Heart sounds: Normal heart sounds.   Pulmonary:      Effort: Pulmonary effort is normal.      Breath sounds: Normal breath sounds.   Abdominal:      General: Abdomen is flat. Bowel sounds are normal.      Palpations: Abdomen is soft.   Genitourinary:     Rectum: Normal.   Musculoskeletal:         General: Normal range of motion.      Cervical back: Full passive range of motion without pain, normal range of motion and neck supple.   Lymphadenopathy:      Cervical: No cervical adenopathy.   Skin:     General: Skin is warm.      Capillary Refill: Capillary refill takes less than 2 seconds.      Findings: Lesion present.          Neurological:      General: No focal deficit present.      Mental Status: She is alert and oriented to person, place, and time.      Cranial Nerves: Cranial nerves are intact.      Sensory: Sensation is intact.      Motor: Motor function is intact.      Coordination: Coordination is intact.      Gait: Gait is intact.      Deep Tendon Reflexes: Reflexes are normal and symmetric.   Psychiatric:         Attention and Perception: Attention and perception normal.         Mood and Affect: Mood and affect normal.         Speech: Speech normal.          Behavior: Behavior normal.         Thought Content: Thought content normal.         Cognition and Memory: Cognition and memory normal.         Judgment: Judgment normal.        Result Review :   The following data was reviewed by: Edgar Lemus MD on 01/11/2022:  Common labs    Common Labsle 6/7/21 6/7/21 6/7/21 6/7/21 6/7/21 9/30/21 9/30/21 9/30/21    0820 0820 0820 0820 0820 0905 0905 0905   Glucose    307 (A)   360 (A)    BUN    16   35 (A)    Creatinine    0.88   1.18 (A)    eGFR Non  Am    65   46 (A)    Sodium    136   137    Potassium    4.1   3.6    Chloride    98   94 (A)    Calcium    9.3   9.8    Albumin    4.29   4.78    Total Bilirubin    0.3   0.5    Alkaline Phosphatase    126 (A)   118 (A)    AST (SGOT)    15   14    ALT (SGPT)    11   11    WBC 5.22          Hemoglobin 12.7          Hematocrit 39.4          Platelets 198          Total Cholesterol   176     166   Triglycerides   146     133   HDL Cholesterol   49     48   LDL Cholesterol    101 (A)     94   Hemoglobin A1C  9.40 (A)    12.50 (A)     Microalbumin, Urine     <1.2      (A) Abnormal value            Data reviewed: Radiologic studies 11/15/21 CT ABd Pelvis           Assessment and Plan    Diagnoses and all orders for this visit:    1. Mixed hyperlipidemia (Primary)  Assessment & Plan:  Lipid abnormalities are improving with treatment.  Pharmacotherapy as ordered.  Lipids will be reassessed in 3 months.    Orders:  -     Lipid Panel    2. Restless leg syndrome    3. Precordial pain    4. Gastroesophageal reflux disease without esophagitis    5. Benign hypertension  Assessment & Plan:  Hypertension is improving with treatment.  Continue current treatment regimen.  Dietary sodium restriction.  Weight loss.  Regular aerobic exercise.  Blood pressure will be reassessed at the next regular appointment.    Orders:  -     Comprehensive Metabolic Panel  -     Vitamin B12  -     CBC & Differential  -     Vitamin D 25 Hydroxy    6.  Palpitations  -     metoprolol succinate XL (TOPROL-XL) 25 MG 24 hr tablet; Take 1 tablet by mouth Daily With Dinner for 120 days.  Dispense: 30 tablet; Refill: 3    7. Diabetes mellitus without complication (HCC)  Assessment & Plan:  Diabetes is improving with treatment.   Dietary recommendations for ADA diet.  Regular aerobic exercise.  Discussed foot care.  Reminded to get yearly retinal exam.  Medication changes per orders.  Diabetes will be reassessed in 3 months.    Orders:  -     MicroAlbumin, Urine, Random - Urine, Clean Catch  -     Comprehensive Metabolic Panel  -     Hemoglobin A1c  -     Lipid Panel  -     Vitamin B12  -     CBC & Differential  -     Vitamin D 25 Hydroxy    8. Other specified hypothyroidism  -     TSH    9. Encounter for medication refill    10. Skin lesion of cheek  -     Ambulatory Referral to Dermatology    11. Acute maxillary sinusitis, recurrence not specified    12. Otalgia, unspecified laterality    Other orders  -     rosuvastatin (Crestor) 5 MG tablet; Take 1 tablet by mouth Daily With Dinner for 120 days.  Dispense: 30 tablet; Refill: 3  -     omeprazole (priLOSEC) 20 MG capsule; Take 1 capsule by mouth Daily for 120 days.  Dispense: 30 capsule; Refill: 3  -     metFORMIN (GLUCOPHAGE) 500 MG tablet; Take 1 tablet by mouth 3 (Three) Times a Day With Meals for 160 days.  Dispense: 120 tablet; Refill: 3  -     losartan-hydrochlorothiazide (HYZAAR) 100-25 MG per tablet; Take 1 tablet by mouth Daily With Breakfast for 120 days.  Dispense: 30 tablet; Refill: 3  -     Dulaglutide (Trulicity) 1.5 MG/0.5ML solution pen-injector; Inject 1.5 mg under the skin into the appropriate area as directed 1 (One) Time Per Week for 112 days.  Dispense: 2 mL; Refill: 3  -     amLODIPine (NORVASC) 10 MG tablet; Take 1 tablet by mouth Daily With Breakfast for 120 days.  Dispense: 30 tablet; Refill: 3  -     levothyroxine (Synthroid) 50 MCG tablet; Take 1 tablet by mouth Daily for 120 days. TAKE IN THE  MORNING WHEN YOU WAKE UP ON AN EMPTY STOMACH  Dispense: 30 tablet; Refill: 3  -     amoxicillin (AMOXIL) 875 MG tablet; Take 1 tablet by mouth 2 (Two) Times a Day for 10 days.  Dispense: 20 tablet; Refill: 0  -     meclizine (ANTIVERT) 25 MG tablet; Take 1 tablet by mouth 3 (Three) Times a Day As Needed for Dizziness for up to 7 days.  Dispense: 21 tablet; Refill: 0    I spent 25 minutes caring for Ernestina on this date of service. This time includes time spent by me in the following activities:reviewing tests, performing a medically appropriate examination and/or evaluation , counseling and educating the patient/family/caregiver, ordering medications, tests, or procedures and documenting information in the medical record     PLAN OF CARE:  1. INCREASED DOSE OF METFORMIN TO 500MG TID   2. REFERRED TO DERM FOR SUMA K LEFT CHEEK  3. RTC IN 4 MOS FOR HE ANNUAL PHYSICAL/ FASTING LABS     Follow Up   Return in about 5 months (around 5/30/2022) for Annual physical, Recheck, RTC FASTING LABS Med refill.  Patient was given instructions and counseling regarding her condition or for health maintenance advice. Please see specific information pulled into the AVS if appropriate.         This document has been electronically signed by Edgar Lemus MD  January 11, 2022 09:54 EST

## 2022-01-12 LAB
25(OH)D3 SERPL-MCNC: 35.5 NG/ML (ref 30–100)
ALBUMIN UR-MCNC: <1.2 MG/DL
VIT B12 BLD-MCNC: 1333 PG/ML (ref 211–946)

## 2022-01-13 ENCOUNTER — TELEPHONE (OUTPATIENT)
Dept: FAMILY MEDICINE CLINIC | Facility: CLINIC | Age: 62
End: 2022-01-13

## 2022-01-13 NOTE — TELEPHONE ENCOUNTER
Caller: Maryellen Wisdom    Relationship: Self    Best call back number: [phone]    Requested Prescriptions:   Requested Prescriptions     Pending Prescriptions Disp Refills   • losartan-hydrochlorothiazide (HYZAAR) 100-25 MG per tablet 30 tablet 3     Sig: Take 1 tablet by mouth Daily With Breakfast for 120 days.      TEST STRIPS AND LANCETS  CYCLOBENZAPRINE 10 MG    Pharmacy where request should be sent: [address] [phone] [facility] [phone] [facility]     Additional details provided by patient:  MARYELLEN HAS 1 WEEKS LEFT OF EACH    Does the patient have less than a 3 day supply:  [x] Yes  [] No    Jazmine Langley Rep   01/13/22 09:40 EST

## 2022-01-13 NOTE — TELEPHONE ENCOUNTER
Caller: Maryellen Wisdom    Relationship: Self    Best call back number: [phone]    Caller requesting test results: CAHTHY    What test was performed: LABS    When was the test performed:  01/11/22    Where was the test performed: OFFICE     Additional notes: MARYELLEN IS CALLING FOR THE RESULTS

## 2022-01-14 RX ORDER — LOSARTAN POTASSIUM AND HYDROCHLOROTHIAZIDE 25; 100 MG/1; MG/1
1 TABLET ORAL
Qty: 30 TABLET | Refills: 3 | Status: SHIPPED | OUTPATIENT
Start: 2022-01-14 | End: 2022-03-14 | Stop reason: SDUPTHER

## 2022-01-18 ENCOUNTER — TELEPHONE (OUTPATIENT)
Dept: FAMILY MEDICINE CLINIC | Facility: CLINIC | Age: 62
End: 2022-01-18

## 2022-01-18 NOTE — TELEPHONE ENCOUNTER
[facility] [place] [facility] [address] [phone] [facility] [phone]      Pharmacy representative name: [place]    Pharmacy representative phone number: [phone]    What medication are you calling in regards to: LOSARTAN HYDROCHLORIDE     What question does the pharmacy have: THE PHARMACY STATES THAT THE MEDICATION IS CURRENTLY ON BACK ORDER THEY WOULD LIKE TO KNOW IF THE DOCTOR WOULD LIKE TO DURGA IN SOMETHING ELSE     Who is the provider that prescribed the medication: DOCTOR CARLINE CHAPMAN

## 2022-01-20 ENCOUNTER — TELEPHONE (OUTPATIENT)
Dept: FAMILY MEDICINE CLINIC | Facility: CLINIC | Age: 62
End: 2022-01-20

## 2022-01-20 NOTE — TELEPHONE ENCOUNTER
Pharmacy Name:  [facility]    Pharmacy representative name: FERNANDO    Pharmacy representative phone number: [phone]     What medication are you calling in regards to: LOSARTAN HCTZ    What question does the pharmacy have: FERNANDO IS STATING THAT THE LOSARTAN HCTZ IS ON BACKORDER, SO THEY WANTED TO SEPARATE THE MEDICATIONS UNTIL THEY COULD GET THAT MEDICATION IN.  LOSARTAN 100 AND HYDROCHLOROTHIAZIDE 25 MG    Who is the provider that prescribed the medication: ADELA

## 2022-01-21 NOTE — TELEPHONE ENCOUNTER
PHARMACY CALLED BACK REGARDING PREVIOUS MESSAGE FROM YESTERDAY ABOUT PATIENT'S MEDICATION.     PLEASE ADVISE

## 2022-01-24 ENCOUNTER — TELEPHONE (OUTPATIENT)
Dept: FAMILY MEDICINE CLINIC | Facility: CLINIC | Age: 62
End: 2022-01-24

## 2022-01-24 RX ORDER — AMOXICILLIN AND CLAVULANATE POTASSIUM 875; 125 MG/1; MG/1
1 TABLET, FILM COATED ORAL 2 TIMES DAILY
Qty: 20 TABLET | Refills: 0 | Status: SHIPPED | OUTPATIENT
Start: 2022-01-24 | End: 2022-02-03

## 2022-01-24 NOTE — TELEPHONE ENCOUNTER
Patient called in and said that her right ear infection has not gotten better, she is wondering if you could call her in some more of different antibiotics to [facility].

## 2022-02-03 ENCOUNTER — TELEPHONE (OUTPATIENT)
Dept: FAMILY MEDICINE CLINIC | Facility: CLINIC | Age: 62
End: 2022-02-03

## 2022-02-03 RX ORDER — CYCLOBENZAPRINE HCL 10 MG
10 TABLET ORAL 3 TIMES DAILY PRN
Qty: 21 TABLET | Refills: 0 | Status: SHIPPED | OUTPATIENT
Start: 2022-02-03 | End: 2022-03-17 | Stop reason: SDUPTHER

## 2022-02-03 NOTE — TELEPHONE ENCOUNTER
Incoming Refill Request      Medication requested (name and dose): cyclobenzaprine (FLEXERIL) 10 MG tablet   Strips  Needles to test blood sugar      [facility] where [facility] be sent[facility]    Additional details provided by patient: is out of this medication    Best call back number: [phone]    Does the patient have less than a 3 day supply:  [x] Yes  [] No    Jazmine Beasley Rep  02/03/22, 09:33 EST

## 2022-02-07 RX ORDER — OMEPRAZOLE 20 MG/1
CAPSULE, DELAYED RELEASE ORAL
Qty: 30 CAPSULE | Refills: 3 | Status: SHIPPED | OUTPATIENT
Start: 2022-02-07 | End: 2022-05-27 | Stop reason: SDUPTHER

## 2022-02-07 RX ORDER — DULAGLUTIDE 1.5 MG/.5ML
INJECTION, SOLUTION SUBCUTANEOUS
Qty: 2 ML | Refills: 3 | Status: SHIPPED | OUTPATIENT
Start: 2022-02-07 | End: 2022-05-27 | Stop reason: SDUPTHER

## 2022-03-14 NOTE — TELEPHONE ENCOUNTER
Caller: Ernestina Wisdom    Relationship: Self    Best call back number: [phone]    Requested Prescriptions:   Requested Prescriptions     Pending Prescriptions Disp Refills   • losartan-hydrochlorothiazide (HYZAAR) 100-25 MG per tablet 30 tablet 3     Sig: Take 1 tablet by mouth Daily With Breakfast for 120 days.   • aspirin 81 MG EC tablet       Sig: Take 1 tablet by mouth Daily.      PATIENT NEEDS LANCETS AND STRIPS FOR STYLE FREE LITE.   CYCLOBENZAPRINE 10 MG 1 AT NIGHT.     Pharmacy where request should be sent: [address] [phone] [facility] [phone]      Additional details provided by patient: PATIENT OUT OF MEDICATION.  CALL IF PROBLEMS.      Does the patient have less than a 3 day supply:  [x] Yes  [] No    Shabana Knapp   03/14/22 11:26 EDT

## 2022-03-14 NOTE — TELEPHONE ENCOUNTER
Rx Refill Note  Requested Prescriptions     Pending Prescriptions Disp Refills   • losartan-hydrochlorothiazide (HYZAAR) 100-25 MG per tablet 30 tablet 3     Sig: Take 1 tablet by mouth Daily With Breakfast for 120 days.   • aspirin 81 MG EC tablet       Sig: Take 1 tablet by mouth Daily.      Please see above med refill requests and requests for lancets, test strips and Cyclobenzaprine (last ordered 2/3/22, 21 tabs)        Last office visit with prescribing clinician: 1/11/2022      Next office visit with prescribing clinician: 5/31/2022            Zo Ortega RN  03/14/22, 11:58 EDT

## 2022-03-15 RX ORDER — ASPIRIN 81 MG/1
81 TABLET ORAL
Qty: 30 TABLET | Refills: 5 | Status: SHIPPED | OUTPATIENT
Start: 2022-03-15 | End: 2022-05-27 | Stop reason: SDUPTHER

## 2022-03-15 RX ORDER — LOSARTAN POTASSIUM AND HYDROCHLOROTHIAZIDE 25; 100 MG/1; MG/1
1 TABLET ORAL
Qty: 30 TABLET | Refills: 3 | Status: SHIPPED | OUTPATIENT
Start: 2022-03-15 | End: 2022-05-27 | Stop reason: SDUPTHER

## 2022-03-17 NOTE — TELEPHONE ENCOUNTER
Rx Refill Note  Requested Prescriptions     Pending Prescriptions Disp Refills   • cyclobenzaprine (FLEXERIL) 10 MG tablet 21 tablet 0     Sig: Take 1 tablet by mouth 3 (Three) Times a Day As Needed for Muscle Spasms for up to 21 days.      Med last ordered on 2/3/22 (21 tabs).   Please see request for lancets and test strips also        Last office visit with prescribing clinician: 1/11/2022      Next office visit with prescribing clinician: 5/31/2022            Zo Ortega RN  03/17/22, 10:57 EDT

## 2022-03-17 NOTE — TELEPHONE ENCOUNTER
Incoming Refill Request      Medication requested (name and dose):   cyclobenzaprine (FLEXERIL) 10 MG tablet () 10 mg, Oral, 3 Times Daily PRN     NOT LISTED IN THE MEDICATIONS BUT NEEDS ~    LANCETS  TESTING STRIPS    FOR STYLE FREE LITE METER.    Pharmacy where request should be sent: [facility] IN [place]     Additional details provided by patient: PT IS COMPLETLEY OUT OF THIS MEDICATION AND TESTING SUPPLIES. HAS BEEN OUT FOR AT LEAST 2MONTHS.      Best call back number: [phone]    Does the patient have less than a 3 day supply:  [x] Yes  [] No    Jazmine Brewer Rep  22, 10:29 EDT

## 2022-03-21 RX ORDER — CYCLOBENZAPRINE HCL 10 MG
10 TABLET ORAL 3 TIMES DAILY PRN
Qty: 21 TABLET | Refills: 0 | Status: SHIPPED | OUTPATIENT
Start: 2022-03-21 | End: 2022-05-27 | Stop reason: SDUPTHER

## 2022-04-11 RX ORDER — AZITHROMYCIN 250 MG/1
TABLET, FILM COATED ORAL
Qty: 6 TABLET | Refills: 1 | Status: CANCELLED | OUTPATIENT
Start: 2022-04-11

## 2022-04-11 NOTE — TELEPHONE ENCOUNTER
Incoming Refill Request      Medication requested (name and dose):     Pharmacy where request should be sent: [facility] [place]    Additional details provided by patient: HAD A REFILL ON MEDICATION BUT PHARMACY TOLD HER TO CALL HERE     Best call back number: [phone]    Does the patient have less than a 3 day supply:  [x] Yes  [] No    Jazmine Brewer Rep  04/11/22, 14:23 EDT

## 2022-04-12 ENCOUNTER — OFFICE VISIT (OUTPATIENT)
Dept: FAMILY MEDICINE CLINIC | Facility: CLINIC | Age: 62
End: 2022-04-12

## 2022-04-12 ENCOUNTER — TELEPHONE (OUTPATIENT)
Dept: FAMILY MEDICINE CLINIC | Facility: CLINIC | Age: 62
End: 2022-04-12

## 2022-04-12 DIAGNOSIS — J01.01 ACUTE RECURRENT MAXILLARY SINUSITIS: ICD-10-CM

## 2022-04-12 DIAGNOSIS — R50.9 FEVER, UNSPECIFIED FEVER CAUSE: Primary | ICD-10-CM

## 2022-04-12 DIAGNOSIS — R09.81 NASAL SINUS CONGESTION: ICD-10-CM

## 2022-04-12 DIAGNOSIS — R06.02 SHORTNESS OF BREATH: ICD-10-CM

## 2022-04-12 DIAGNOSIS — R05.3 COUGH, PERSISTENT: ICD-10-CM

## 2022-04-12 PROCEDURE — 99213 OFFICE O/P EST LOW 20 MIN: CPT | Performed by: PSYCHOLOGIST

## 2022-04-12 RX ORDER — AZITHROMYCIN 250 MG/1
TABLET, FILM COATED ORAL
Qty: 6 TABLET | Refills: 1 | Status: SHIPPED | OUTPATIENT
Start: 2022-04-12 | End: 2022-05-27 | Stop reason: SDUPTHER

## 2022-04-12 RX ORDER — PREDNISONE 20 MG/1
20 TABLET ORAL 2 TIMES DAILY
Qty: 10 TABLET | Refills: 0 | Status: SHIPPED | OUTPATIENT
Start: 2022-04-12 | End: 2022-04-17

## 2022-04-12 RX ORDER — FLUTICASONE PROPIONATE 50 MCG
2 SPRAY, SUSPENSION (ML) NASAL DAILY
Qty: 18.2 ML | Refills: 0 | Status: SHIPPED | OUTPATIENT
Start: 2022-04-12 | End: 2022-05-27 | Stop reason: SDUPTHER

## 2022-04-12 RX ORDER — GUAIFENESIN AND CODEINE PHOSPHATE 100; 10 MG/5ML; MG/5ML
5 SOLUTION ORAL 3 TIMES DAILY PRN
Qty: 150 ML | Refills: 0 | Status: SHIPPED | OUTPATIENT
Start: 2022-04-12 | End: 2022-04-22

## 2022-04-12 NOTE — PROGRESS NOTES
Subjective   Ernestina Wisdom is a 61 y.o. female C/O TELEPHONE VISIT HER MY CHART STILL NOT SET UP FOR A TELEHEATLH VIDEO VISIT.     Back Pain  This is a new problem. The current episode started in the past 7 days. The problem occurs constantly. The problem has been gradually worsening since onset. The pain is present in the thoracic spine. The quality of the pain is described as burning. The pain is at a severity of 7/10. The pain is moderate. The pain is the same all the time. Stiffness is present all day. Associated symptoms include a fever (LGF ) and leg pain. Pertinent negatives include no chest pain, dysuria, numbness or tingling. She has tried bed rest and analgesics for the symptoms. The treatment provided mild relief.      SHE REPORTS THE PAIN IS IN HER BACK WHERE THE LUNGS ARE AND MAYBE KIDNEYS. HER SINUSES ARE BAD TOO AND HE EYES ARE SWOLLEN. NO DYSURIA  OR HEMATURIA. HER EARS ARE HURTING TOO/ SORE THROAT/NASAL CONGESTION AND  JUST FEELS BAD ANS GETTING WORSE TODAY.  SHE GETS SOA AT TIMES AND COUGH NON PRODUCTIVE.    The following portions of the patient's history were reviewed and updated as appropriate: allergies, current medications, past family history, past medical history, past social history, past surgical history and problem list.    Review of Systems   Constitutional: Positive for fever (LGF ).   Cardiovascular: Negative for chest pain.   Genitourinary: Negative for dysuria.   Musculoskeletal: Positive for back pain.   Neurological: Negative for tingling and numbness.     See history of Present Illness     Objective     Virtual Visit Physical Exam    PHQ-2/PHQ-9 Depression Screening 10/1/2021   Retired PHQ-9 Total Score 0   Retired Total Score 0       Patient's There is no height or weight on file to calculate BMI. indicating that she is could not have BMI calculated because patient was not weighed for this visit .   (Normal BMI:  18.5-24.9, OW 25-29.9, Obesity 30 or greater)      Assessment/Plan      Problems Addressed this Visit    None     Visit Diagnoses     Fever, unspecified fever cause    -  Primary    Cough, persistent        Relevant Medications    guaiFENesin-codeine (GUAIFENESIN AC) 100-10 MG/5ML liquid    Shortness of breath        Nasal sinus congestion        Acute recurrent maxillary sinusitis          Diagnoses       Codes Comments    Fever, unspecified fever cause    -  Primary ICD-10-CM: R50.9  ICD-9-CM: 780.60     Cough, persistent     ICD-10-CM: R05.3  ICD-9-CM: 786.2     Shortness of breath     ICD-10-CM: R06.02  ICD-9-CM: 786.05     Nasal sinus congestion     ICD-10-CM: R09.81  ICD-9-CM: 478.19     Acute recurrent maxillary sinusitis     ICD-10-CM: J01.01  ICD-9-CM: 461.0         PLAN OF CARE:  #1 the patient was advised if symptoms worsen to please return to the clinic to work-up for chest x-ray and labs.  She is status post Covid positive patient x2 already in the past.   If it is the weekend to proceed to the emergency room.    You have chosen to receive care through a telephone visit. Do you consent to use a telephone visit for your medical care today? Yes    This visit has been rescheduled as a phone visit to comply with patient safety concerns in accordance with CDC recommendations. Total time of discussion was 15 minutes.          This document has been electronically signed by Edgar Lemus MD  April 12, 2022 15:15 EDT    Part of this note may be an electronic transcription/translation of spoken language to printed text using the Dragon Dictation System.

## 2022-04-28 RX ORDER — FUROSEMIDE 20 MG/1
20 TABLET ORAL DAILY
Qty: 30 TABLET | Refills: 0 | Status: SHIPPED | OUTPATIENT
Start: 2022-04-28 | End: 2022-05-27

## 2022-04-28 NOTE — TELEPHONE ENCOUNTER
Rx Refill Note  Requested Prescriptions     Pending Prescriptions Disp Refills   • furosemide (Lasix) 20 MG tablet 30 tablet 0     Sig: Take 1 tablet by mouth Daily for 30 days.      Please see above med refill request. Last order was 6/11/21.          Last office visit with prescribing clinician: 4/12/2022      Next office visit with prescribing clinician: 5/31/2022            Zo Ortega RN  04/28/22, 16:53 EDT

## 2022-04-28 NOTE — TELEPHONE ENCOUNTER
Incoming Refill Request      Medication requested (name and dose):           Pharmacy where request should be sent: [facility] IN [place]     Additional details provided by patient: COMPLETELY OUT OF THIS MEDICINE, AND LEGS AND FEET SWELLING    Best call back number: [phone]    Does the patient have less than a 3 day supply:  [x] Yes  [] No    Jazmine Brewer Rep  04/28/22, 15:34 EDT

## 2022-05-27 DIAGNOSIS — R00.2 PALPITATIONS: ICD-10-CM

## 2022-05-27 DIAGNOSIS — R07.2 PRECORDIAL PAIN: ICD-10-CM

## 2022-05-27 DIAGNOSIS — I10 ESSENTIAL HYPERTENSION: ICD-10-CM

## 2022-05-27 RX ORDER — OMEPRAZOLE 20 MG/1
20 CAPSULE, DELAYED RELEASE ORAL DAILY
Qty: 30 CAPSULE | Refills: 3 | Status: SHIPPED | OUTPATIENT
Start: 2022-05-27 | End: 2022-07-13 | Stop reason: SDUPTHER

## 2022-05-27 RX ORDER — METOPROLOL SUCCINATE 25 MG/1
25 TABLET, EXTENDED RELEASE ORAL
Qty: 30 TABLET | Refills: 3 | Status: SHIPPED | OUTPATIENT
Start: 2022-05-27 | End: 2022-07-13 | Stop reason: SDUPTHER

## 2022-05-27 RX ORDER — AZITHROMYCIN 250 MG/1
TABLET, FILM COATED ORAL
Qty: 6 TABLET | Refills: 1 | Status: SHIPPED | OUTPATIENT
Start: 2022-05-27 | End: 2022-07-13

## 2022-05-27 RX ORDER — ROSUVASTATIN CALCIUM 5 MG/1
5 TABLET, COATED ORAL
Qty: 30 TABLET | Refills: 3 | Status: SHIPPED | OUTPATIENT
Start: 2022-05-27 | End: 2022-07-13 | Stop reason: SDUPTHER

## 2022-05-27 RX ORDER — NITROGLYCERIN 0.4 MG/1
TABLET SUBLINGUAL
Qty: 30 TABLET | Refills: 5 | Status: SHIPPED | OUTPATIENT
Start: 2022-05-27

## 2022-05-27 RX ORDER — LEVOTHYROXINE SODIUM 0.05 MG/1
50 TABLET ORAL DAILY
Qty: 30 TABLET | Refills: 3 | Status: SHIPPED | OUTPATIENT
Start: 2022-05-27 | End: 2022-12-05 | Stop reason: SDUPTHER

## 2022-05-27 RX ORDER — FLUTICASONE PROPIONATE 50 MCG
2 SPRAY, SUSPENSION (ML) NASAL DAILY
Qty: 18.2 ML | Refills: 0 | Status: SHIPPED | OUTPATIENT
Start: 2022-05-27 | End: 2022-07-13 | Stop reason: SDUPTHER

## 2022-05-27 RX ORDER — FUROSEMIDE 20 MG/1
20 TABLET ORAL DAILY
Qty: 30 TABLET | Refills: 0 | Status: SHIPPED | OUTPATIENT
Start: 2022-05-27 | End: 2022-07-13 | Stop reason: SDUPTHER

## 2022-05-27 RX ORDER — FUROSEMIDE 20 MG/1
TABLET ORAL
Qty: 30 TABLET | Refills: 0 | Status: SHIPPED | OUTPATIENT
Start: 2022-05-27 | End: 2022-05-27 | Stop reason: SDUPTHER

## 2022-05-27 RX ORDER — RANOLAZINE 500 MG/1
500 TABLET, EXTENDED RELEASE ORAL 2 TIMES DAILY
Qty: 60 TABLET | Refills: 11 | Status: SHIPPED | OUTPATIENT
Start: 2022-05-27 | End: 2022-07-13

## 2022-05-27 RX ORDER — LOSARTAN POTASSIUM AND HYDROCHLOROTHIAZIDE 25; 100 MG/1; MG/1
1 TABLET ORAL
Qty: 30 TABLET | Refills: 3 | Status: SHIPPED | OUTPATIENT
Start: 2022-05-27 | End: 2022-07-13 | Stop reason: SDUPTHER

## 2022-05-27 RX ORDER — DULAGLUTIDE 1.5 MG/.5ML
1.5 INJECTION, SOLUTION SUBCUTANEOUS WEEKLY
Qty: 2 ML | Refills: 3 | Status: SHIPPED | OUTPATIENT
Start: 2022-05-27 | End: 2022-05-31 | Stop reason: SDUPTHER

## 2022-05-27 RX ORDER — CYCLOBENZAPRINE HCL 10 MG
10 TABLET ORAL 3 TIMES DAILY PRN
Qty: 21 TABLET | Refills: 0 | Status: SHIPPED | OUTPATIENT
Start: 2022-05-27 | End: 2022-06-17

## 2022-05-27 RX ORDER — ASPIRIN 81 MG/1
81 TABLET ORAL
Qty: 30 TABLET | Refills: 5 | Status: SHIPPED | OUTPATIENT
Start: 2022-05-27 | End: 2022-09-07

## 2022-05-31 RX ORDER — DULAGLUTIDE 1.5 MG/.5ML
1.5 INJECTION, SOLUTION SUBCUTANEOUS WEEKLY
Qty: 2 ML | Refills: 3 | Status: SHIPPED | OUTPATIENT
Start: 2022-05-31 | End: 2022-07-13

## 2022-05-31 NOTE — TELEPHONE ENCOUNTER
Incoming Refill Request      Medication requested (name and dose):   Dulaglutide (Trulicity) 1.5 MG/0.5ML solution pen-injector 1.5 mg, Subcutaneous, Weekly         Pharmacy where request should be sent: [facility] [place]    Additional details provided by patient:     Best call back number: [phone]    Does the patient have less than a 3 day supply:  [] Yes  [] No    Jazmine Brewer Rep  05/31/22, 11:10 EDT

## 2022-06-01 RX ORDER — AMLODIPINE BESYLATE 10 MG/1
TABLET ORAL
COMMUNITY
Start: 2022-05-23 | End: 2022-06-08

## 2022-06-08 RX ORDER — AMLODIPINE BESYLATE 10 MG/1
TABLET ORAL
Qty: 30 TABLET | Refills: 10 | Status: SHIPPED | OUTPATIENT
Start: 2022-06-08 | End: 2022-07-28

## 2022-07-13 ENCOUNTER — OFFICE VISIT (OUTPATIENT)
Dept: FAMILY MEDICINE CLINIC | Facility: CLINIC | Age: 62
End: 2022-07-13

## 2022-07-13 VITALS
DIASTOLIC BLOOD PRESSURE: 71 MMHG | OXYGEN SATURATION: 98 % | TEMPERATURE: 97.7 F | BODY MASS INDEX: 28.7 KG/M2 | HEIGHT: 63 IN | HEART RATE: 60 BPM | WEIGHT: 162 LBS | RESPIRATION RATE: 18 BRPM | SYSTOLIC BLOOD PRESSURE: 132 MMHG

## 2022-07-13 DIAGNOSIS — Z76.0 ENCOUNTER FOR MEDICATION REFILL: ICD-10-CM

## 2022-07-13 DIAGNOSIS — Z00.00 ENCOUNTER FOR ANNUAL PHYSICAL EXAM: Primary | ICD-10-CM

## 2022-07-13 DIAGNOSIS — R00.2 PALPITATIONS: ICD-10-CM

## 2022-07-13 DIAGNOSIS — E11.9 DIABETES MELLITUS WITHOUT COMPLICATION: ICD-10-CM

## 2022-07-13 DIAGNOSIS — I10 BENIGN HYPERTENSION: ICD-10-CM

## 2022-07-13 DIAGNOSIS — E78.2 MIXED HYPERLIPIDEMIA: ICD-10-CM

## 2022-07-13 DIAGNOSIS — K21.9 GASTROESOPHAGEAL REFLUX DISEASE WITHOUT ESOPHAGITIS: ICD-10-CM

## 2022-07-13 DIAGNOSIS — E03.8 OTHER SPECIFIED HYPOTHYROIDISM: ICD-10-CM

## 2022-07-13 LAB
ALBUMIN SERPL-MCNC: 4.52 G/DL (ref 3.5–5.2)
ALBUMIN/GLOB SERPL: 1.6 G/DL
ALP SERPL-CCNC: 108 U/L (ref 39–117)
ALT SERPL W P-5'-P-CCNC: 14 U/L (ref 1–33)
ANION GAP SERPL CALCULATED.3IONS-SCNC: 12.3 MMOL/L (ref 5–15)
AST SERPL-CCNC: 16 U/L (ref 1–32)
BASOPHILS # BLD AUTO: 0.06 10*3/MM3 (ref 0–0.2)
BASOPHILS NFR BLD AUTO: 1 % (ref 0–1.5)
BILIRUB BLD-MCNC: NEGATIVE MG/DL
BILIRUB SERPL-MCNC: 0.3 MG/DL (ref 0–1.2)
BUN SERPL-MCNC: 24 MG/DL (ref 8–23)
BUN/CREAT SERPL: 26.7 (ref 7–25)
CALCIUM SPEC-SCNC: 9.8 MG/DL (ref 8.6–10.5)
CHLORIDE SERPL-SCNC: 100 MMOL/L (ref 98–107)
CHOLEST SERPL-MCNC: 187 MG/DL (ref 0–200)
CLARITY, POC: CLEAR
CO2 SERPL-SCNC: 26.7 MMOL/L (ref 22–29)
COLOR UR: YELLOW
CREAT SERPL-MCNC: 0.9 MG/DL (ref 0.57–1)
DEPRECATED RDW RBC AUTO: 40.4 FL (ref 37–54)
EGFRCR SERPLBLD CKD-EPI 2021: 72 ML/MIN/1.73
EOSINOPHIL # BLD AUTO: 0.06 10*3/MM3 (ref 0–0.4)
EOSINOPHIL NFR BLD AUTO: 1 % (ref 0.3–6.2)
ERYTHROCYTE [DISTWIDTH] IN BLOOD BY AUTOMATED COUNT: 12.8 % (ref 12.3–15.4)
GLOBULIN UR ELPH-MCNC: 2.9 GM/DL
GLUCOSE SERPL-MCNC: 292 MG/DL (ref 65–99)
GLUCOSE UR STRIP-MCNC: ABNORMAL MG/DL
HBA1C MFR BLD: 12 % (ref 4.8–5.6)
HCT VFR BLD AUTO: 39.1 % (ref 34–46.6)
HDLC SERPL-MCNC: 57 MG/DL (ref 40–60)
HGB BLD-MCNC: 12.7 G/DL (ref 12–15.9)
HYPOCHROMIA BLD QL: NORMAL
IMM GRANULOCYTES # BLD AUTO: 0.01 10*3/MM3 (ref 0–0.05)
IMM GRANULOCYTES NFR BLD AUTO: 0.2 % (ref 0–0.5)
KETONES UR QL: NEGATIVE
LARGE PLATELETS: NORMAL
LDLC SERPL CALC-MCNC: 104 MG/DL (ref 0–100)
LDLC/HDLC SERPL: 1.76 {RATIO}
LEUKOCYTE EST, POC: NEGATIVE
LYMPHOCYTES # BLD AUTO: 2.73 10*3/MM3 (ref 0.7–3.1)
LYMPHOCYTES NFR BLD AUTO: 45.2 % (ref 19.6–45.3)
MCH RBC QN AUTO: 28.2 PG (ref 26.6–33)
MCHC RBC AUTO-ENTMCNC: 32.5 G/DL (ref 31.5–35.7)
MCV RBC AUTO: 86.7 FL (ref 79–97)
MONOCYTES # BLD AUTO: 0.41 10*3/MM3 (ref 0.1–0.9)
MONOCYTES NFR BLD AUTO: 6.8 % (ref 5–12)
NEUTROPHILS NFR BLD AUTO: 2.77 10*3/MM3 (ref 1.7–7)
NEUTROPHILS NFR BLD AUTO: 45.8 % (ref 42.7–76)
NITRITE UR-MCNC: NEGATIVE MG/ML
NRBC BLD AUTO-RTO: 0 /100 WBC (ref 0–0.2)
PH UR: 6 [PH] (ref 5–8)
PLATELET # BLD AUTO: 172 10*3/MM3 (ref 140–450)
PMV BLD AUTO: 11.5 FL (ref 6–12)
POTASSIUM SERPL-SCNC: 4.3 MMOL/L (ref 3.5–5.2)
PROT SERPL-MCNC: 7.4 G/DL (ref 6–8.5)
PROT UR STRIP-MCNC: NEGATIVE MG/DL
RBC # BLD AUTO: 4.51 10*6/MM3 (ref 3.77–5.28)
RBC # UR STRIP: NEGATIVE /UL
SODIUM SERPL-SCNC: 139 MMOL/L (ref 136–145)
SP GR UR: 1.02 (ref 1–1.03)
TRIGL SERPL-MCNC: 149 MG/DL (ref 0–150)
TSH SERPL DL<=0.05 MIU/L-ACNC: 4.22 UIU/ML (ref 0.27–4.2)
UROBILINOGEN UR QL: NORMAL
VLDLC SERPL-MCNC: 26 MG/DL (ref 5–40)
WBC NRBC COR # BLD: 6.04 10*3/MM3 (ref 3.4–10.8)

## 2022-07-13 PROCEDURE — 81002 URINALYSIS NONAUTO W/O SCOPE: CPT | Performed by: PSYCHOLOGIST

## 2022-07-13 PROCEDURE — 80061 LIPID PANEL: CPT | Performed by: PSYCHOLOGIST

## 2022-07-13 PROCEDURE — 85007 BL SMEAR W/DIFF WBC COUNT: CPT | Performed by: PSYCHOLOGIST

## 2022-07-13 PROCEDURE — 82043 UR ALBUMIN QUANTITATIVE: CPT | Performed by: PSYCHOLOGIST

## 2022-07-13 PROCEDURE — 80050 GENERAL HEALTH PANEL: CPT | Performed by: PSYCHOLOGIST

## 2022-07-13 PROCEDURE — 3008F BODY MASS INDEX DOCD: CPT | Performed by: PSYCHOLOGIST

## 2022-07-13 PROCEDURE — 83036 HEMOGLOBIN GLYCOSYLATED A1C: CPT | Performed by: PSYCHOLOGIST

## 2022-07-13 PROCEDURE — 99396 PREV VISIT EST AGE 40-64: CPT | Performed by: PSYCHOLOGIST

## 2022-07-13 PROCEDURE — 82607 VITAMIN B-12: CPT | Performed by: PSYCHOLOGIST

## 2022-07-13 PROCEDURE — 82306 VITAMIN D 25 HYDROXY: CPT | Performed by: PSYCHOLOGIST

## 2022-07-13 RX ORDER — LOSARTAN POTASSIUM AND HYDROCHLOROTHIAZIDE 25; 100 MG/1; MG/1
1 TABLET ORAL
Qty: 30 TABLET | Refills: 3 | Status: SHIPPED | OUTPATIENT
Start: 2022-07-13 | End: 2022-12-05 | Stop reason: SDUPTHER

## 2022-07-13 RX ORDER — ROSUVASTATIN CALCIUM 5 MG/1
5 TABLET, COATED ORAL
Qty: 30 TABLET | Refills: 3 | Status: SHIPPED | OUTPATIENT
Start: 2022-07-13 | End: 2022-12-05 | Stop reason: SDUPTHER

## 2022-07-13 RX ORDER — CYCLOBENZAPRINE HCL 10 MG
10 TABLET ORAL 3 TIMES DAILY PRN
Qty: 30 TABLET | Refills: 0 | Status: SHIPPED | OUTPATIENT
Start: 2022-07-13 | End: 2022-07-18

## 2022-07-13 RX ORDER — FUROSEMIDE 20 MG/1
20 TABLET ORAL DAILY
Qty: 30 TABLET | Refills: 0 | Status: SHIPPED | OUTPATIENT
Start: 2022-07-13 | End: 2022-07-18

## 2022-07-13 RX ORDER — FLUTICASONE PROPIONATE 50 MCG
2 SPRAY, SUSPENSION (ML) NASAL DAILY
Qty: 18.2 ML | Refills: 2 | Status: SHIPPED | OUTPATIENT
Start: 2022-07-13 | End: 2022-09-07

## 2022-07-13 RX ORDER — OMEPRAZOLE 20 MG/1
20 CAPSULE, DELAYED RELEASE ORAL DAILY
Qty: 30 CAPSULE | Refills: 3 | Status: SHIPPED | OUTPATIENT
Start: 2022-07-13 | End: 2022-12-05 | Stop reason: SDUPTHER

## 2022-07-13 RX ORDER — METOPROLOL SUCCINATE 25 MG/1
25 TABLET, EXTENDED RELEASE ORAL
Qty: 30 TABLET | Refills: 3 | Status: SHIPPED | OUTPATIENT
Start: 2022-07-13 | End: 2022-12-05 | Stop reason: SDUPTHER

## 2022-07-13 NOTE — PROGRESS NOTES
Chief Complaint  Annual Exam (Annual Physical) and Med Refill (On all meds sent to [facility] care/)    Subjective        Ernestina Wisdom presents to [facility]IMARY [facility]   C/o annual physical 2. Chronic illness 3. Med refill  Hypertension  This is a chronic problem. The current episode started more than 1 year ago. The problem has been resolved since onset. The problem is controlled. Pertinent negatives include no chest pain, headaches, palpitations or shortness of breath. Agents associated with hypertension include thyroid hormones. Risk factors for coronary artery disease include obesity, post-menopausal state, diabetes mellitus and dyslipidemia. Past treatments include lifestyle changes, diuretics, beta blockers and angiotensin blockers. Current antihypertension treatment includes lifestyle changes, diuretics, beta blockers and angiotensin blockers. The current treatment provides significant improvement. Compliance problems include diet.  There is no history of angina, kidney disease or CAD/MI. Identifiable causes of hypertension include a thyroid problem. There is no history of chronic renal disease.   Hyperlipidemia  This is a chronic problem. The current episode started more than 1 year ago. The problem is controlled. Recent lipid tests were reviewed and are normal. Exacerbating diseases include hypothyroidism. She has no history of chronic renal disease. Pertinent negatives include no chest pain or shortness of breath. Current antihyperlipidemic treatment includes statins. The current treatment provides significant improvement of lipids. There are no compliance problems.  Risk factors for coronary artery disease include diabetes mellitus, hypertension and obesity.   Diabetes  She presents for her follow-up diabetic visit. She has type 2 diabetes mellitus. No MedicAlert identification noted. The initial diagnosis of diabetes was made 25 years ago. Her disease course has been fluctuating. There  "are no hypoglycemic associated symptoms. Pertinent negatives for hypoglycemia include no headaches. There are no diabetic associated symptoms. Pertinent negatives for diabetes include no chest pain. There are no hypoglycemic complications. There are no diabetic complications. Risk factors for coronary artery disease include obesity, hypertension and dyslipidemia. Current diabetic treatment includes diet and oral agent (monotherapy). She is compliant with treatment all of the time. She is following a generally healthy diet. When asked about meal planning, she reported none. She has not had a previous visit with a dietitian. She participates in exercise daily. An ACE inhibitor/angiotensin II receptor blocker is being taken. She does not see a podiatrist.Eye exam is current.   Heartburn  She reports no chest pain, no early satiety or no nausea. This is a chronic problem. The current episode started more than 1 year ago. The problem occurs rarely. The problem has been resolved. She has tried a PPI for the symptoms. The treatment provided significant relief.       Objective   Vital Signs:  /71   Pulse 60   Temp 97.7 °F (36.5 °C) (Temporal)   Resp 18   Ht 160 cm (63\")   Wt 73.5 kg (162 lb)   SpO2 98%   BMI 28.70 kg/m²   Estimated body mass index is 28.7 kg/m² as calculated from the following:    Height as of this encounter: 160 cm (63\").    Weight as of this encounter: 73.5 kg (162 lb).    BMI is >= 25 and <30. (Overweight) The following options were offered after discussion;: exercise counseling/recommendations and nutrition counseling/recommendations      Physical Exam  Vitals and nursing note reviewed.   Constitutional:       General: She is awake.      Appearance: Normal appearance. She is well-developed and well-groomed. She is obese.   HENT:      Head: Normocephalic and atraumatic.      Jaw: There is normal jaw occlusion.      Nose: Nose normal.      Mouth/Throat:      Mouth: Mucous membranes are moist. "      Pharynx: Oropharynx is clear.   Eyes:      General: Lids are normal. Vision grossly intact. Gaze aligned appropriately.      Extraocular Movements: Extraocular movements intact.      Conjunctiva/sclera: Conjunctivae normal.      Pupils: Pupils are equal, round, and reactive to light.   Neck:      Trachea: Trachea and phonation normal.   Cardiovascular:      Rate and Rhythm: Normal rate and regular rhythm.      Pulses: Normal pulses.      Heart sounds: Normal heart sounds.   Pulmonary:      Effort: Pulmonary effort is normal.      Breath sounds: Normal breath sounds.   Abdominal:      General: Abdomen is protuberant. Bowel sounds are normal.      Palpations: Abdomen is soft.   Genitourinary:     Rectum: Normal.   Musculoskeletal:         General: Normal range of motion.      Cervical back: Full passive range of motion without pain, normal range of motion and neck supple.   Lymphadenopathy:      Cervical: No cervical adenopathy.   Skin:     General: Skin is warm.      Capillary Refill: Capillary refill takes less than 2 seconds.   Neurological:      General: No focal deficit present.      Mental Status: She is alert and oriented to person, place, and time.      Cranial Nerves: Cranial nerves are intact.      Sensory: Sensation is intact.      Motor: Motor function is intact.      Coordination: Coordination is intact.      Gait: Gait is intact.      Deep Tendon Reflexes: Reflexes are normal and symmetric.   Psychiatric:         Attention and Perception: Attention and perception normal.         Mood and Affect: Mood and affect normal.         Speech: Speech normal.         Behavior: Behavior normal.         Thought Content: Thought content normal.         Cognition and Memory: Cognition and memory normal.         Judgment: Judgment normal.        Result Review :  The following data was reviewed by: Edgar Lemus MD on 07/13/2022:  Common labs    Common Labsle 9/30/21 9/30/21 9/30/21 1/11/22 1/11/22 1/11/22  1/11/22 1/11/22    0905 0905 0905 1005 1005 1005 1005 1005   Glucose  360 (A)     143 (A)    BUN  35 (A)     20    Creatinine  1.18 (A)     0.77    eGFR Non  Am  46 (A)     76    Sodium  137     139    Potassium  3.6     4.3    Chloride  94 (A)     101    Calcium  9.8     9.5    Albumin  4.78     4.29    Total Bilirubin  0.5     0.3    Alkaline Phosphatase  118 (A)     103    AST (SGOT)  14     19    ALT (SGPT)  11     12    WBC    8.14       Hemoglobin    13.1       Hematocrit    40.2       Platelets    284       Total Cholesterol   166   203 (A)     Triglycerides   133   141     HDL Cholesterol   48   55     LDL Cholesterol    94   123 (A)     Hemoglobin A1C 12.50 (A)    8.70 (A)      Microalbumin, Urine        <1.2   (A) Abnormal value            Data reviewed: Radiologic studies 10/1/2021 [facility] [place]          Assessment and Plan   Diagnoses and all orders for this visit:    1. Encounter for annual physical exam (Primary)    2. Mixed hyperlipidemia  Assessment & Plan:  Lipid abnormalities are improving with treatment.  Nutritional counseling was provided. and Pharmacotherapy as ordered.  Lipids will be reassessed in 3 months.      3. Benign hypertension  Assessment & Plan:  Hypertension is improving with treatment.  Continue current treatment regimen.  Dietary sodium restriction.  Weight loss.  Regular aerobic exercise.  Blood pressure will be reassessed in 3 months.      4. Other specified hypothyroidism    5. Diabetes mellitus without complication (HCC)  Assessment & Plan:  Diabetes is improving with treatment.   Dietary recommendations for ADA diet.  Regular aerobic exercise.  Discussed foot care.  Reminded to get yearly retinal exam.  Diabetes will be reassessed in 3 months.    Orders:  -     Hemoglobin A1c; Future    6. Palpitations    7. Gastroesophageal reflux disease without esophagitis    8. Encounter for medication refill         I spent 23 minutes caring for Ernestina on this date of service. This time  includes time spent by me in the following activities:reviewing tests, performing a medically appropriate examination and/or evaluation , counseling and educating the patient/family/caregiver, ordering medications, tests, or procedures and documenting information in the medical record     Follow Up   Return in about 2 weeks (around 7/27/2022) for Recheck --change in med for BP & DM.  Patient was given instructions and counseling regarding her condition or for health maintenance advice. Please see specific information pulled into the AVS if appropriate.         This document has been electronically signed by Edgar Lemus MD  July 13, 2022 09:45 EDT

## 2022-07-13 NOTE — ASSESSMENT & PLAN NOTE
Diabetes is improving with treatment.   Dietary recommendations for ADA diet.  Regular aerobic exercise.  Discussed foot care.  Reminded to get yearly retinal exam.  Diabetes will be reassessed in 3 months.

## 2022-07-14 LAB
25(OH)D3 SERPL-MCNC: 27.5 NG/ML (ref 30–100)
ALBUMIN UR-MCNC: <1.2 MG/DL
VIT B12 BLD-MCNC: 523 PG/ML (ref 211–946)

## 2022-07-15 RX ORDER — ORAL SEMAGLUTIDE 7 MG/1
7 TABLET ORAL DAILY
Qty: 30 TABLET | Refills: 2 | Status: SHIPPED | OUTPATIENT
Start: 2022-07-15 | End: 2022-10-13

## 2022-07-15 NOTE — PROGRESS NOTES
Call patient regarding lab results and advised her that we do need to adjust her dose of medication for diabetes.  We will add Rybelsus 7 mg at lunchtime to take it 30 minutes before she eats.  And she will need to recheck labs again in 3 months.  If she has any problems with the medication to please give us a call back at the clinic patient understands.

## 2022-07-18 RX ORDER — FUROSEMIDE 20 MG/1
20 TABLET ORAL DAILY
Qty: 30 TABLET | Refills: 10 | Status: SHIPPED | OUTPATIENT
Start: 2022-07-18 | End: 2023-01-05 | Stop reason: SDUPTHER

## 2022-07-18 RX ORDER — CYCLOBENZAPRINE HCL 10 MG
TABLET ORAL
Qty: 30 TABLET | Refills: 10 | Status: SHIPPED | OUTPATIENT
Start: 2022-07-18 | End: 2023-01-05 | Stop reason: SDUPTHER

## 2022-07-28 ENCOUNTER — OFFICE VISIT (OUTPATIENT)
Dept: FAMILY MEDICINE CLINIC | Facility: CLINIC | Age: 62
End: 2022-07-28

## 2022-07-28 VITALS
HEIGHT: 63 IN | OXYGEN SATURATION: 99 % | DIASTOLIC BLOOD PRESSURE: 73 MMHG | BODY MASS INDEX: 28.17 KG/M2 | HEART RATE: 70 BPM | SYSTOLIC BLOOD PRESSURE: 132 MMHG | WEIGHT: 159 LBS | TEMPERATURE: 97.6 F | RESPIRATION RATE: 18 BRPM

## 2022-07-28 DIAGNOSIS — E55.9 VITAMIN D DEFICIENCY: ICD-10-CM

## 2022-07-28 DIAGNOSIS — I10 BENIGN HYPERTENSION: ICD-10-CM

## 2022-07-28 DIAGNOSIS — Z12.11 SCREENING FOR COLON CANCER: ICD-10-CM

## 2022-07-28 DIAGNOSIS — E11.9 DIABETES MELLITUS WITHOUT COMPLICATION: ICD-10-CM

## 2022-07-28 DIAGNOSIS — E78.2 MIXED HYPERLIPIDEMIA: Primary | ICD-10-CM

## 2022-07-28 PROCEDURE — 99213 OFFICE O/P EST LOW 20 MIN: CPT | Performed by: PSYCHOLOGIST

## 2022-07-28 NOTE — PROGRESS NOTES
Chief Complaint  Follow-up (Hypertension - Medication Changed. Diabetes - Medication Changed. Re-evaluate. )    Subjective        Ernestina Wisdom presents to [facility]   C/o follow up chronic illness 2. MED REFILL:   Hypertension  This is a chronic problem. The current episode started more than 1 year ago. The problem has been resolved since onset. The problem is controlled. Pertinent negatives include no anxiety, blurred vision, chest pain, headaches, palpitations or shortness of breath. Risk factors for coronary artery disease include obesity, post-menopausal state, diabetes mellitus and dyslipidemia. Past treatments include lifestyle changes, diuretics, angiotensin blockers and beta blockers. Current antihypertension treatment includes lifestyle changes, diuretics, angiotensin blockers and beta blockers. The current treatment provides significant improvement. There are no compliance problems.  There is no history of angina, kidney disease or CAD/MI. Identifiable causes of hypertension include a thyroid problem. There is no history of chronic renal disease.   Diabetes  She presents for her follow-up diabetic visit. She has type 2 diabetes mellitus. No MedicAlert identification noted. Her disease course has been improving. There are no hypoglycemic associated symptoms. Pertinent negatives for hypoglycemia include no headaches. Pertinent negatives for diabetes include no blurred vision, no chest pain, no foot paresthesias, no foot ulcerations and no visual change. There are no hypoglycemic complications. There are no diabetic complications. Risk factors for coronary artery disease include obesity, hypertension and dyslipidemia. Current diabetic treatment includes oral agent (monotherapy) and diet. Her weight is decreasing steadily. She is following a generally healthy diet. When asked about meal planning, she reported none. She has not had a previous visit with a dietitian. She  "participates in exercise intermittently. An ACE inhibitor/angiotensin II receptor blocker is being taken. She does not see a podiatrist.Eye exam is current.   Hyperlipidemia  This is a chronic problem. The current episode started more than 1 year ago. The problem is controlled. Recent lipid tests were reviewed and are normal. Exacerbating diseases include hypothyroidism. She has no history of chronic renal disease. Pertinent negatives include no chest pain or shortness of breath. Current antihyperlipidemic treatment includes statins. The current treatment provides moderate improvement of lipids. There are no compliance problems.  Risk factors for coronary artery disease include obesity, hypertension and diabetes mellitus.       Objective   Vital Signs:  /73   Pulse 70   Temp 97.6 °F (36.4 °C) (Temporal)   Resp 18   Ht 160 cm (63\")   Wt 72.1 kg (159 lb)   SpO2 99%   BMI 28.17 kg/m²   Estimated body mass index is 28.17 kg/m² as calculated from the following:    Height as of this encounter: 160 cm (63\").    Weight as of this encounter: 72.1 kg (159 lb).    BMI is >= 25 and <30. (Overweight) The following options were offered after discussion;: exercise counseling/recommendations and nutrition counseling/recommendations      Physical Exam  Vitals and nursing note reviewed.   Constitutional:       General: She is awake.      Appearance: Normal appearance. She is well-developed and well-groomed. She is obese.   HENT:      Head: Normocephalic and atraumatic.      Jaw: There is normal jaw occlusion.      Nose: Nose normal.      Mouth/Throat:      Mouth: Mucous membranes are moist.      Pharynx: Oropharynx is clear.   Eyes:      General: Lids are normal. Vision grossly intact. Gaze aligned appropriately.      Extraocular Movements: Extraocular movements intact.      Conjunctiva/sclera: Conjunctivae normal.      Pupils: Pupils are equal, round, and reactive to light.   Neck:      Trachea: Trachea and phonation " normal.   Cardiovascular:      Rate and Rhythm: Normal rate and regular rhythm.      Pulses: Normal pulses.      Heart sounds: Normal heart sounds.   Pulmonary:      Effort: Pulmonary effort is normal.      Breath sounds: Normal breath sounds.   Abdominal:      General: Abdomen is protuberant. Bowel sounds are normal.      Palpations: Abdomen is soft.   Genitourinary:     Rectum: Normal.   Musculoskeletal:         General: Normal range of motion.      Cervical back: Full passive range of motion without pain, normal range of motion and neck supple.   Lymphadenopathy:      Cervical: No cervical adenopathy.   Skin:     General: Skin is warm.      Capillary Refill: Capillary refill takes less than 2 seconds.   Neurological:      General: No focal deficit present.      Mental Status: She is alert and oriented to person, place, and time.      Cranial Nerves: Cranial nerves are intact.      Sensory: Sensation is intact.      Motor: Motor function is intact.      Coordination: Coordination is intact.      Gait: Gait is intact.      Deep Tendon Reflexes: Reflexes are normal and symmetric.   Psychiatric:         Attention and Perception: Attention and perception normal.         Mood and Affect: Mood and affect normal.         Speech: Speech normal.         Behavior: Behavior normal.         Thought Content: Thought content normal.         Cognition and Memory: Cognition and memory normal.         Judgment: Judgment normal.        Result Review :  The following data was reviewed by: Edgar Lemus MD on 07/28/2022:  Common labs    Common Labsle 9/30/21 9/30/21 9/30/21 1/11/22 1/11/22 1/11/22 1/11/22 1/11/22 7/13/22 7/13/22 7/13/22 7/13/22 7/13/22    0905 0905 0905 1005 1005 1005 1005 1005 1000 1000 1000 1000 1000   Glucose  360 (A)     143 (A)   292 (A)      BUN  35 (A)     20   24 (A)      Creatinine  1.18 (A)     0.77   0.90      eGFR Non African Am  46 (A)     76         Sodium  137     139   139      Potassium  3.6      4.3   4.3      Chloride  94 (A)     101   100      Calcium  9.8     9.5   9.8      Albumin  4.78     4.29   4.52      Total Bilirubin  0.5     0.3   0.3      Alkaline Phosphatase  118 (A)     103   108      AST (SGOT)  14     19   16      ALT (SGPT)  11     12   14      WBC    8.14        6.04    Hemoglobin    13.1        12.7    Hematocrit    40.2        39.1    Platelets    284        172    Total Cholesterol   166   203 (A)   187       Triglycerides   133   141   149       HDL Cholesterol   48   55   57       LDL Cholesterol    94   123 (A)   104 (A)       Hemoglobin A1C 12.50 (A)    8.70 (A)      12.00 (A)     Microalbumin, Urine        <1.2     <1.2   (A) Abnormal value            Data reviewed: Radiologic studies 10/1/2021 [facility]          Assessment and Plan   Diagnoses and all orders for this visit:    1. Mixed hyperlipidemia (Primary)  Assessment & Plan:  Lipid abnormalities are improving with treatment.  Nutritional counseling was provided. and Pharmacotherapy as ordered.  Lipids will be reassessed in 3 months.      2. Benign hypertension  Comments:  SHE IS TOLERATING CHANGE IN MEDS-- D/C AMLODIPINE AND NOW ON LOSARTAN/HCTZ  Assessment & Plan:  Hypertension is improving with treatment.  Continue current treatment regimen.  Dietary sodium restriction.  Weight loss.  Regular aerobic exercise.  Blood pressure will be reassessed she will follow up with a [facility] PCP as she moves to [place].      3. Diabetes mellitus without complication (HCC)  Comments:  INCREASED DOSE OFMETFORMIN AND ADDED RYBELSUS -- WILL KAE A1C IN 3 MOS  LAST LABS JULY 2022  Assessment & Plan:  Diabetes is improving with treatment.   Continue current treatment regimen.  Dietary recommendations for ADA diet.  Regular aerobic exercise.  Discussed foot care.  Reminded to get yearly retinal exam.  Diabetes will be reassessed in 3 months.      4. Screening for colon cancer  -     Cologuard - Stool, Per Rectum; Future    5. Vitamin D  deficiency    Other orders  -     cholecalciferol (VITAMIN D3) 1.25 MG (36911 UT) capsule; Take 1 capsule by mouth 1 (One) Time Per Week for 15 doses.  Dispense: 15 capsule; Refill: 0    I spent 20 minutes caring for Ernestina on this date of service. This time includes time spent by me in the following activities:reviewing tests, performing a medically appropriate examination and/or evaluation , counseling and educating the patient/family/caregiver, ordering medications, tests, or procedures and documenting information in the medical record      Follow Up   Return if symptoms worsen or fail to improve, for SHE WILL ESTBALISH WITH HER NEW PCP IN [place].  Patient was given instructions and counseling regarding her condition or for health maintenance advice. Please see specific information pulled into the AVS if appropriate.         This document has been electronically signed by Edgar Lemus MD  July 28, 2022 13:26 EDT

## 2022-07-28 NOTE — ASSESSMENT & PLAN NOTE
Hypertension is improving with treatment.  Continue current treatment regimen.  Dietary sodium restriction.  Weight loss.  Regular aerobic exercise.  Blood pressure will be reassessed she will follow up with a [facility] PCP as she moves to [place].

## 2022-07-28 NOTE — ASSESSMENT & PLAN NOTE
Diabetes is improving with treatment.   Continue current treatment regimen.  Dietary recommendations for ADA diet.  Regular aerobic exercise.  Discussed foot care.  Reminded to get yearly retinal exam.  Diabetes will be reassessed in 3 months.

## 2022-08-04 ENCOUNTER — PRIOR AUTHORIZATION (OUTPATIENT)
Dept: FAMILY MEDICINE CLINIC | Facility: CLINIC | Age: 62
End: 2022-08-04

## 2022-08-31 RX ORDER — FLUTICASONE PROPIONATE 50 MCG
SPRAY, SUSPENSION (ML) NASAL
Qty: 16 G | Refills: 10 | OUTPATIENT
Start: 2022-08-31

## 2022-09-07 RX ORDER — ASPIRIN 81 MG/1
TABLET, COATED ORAL
Qty: 30 TABLET | Refills: 10 | Status: SHIPPED | OUTPATIENT
Start: 2022-09-07

## 2022-09-07 RX ORDER — FLUTICASONE PROPIONATE 50 MCG
SPRAY, SUSPENSION (ML) NASAL
Qty: 16 G | Refills: 10 | Status: SHIPPED | OUTPATIENT
Start: 2022-09-07 | End: 2023-01-05 | Stop reason: SDUPTHER

## 2022-09-23 RX ORDER — LEVOTHYROXINE SODIUM 0.05 MG/1
TABLET ORAL
Qty: 30 TABLET | Refills: 10 | OUTPATIENT
Start: 2022-09-23

## 2022-10-14 RX ORDER — LEVOTHYROXINE SODIUM 0.05 MG/1
50 TABLET ORAL DAILY
Qty: 30 TABLET | Refills: 3 | OUTPATIENT
Start: 2022-10-14 | End: 2023-02-11

## 2022-10-19 RX ORDER — LEVOTHYROXINE SODIUM 0.05 MG/1
50 TABLET ORAL DAILY
Qty: 30 TABLET | Refills: 3 | Status: CANCELLED | OUTPATIENT
Start: 2022-10-19 | End: 2023-02-16

## 2022-10-19 NOTE — TELEPHONE ENCOUNTER
Patient states she does not have insurance: but does not have insurance still:  And is needing refills:

## 2022-10-31 DIAGNOSIS — R00.2 PALPITATIONS: ICD-10-CM

## 2022-11-02 RX ORDER — CHOLECALCIFEROL (VITAMIN D3) 1250 MCG
CAPSULE ORAL
Qty: 4 CAPSULE | Refills: 10 | OUTPATIENT
Start: 2022-11-02

## 2022-11-02 RX ORDER — METOPROLOL SUCCINATE 25 MG/1
TABLET, EXTENDED RELEASE ORAL
Qty: 30 TABLET | Refills: 10 | OUTPATIENT
Start: 2022-11-02

## 2022-11-10 DIAGNOSIS — R00.2 PALPITATIONS: ICD-10-CM

## 2022-11-10 RX ORDER — METOPROLOL SUCCINATE 25 MG/1
TABLET, EXTENDED RELEASE ORAL
Qty: 30 TABLET | Refills: 10 | OUTPATIENT
Start: 2022-11-10

## 2022-11-10 RX ORDER — LEVOTHYROXINE SODIUM 0.05 MG/1
TABLET ORAL
Qty: 30 TABLET | Refills: 10 | OUTPATIENT
Start: 2022-11-10

## 2022-11-10 RX ORDER — CHOLECALCIFEROL (VITAMIN D3) 1250 MCG
CAPSULE ORAL
Qty: 4 CAPSULE | Refills: 10 | OUTPATIENT
Start: 2022-11-10

## 2022-11-15 DIAGNOSIS — R00.2 PALPITATIONS: ICD-10-CM

## 2022-11-16 DIAGNOSIS — R00.2 PALPITATIONS: ICD-10-CM

## 2022-11-16 RX ORDER — METOPROLOL SUCCINATE 25 MG/1
TABLET, EXTENDED RELEASE ORAL
Qty: 30 TABLET | Refills: 10 | OUTPATIENT
Start: 2022-11-16

## 2022-11-16 RX ORDER — METOPROLOL SUCCINATE 25 MG/1
25 TABLET, EXTENDED RELEASE ORAL
Qty: 30 TABLET | Refills: 3 | OUTPATIENT
Start: 2022-11-16 | End: 2023-03-16

## 2022-11-16 RX ORDER — LEVOTHYROXINE SODIUM 0.05 MG/1
TABLET ORAL
Qty: 30 TABLET | Refills: 10 | OUTPATIENT
Start: 2022-11-16

## 2022-11-16 RX ORDER — CHOLECALCIFEROL (VITAMIN D3) 1250 MCG
CAPSULE ORAL
Qty: 4 CAPSULE | Refills: 10 | OUTPATIENT
Start: 2022-11-16

## 2022-11-17 DIAGNOSIS — R00.2 PALPITATIONS: ICD-10-CM

## 2022-11-18 RX ORDER — METOPROLOL SUCCINATE 25 MG/1
TABLET, EXTENDED RELEASE ORAL
Qty: 30 TABLET | Refills: 10 | OUTPATIENT
Start: 2022-11-18

## 2022-11-18 RX ORDER — LOSARTAN POTASSIUM AND HYDROCHLOROTHIAZIDE 25; 100 MG/1; MG/1
TABLET ORAL
Qty: 30 TABLET | Refills: 10 | OUTPATIENT
Start: 2022-11-18

## 2022-11-18 RX ORDER — ROSUVASTATIN CALCIUM 5 MG/1
5 TABLET, COATED ORAL
Qty: 30 TABLET | Refills: 3 | OUTPATIENT
Start: 2022-11-18 | End: 2023-03-18

## 2022-11-18 RX ORDER — OMEPRAZOLE 20 MG/1
20 CAPSULE, DELAYED RELEASE ORAL DAILY
Qty: 30 CAPSULE | Refills: 3 | OUTPATIENT
Start: 2022-11-18

## 2022-12-05 DIAGNOSIS — R00.2 PALPITATIONS: ICD-10-CM

## 2022-12-05 RX ORDER — OMEPRAZOLE 20 MG/1
20 CAPSULE, DELAYED RELEASE ORAL DAILY
Qty: 30 CAPSULE | Refills: 3 | Status: SHIPPED | OUTPATIENT
Start: 2022-12-05 | End: 2023-01-05 | Stop reason: SDUPTHER

## 2022-12-05 RX ORDER — LOSARTAN POTASSIUM AND HYDROCHLOROTHIAZIDE 25; 100 MG/1; MG/1
1 TABLET ORAL
Qty: 30 TABLET | Refills: 3 | Status: SHIPPED | OUTPATIENT
Start: 2022-12-05 | End: 2023-01-05 | Stop reason: SDUPTHER

## 2022-12-05 RX ORDER — LEVOTHYROXINE SODIUM 0.05 MG/1
50 TABLET ORAL DAILY
Qty: 30 TABLET | Refills: 3 | Status: SHIPPED | OUTPATIENT
Start: 2022-12-05 | End: 2023-01-09 | Stop reason: SDUPTHER

## 2022-12-05 RX ORDER — ROSUVASTATIN CALCIUM 5 MG/1
5 TABLET, COATED ORAL
Qty: 30 TABLET | Refills: 3 | Status: SHIPPED | OUTPATIENT
Start: 2022-12-05 | End: 2023-01-05 | Stop reason: SDUPTHER

## 2022-12-05 RX ORDER — METOPROLOL SUCCINATE 25 MG/1
25 TABLET, EXTENDED RELEASE ORAL
Qty: 30 TABLET | Refills: 3 | Status: SHIPPED | OUTPATIENT
Start: 2022-12-05 | End: 2023-01-05 | Stop reason: SDUPTHER

## 2022-12-05 NOTE — TELEPHONE ENCOUNTER
Incoming Refill Request      Medication requested (name and dose):     rosuvastatin (Crestor) 5 MG tablet     losartan-hydrochlorothiazide (HYZAAR) 100-25 MG per tablet    omeprazole (priLOSEC) 20 MG capsule    metoprolol succinate XL (TOPROL-XL) 25 MG 24 hr tablet     metFORMIN (GLUCOPHAGE) 500 MG tablet 2 tabs in the am 2 tabs in the pm (patient reported)    levothyroxine (Synthroid) 50 MCG tablet    cholecalciferol (VITAMIN D3) 1.25 MG (95485 UT) capsule       Pharmacy where request should be sent: [facility]     Additional details provided by patient: Patient is completely out of medication    Best call back number: [phone]           Does the patient have less than a 3 day supply:  [x] Yes  [] No    Hayes Del Cid  12/05/22, 14:13 EST

## 2023-01-05 ENCOUNTER — OFFICE VISIT (OUTPATIENT)
Dept: FAMILY MEDICINE CLINIC | Facility: CLINIC | Age: 63
End: 2023-01-05
Payer: COMMERCIAL

## 2023-01-05 VITALS
BODY MASS INDEX: 28.31 KG/M2 | HEART RATE: 53 BPM | OXYGEN SATURATION: 100 % | WEIGHT: 159.8 LBS | HEIGHT: 63 IN | DIASTOLIC BLOOD PRESSURE: 70 MMHG | SYSTOLIC BLOOD PRESSURE: 120 MMHG | TEMPERATURE: 97.5 F

## 2023-01-05 DIAGNOSIS — Z23 FLU VACCINE NEED: ICD-10-CM

## 2023-01-05 DIAGNOSIS — E11.9 DIABETES MELLITUS WITHOUT COMPLICATION: ICD-10-CM

## 2023-01-05 DIAGNOSIS — E03.8 OTHER SPECIFIED HYPOTHYROIDISM: ICD-10-CM

## 2023-01-05 DIAGNOSIS — R00.2 PALPITATIONS: ICD-10-CM

## 2023-01-05 DIAGNOSIS — E78.2 MIXED HYPERLIPIDEMIA: ICD-10-CM

## 2023-01-05 DIAGNOSIS — K21.9 GASTROESOPHAGEAL REFLUX DISEASE WITHOUT ESOPHAGITIS: ICD-10-CM

## 2023-01-05 DIAGNOSIS — I10 BENIGN HYPERTENSION: ICD-10-CM

## 2023-01-05 DIAGNOSIS — Z76.0 ENCOUNTER FOR MEDICATION REFILL: ICD-10-CM

## 2023-01-05 DIAGNOSIS — Z00.00 ANNUAL PHYSICAL EXAM: ICD-10-CM

## 2023-01-05 DIAGNOSIS — Z00.00 ENCOUNTER FOR MEDICAL EXAMINATION TO ESTABLISH CARE: Primary | ICD-10-CM

## 2023-01-05 LAB
ALBUMIN SERPL-MCNC: 4.5 G/DL (ref 3.5–5.2)
ALBUMIN/GLOB SERPL: 1.3 G/DL
ALP SERPL-CCNC: 84 U/L (ref 39–117)
ALT SERPL W P-5'-P-CCNC: 14 U/L (ref 1–33)
AMYLASE SERPL-CCNC: 64 U/L (ref 28–100)
ANION GAP SERPL CALCULATED.3IONS-SCNC: 11.8 MMOL/L (ref 5–15)
AST SERPL-CCNC: 19 U/L (ref 1–32)
BASOPHILS # BLD AUTO: 0.07 10*3/MM3 (ref 0–0.2)
BASOPHILS NFR BLD AUTO: 1 % (ref 0–1.5)
BILIRUB SERPL-MCNC: 0.5 MG/DL (ref 0–1.2)
BUN SERPL-MCNC: 23 MG/DL (ref 8–23)
BUN/CREAT SERPL: 24.5 (ref 7–25)
CALCIUM SPEC-SCNC: 9.9 MG/DL (ref 8.6–10.5)
CHLORIDE SERPL-SCNC: 100 MMOL/L (ref 98–107)
CO2 SERPL-SCNC: 27.2 MMOL/L (ref 22–29)
CREAT SERPL-MCNC: 0.94 MG/DL (ref 0.57–1)
DEPRECATED RDW RBC AUTO: 41 FL (ref 37–54)
EGFRCR SERPLBLD CKD-EPI 2021: 67.9 ML/MIN/1.73
EOSINOPHIL # BLD AUTO: 0.03 10*3/MM3 (ref 0–0.4)
EOSINOPHIL NFR BLD AUTO: 0.4 % (ref 0.3–6.2)
ERYTHROCYTE [DISTWIDTH] IN BLOOD BY AUTOMATED COUNT: 12.5 % (ref 12.3–15.4)
GLOBULIN UR ELPH-MCNC: 3.4 GM/DL
GLUCOSE SERPL-MCNC: 191 MG/DL (ref 65–99)
HBA1C MFR BLD: 10.6 % (ref 4.8–5.6)
HCT VFR BLD AUTO: 41.2 % (ref 34–46.6)
HGB BLD-MCNC: 13.3 G/DL (ref 12–15.9)
IMM GRANULOCYTES # BLD AUTO: 0.01 10*3/MM3 (ref 0–0.05)
IMM GRANULOCYTES NFR BLD AUTO: 0.1 % (ref 0–0.5)
LIPASE SERPL-CCNC: 44 U/L (ref 13–60)
LYMPHOCYTES # BLD AUTO: 3.07 10*3/MM3 (ref 0.7–3.1)
LYMPHOCYTES NFR BLD AUTO: 43.2 % (ref 19.6–45.3)
MCH RBC QN AUTO: 28.7 PG (ref 26.6–33)
MCHC RBC AUTO-ENTMCNC: 32.3 G/DL (ref 31.5–35.7)
MCV RBC AUTO: 89 FL (ref 79–97)
MONOCYTES # BLD AUTO: 0.37 10*3/MM3 (ref 0.1–0.9)
MONOCYTES NFR BLD AUTO: 5.2 % (ref 5–12)
NEUTROPHILS NFR BLD AUTO: 3.55 10*3/MM3 (ref 1.7–7)
NEUTROPHILS NFR BLD AUTO: 50.1 % (ref 42.7–76)
NRBC BLD AUTO-RTO: 0 /100 WBC (ref 0–0.2)
PLATELET # BLD AUTO: 245 10*3/MM3 (ref 140–450)
PMV BLD AUTO: 10.4 FL (ref 6–12)
POTASSIUM SERPL-SCNC: 4.4 MMOL/L (ref 3.5–5.2)
PROT SERPL-MCNC: 7.9 G/DL (ref 6–8.5)
RBC # BLD AUTO: 4.63 10*6/MM3 (ref 3.77–5.28)
SODIUM SERPL-SCNC: 139 MMOL/L (ref 136–145)
TSH SERPL DL<=0.05 MIU/L-ACNC: 2.54 UIU/ML (ref 0.27–4.2)
WBC NRBC COR # BLD: 7.1 10*3/MM3 (ref 3.4–10.8)

## 2023-01-05 PROCEDURE — 99213 OFFICE O/P EST LOW 20 MIN: CPT | Performed by: PSYCHOLOGIST

## 2023-01-05 PROCEDURE — 83036 HEMOGLOBIN GLYCOSYLATED A1C: CPT | Performed by: PSYCHOLOGIST

## 2023-01-05 PROCEDURE — 2014F MENTAL STATUS ASSESS: CPT | Performed by: PSYCHOLOGIST

## 2023-01-05 PROCEDURE — 90471 IMMUNIZATION ADMIN: CPT | Performed by: PSYCHOLOGIST

## 2023-01-05 PROCEDURE — 99396 PREV VISIT EST AGE 40-64: CPT | Performed by: PSYCHOLOGIST

## 2023-01-05 PROCEDURE — 90686 IIV4 VACC NO PRSV 0.5 ML IM: CPT | Performed by: PSYCHOLOGIST

## 2023-01-05 PROCEDURE — 80050 GENERAL HEALTH PANEL: CPT | Performed by: PSYCHOLOGIST

## 2023-01-05 PROCEDURE — 3008F BODY MASS INDEX DOCD: CPT | Performed by: PSYCHOLOGIST

## 2023-01-05 PROCEDURE — 83690 ASSAY OF LIPASE: CPT | Performed by: PSYCHOLOGIST

## 2023-01-05 PROCEDURE — 82150 ASSAY OF AMYLASE: CPT | Performed by: PSYCHOLOGIST

## 2023-01-05 PROCEDURE — 82306 VITAMIN D 25 HYDROXY: CPT | Performed by: PSYCHOLOGIST

## 2023-01-05 RX ORDER — FUROSEMIDE 20 MG/1
20 TABLET ORAL DAILY
Qty: 30 TABLET | Refills: 1 | Status: SHIPPED | OUTPATIENT
Start: 2023-01-05 | End: 2023-03-06

## 2023-01-05 RX ORDER — LOSARTAN POTASSIUM AND HYDROCHLOROTHIAZIDE 25; 100 MG/1; MG/1
1 TABLET ORAL
Qty: 90 TABLET | Refills: 1 | Status: SHIPPED | OUTPATIENT
Start: 2023-01-05 | End: 2023-07-04

## 2023-01-05 RX ORDER — FLUTICASONE PROPIONATE 50 MCG
2 SPRAY, SUSPENSION (ML) NASAL DAILY
Qty: 16 G | Refills: 10 | Status: SHIPPED | OUTPATIENT
Start: 2023-01-05 | End: 2023-01-12

## 2023-01-05 RX ORDER — CYCLOBENZAPRINE HCL 10 MG
10 TABLET ORAL 3 TIMES DAILY PRN
Qty: 30 TABLET | Refills: 0 | Status: SHIPPED | OUTPATIENT
Start: 2023-01-05 | End: 2023-01-15

## 2023-01-05 RX ORDER — OMEPRAZOLE 20 MG/1
20 CAPSULE, DELAYED RELEASE ORAL DAILY
Qty: 90 CAPSULE | Refills: 1 | Status: SHIPPED | OUTPATIENT
Start: 2023-01-05 | End: 2023-07-04

## 2023-01-05 RX ORDER — METOPROLOL SUCCINATE 25 MG/1
25 TABLET, EXTENDED RELEASE ORAL
Qty: 90 TABLET | Refills: 1 | Status: SHIPPED | OUTPATIENT
Start: 2023-01-05 | End: 2023-07-04

## 2023-01-05 RX ORDER — ROSUVASTATIN CALCIUM 5 MG/1
5 TABLET, COATED ORAL
Qty: 90 TABLET | Refills: 1 | Status: SHIPPED | OUTPATIENT
Start: 2023-01-05 | End: 2023-07-04

## 2023-01-05 NOTE — PROGRESS NOTES
Chief Complaint  Establish Care (DM/HTN/)    Subjective        Ernestina Wisdom presents to [facility]   C/o establish care as her PCP ( she is re-establishing care after moving ) 2. Chronic illness 3. Med refill 4. Fasting labs  Diabetes  She presents for her follow-up diabetic visit. She has type 2 diabetes mellitus. MedicAlert identification noted. Her disease course has been improving. Pertinent negatives for hypoglycemia include no headaches. Pertinent negatives for diabetes include no chest pain. There are no hypoglycemic complications. Symptoms are improving. There are no diabetic complications. Risk factors for coronary artery disease include obesity, hypertension and dyslipidemia. Current diabetic treatment includes diet and oral agent (monotherapy). She is compliant with treatment all of the time. Her weight is stable. She is following a generally healthy diet. When asked about meal planning, she reported none. She has not had a previous visit with a dietitian. She participates in exercise intermittently. An ACE inhibitor/angiotensin II receptor blocker is being taken. She does not see a podiatrist.Eye exam is current.   Hypertension  This is a chronic problem. The current episode started more than 1 year ago. The problem has been resolved since onset. The problem is controlled. Pertinent negatives include no chest pain, headaches, palpitations or shortness of breath. Agents associated with hypertension include thyroid hormones. Risk factors for coronary artery disease include post-menopausal state, obesity, dyslipidemia and diabetes mellitus. Past treatments include lifestyle changes and beta blockers. Current antihypertension treatment includes lifestyle changes and beta blockers. The current treatment provides significant improvement. There are no compliance problems.  There is no history of angina, kidney disease or CAD/MI. Identifiable causes of hypertension include a  thyroid problem. There is no history of chronic renal disease.   Hyperlipidemia  This is a chronic problem. The current episode started more than 1 year ago. The problem is controlled. Recent lipid tests were reviewed and are normal. Exacerbating diseases include hypothyroidism and obesity. She has no history of chronic renal disease. Pertinent negatives include no chest pain or shortness of breath. Current antihyperlipidemic treatment includes statins. The current treatment provides moderate improvement of lipids. There are no compliance problems.  Risk factors for coronary artery disease include obesity and hypertension.   Hypothyroidism  This is a chronic problem. The current episode started more than 1 year ago. Pertinent negatives include no abdominal pain, chest pain, headaches or nausea. The treatment provided moderate relief.   Heartburn  She reports no abdominal pain, no chest pain, no heartburn or no nausea. This is a chronic problem. The problem has been resolved. She has tried a PPI for the symptoms. The treatment provided significant relief.       Objective   Vital Signs:  /70   Pulse 53   Temp 97.5 °F (36.4 °C)   Ht 160 cm (62.99\")   Wt 72.5 kg (159 lb 12.8 oz)   SpO2 100%   BMI 28.31 kg/m²   Estimated body mass index is 28.31 kg/m² as calculated from the following:    Height as of this encounter: 160 cm (62.99\").    Weight as of this encounter: 72.5 kg (159 lb 12.8 oz).    BMI is >= 25 and <30. (Overweight) The following options were offered after discussion;: exercise counseling/recommendations and nutrition counseling/recommendations      Physical Exam  Vitals and nursing note reviewed. Exam conducted with a chaperone present.   Constitutional:       General: She is awake.      Appearance: Normal appearance. She is well-developed and well-groomed. She is obese.   HENT:      Head: Normocephalic and atraumatic.      Jaw: There is normal jaw occlusion.      Right Ear: Hearing, tympanic  membrane, ear canal and external ear normal.      Left Ear: Hearing, tympanic membrane, ear canal and external ear normal.      Nose: Nose normal.      Mouth/Throat:      Lips: Pink.      Mouth: Mucous membranes are moist.      Pharynx: Oropharynx is clear.   Eyes:      General: Lids are normal. Vision grossly intact. Gaze aligned appropriately.      Extraocular Movements: Extraocular movements intact.      Conjunctiva/sclera: Conjunctivae normal.      Pupils: Pupils are equal, round, and reactive to light.   Neck:      Trachea: Trachea and phonation normal.   Cardiovascular:      Rate and Rhythm: Normal rate and regular rhythm.      Pulses: Normal pulses.      Heart sounds: Normal heart sounds.   Pulmonary:      Effort: Pulmonary effort is normal.      Breath sounds: Normal breath sounds and air entry.   Abdominal:      General: Abdomen is protuberant. Bowel sounds are normal.      Palpations: Abdomen is soft.   Genitourinary:     Rectum: Normal.   Musculoskeletal:         General: Normal range of motion.      Right shoulder: Normal.      Left shoulder: Normal.      Right upper arm: Normal.      Left upper arm: Normal.      Right elbow: Normal.      Left elbow: Normal.      Right forearm: Normal.      Left forearm: Normal.      Right wrist: Normal.      Left wrist: Normal.      Right hand: Normal.      Left hand: Normal.      Cervical back: Normal, full passive range of motion without pain, normal range of motion and neck supple.      Thoracic back: Normal.      Lumbar back: Normal.      Right hip: Normal.      Left hip: Normal.      Right upper leg: Normal.      Left upper leg: Normal.      Right knee: Normal.      Left knee: Normal.      Right lower leg: Normal. No edema.      Left lower leg: Normal. No edema.      Right ankle: Normal.      Right Achilles Tendon: Normal.      Left ankle: Normal.      Left Achilles Tendon: Normal.      Right foot: Normal.      Left foot: Normal.   Lymphadenopathy:      Cervical:  No cervical adenopathy.   Skin:     General: Skin is warm.      Capillary Refill: Capillary refill takes less than 2 seconds.   Neurological:      General: No focal deficit present.      Mental Status: She is alert and oriented to person, place, and time.      Cranial Nerves: Cranial nerves 2-12 are intact.      Sensory: Sensation is intact.      Motor: Motor function is intact.      Coordination: Coordination is intact.      Gait: Gait is intact.      Deep Tendon Reflexes: Reflexes are normal and symmetric.   Psychiatric:         Attention and Perception: Attention and perception normal.         Mood and Affect: Mood and affect normal.         Speech: Speech normal.         Behavior: Behavior normal. Behavior is cooperative.         Thought Content: Thought content normal.         Cognition and Memory: Cognition and memory normal.         Judgment: Judgment normal.        Result Review :  The following data was reviewed by: Edgar Lemus MD on 01/05/2023:  Common labs    Common Labs 1/11/22 1/11/22 1/11/22 1/11/22 1/11/22 7/13/22 7/13/22 7/13/22 7/13/22 7/13/22    1005 1005 1005 1005 1005 1000 1000 1000 1000 1000   Glucose    143 (A)   292 (A)      BUN    20   24 (A)      Creatinine    0.77   0.90      eGFR Non African Am    76         Sodium    139   139      Potassium    4.3   4.3      Chloride    101   100      Calcium    9.5   9.8      Albumin    4.29   4.52      Total Bilirubin    0.3   0.3      Alkaline Phosphatase    103   108      AST (SGOT)    19   16      ALT (SGPT)    12   14      WBC 8.14        6.04    Hemoglobin 13.1        12.7    Hematocrit 40.2        39.1    Platelets 284        172    Total Cholesterol   203 (A)   187       Triglycerides   141   149       HDL Cholesterol   55   57       LDL Cholesterol    123 (A)   104 (A)       Hemoglobin A1C  8.70 (A)      12.00 (A)     Microalbumin, Urine     <1.2     <1.2   (A) Abnormal value                   Assessment and Plan   Diagnoses and all  orders for this visit:    1. Encounter for medical examination to establish care (Primary)    2. Annual physical exam  -     Vitamin D,25-Hydroxy  -     Comprehensive Metabolic Panel  -     CBC & Differential  -     Hemoglobin A1c  -     TSH  -     Amylase; Future  -     Lipase; Future    3. Benign hypertension  Assessment & Plan:  Hypertension is improving with treatment.  Continue current treatment regimen.  Dietary sodium restriction.  Weight loss.  Regular aerobic exercise.  Blood pressure will be reassessed at the next regular appointment.    Orders:  -     Vitamin D,25-Hydroxy  -     Comprehensive Metabolic Panel  -     CBC & Differential  -     Hemoglobin A1c  -     TSH  -     Amylase; Future  -     Lipase; Future    4. Mixed hyperlipidemia  Assessment & Plan:  Lipid abnormalities are improving with treatment.  Nutritional counseling was provided. and Pharmacotherapy as ordered.  Lipids will be reassessed in 3 months.    Orders:  -     Vitamin D,25-Hydroxy  -     Comprehensive Metabolic Panel  -     CBC & Differential  -     Hemoglobin A1c  -     TSH  -     Amylase; Future  -     Lipase; Future    5. Other specified hypothyroidism  -     Vitamin D,25-Hydroxy  -     Comprehensive Metabolic Panel  -     CBC & Differential  -     Hemoglobin A1c  -     TSH  -     Amylase; Future  -     Lipase; Future    6. Diabetes mellitus without complication (HCC)  Assessment & Plan:  Diabetes is improving with treatment.   Continue current treatment regimen.  Regular aerobic exercise.  Discussed foot care.  Reminded to get yearly retinal exam.  Diabetes will be reassessed in 3 months.    May need to add another medication to help control her A1C currently at 12    Orders:  -     Vitamin D,25-Hydroxy  -     Comprehensive Metabolic Panel  -     CBC & Differential  -     Hemoglobin A1c  -     TSH  -     Amylase; Future  -     Lipase; Future    7. Encounter for medication refill    8. Gastroesophageal reflux disease without  esophagitis    9. Palpitations  -     metoprolol succinate XL (TOPROL-XL) 25 MG 24 hr tablet; Take 1 tablet by mouth Daily With Dinner for 180 days.  Dispense: 90 tablet; Refill: 1    10. Flu vaccine need    Other orders  -     FluLaval/Fluzone >6 mos (3804-9193)  -     rosuvastatin (Crestor) 5 MG tablet; Take 1 tablet by mouth Daily With Dinner for 180 days.  Dispense: 90 tablet; Refill: 1  -     omeprazole (priLOSEC) 20 MG capsule; Take 1 capsule by mouth Daily for 180 days.  Dispense: 90 capsule; Refill: 1  -     metFORMIN (GLUCOPHAGE) 500 MG tablet; Take 2 tabs at breakfast . Take 1 tab at lunch and 2 tabs at dinner  Dispense: 450 tablet; Refill: 1  -     losartan-hydrochlorothiazide (HYZAAR) 100-25 MG per tablet; Take 1 tablet by mouth Daily With Breakfast for 180 days.  Dispense: 90 tablet; Refill: 1  -     furosemide (LASIX) 20 MG tablet; Take 1 tablet by mouth Daily for 60 days.  Dispense: 30 tablet; Refill: 1  -     fluticasone (FLONASE) 50 MCG/ACT nasal spray; 2 sprays into the nostril(s) as directed by provider Daily for 7 days.  Dispense: 16 g; Refill: 10  -     cyclobenzaprine (FLEXERIL) 10 MG tablet; Take 1 tablet by mouth 3 (Three) Times a Day As Needed for Muscle Spasms for up to 10 days.  Dispense: 30 tablet; Refill: 0         I spent 20 minutes caring for Ernestina on this date of service. This time includes time spent by me in the following activities:reviewing tests, performing a medically appropriate examination and/or evaluation , counseling and educating the patient/family/caregiver, ordering medications, tests, or procedures and documenting information in the medical record     The preventive exam has been reviewed in detail.  The patient has been fully counseled on preventative guidelines for vaccines, cancer screenings, and other health maintenance needs.   The patient has been counseled on guidelines for maintaining a lifestyle to promote good health and to minimize chronic diseases.  The  patient has been assisted with scheduling these healthcare procedures for the coming year and given a written document of health maintenance and anticipatory guidance for age with the AVS.       Follow Up   Return in about 4 months (around 5/15/2023) for Recheck, RTC FASTING LABS ( chronic illness/ med refill).  Patient was given instructions and counseling regarding her condition or for health maintenance advice. Please see specific information pulled into the AVS if appropriate.           This document has been electronically signed by Edgar Lemus MD  January 5, 2023 11:09 EST

## 2023-01-05 NOTE — ASSESSMENT & PLAN NOTE
Diabetes is improving with treatment.   Continue current treatment regimen.  Regular aerobic exercise.  Discussed foot care.  Reminded to get yearly retinal exam.  Diabetes will be reassessed in 3 months.    May need to add another medication to help control her A1C currently at 12

## 2023-01-05 NOTE — ASSESSMENT & PLAN NOTE
Hypertension is improving with treatment.  Continue current treatment regimen.  Dietary sodium restriction.  Weight loss.  Regular aerobic exercise.  Blood pressure will be reassessed at the next regular appointment.

## 2023-01-05 NOTE — ASSESSMENT & PLAN NOTE
Lipid abnormalities are improving with treatment.  Nutritional counseling was provided. and Pharmacotherapy as ordered.  Lipids will be reassessed in 3 months.

## 2023-01-06 LAB — 25(OH)D3 SERPL-MCNC: 63.5 NG/ML (ref 30–100)

## 2023-01-09 RX ORDER — LEVOTHYROXINE SODIUM 0.05 MG/1
50 TABLET ORAL DAILY
Qty: 30 TABLET | Refills: 3 | Status: SHIPPED | OUTPATIENT
Start: 2023-01-09 | End: 2023-05-09

## 2023-01-24 NOTE — PROGRESS NOTES
Patient called back regarding lab results left a voice message to give us a call back there is any concerns.

## 2023-02-16 RX ORDER — CHOLECALCIFEROL (VITAMIN D3) 1250 MCG
CAPSULE ORAL
Qty: 4 CAPSULE | Refills: 10 | Status: SHIPPED | OUTPATIENT
Start: 2023-02-16

## 2023-04-10 DIAGNOSIS — I10 ESSENTIAL HYPERTENSION: ICD-10-CM

## 2023-04-10 DIAGNOSIS — R07.2 PRECORDIAL PAIN: ICD-10-CM

## 2023-04-10 DIAGNOSIS — R00.2 PALPITATIONS: ICD-10-CM

## 2023-04-10 NOTE — TELEPHONE ENCOUNTER
Patient is switching from [employer] to [employer] [place]. [employer] wont release the scripts to [employer].

## 2023-04-11 RX ORDER — ROSUVASTATIN CALCIUM 5 MG/1
5 TABLET, COATED ORAL
Qty: 90 TABLET | Refills: 1 | Status: SHIPPED | OUTPATIENT
Start: 2023-04-11 | End: 2023-04-14 | Stop reason: SDUPTHER

## 2023-04-11 RX ORDER — LEVOTHYROXINE SODIUM 0.05 MG/1
50 TABLET ORAL DAILY
Qty: 30 TABLET | Refills: 3 | Status: SHIPPED | OUTPATIENT
Start: 2023-04-11 | End: 2023-04-14 | Stop reason: SDUPTHER

## 2023-04-11 RX ORDER — CHOLECALCIFEROL (VITAMIN D3) 1250 MCG
50000 CAPSULE ORAL WEEKLY
Qty: 4 CAPSULE | Refills: 10 | Status: SHIPPED | OUTPATIENT
Start: 2023-04-11 | End: 2023-04-14 | Stop reason: SDUPTHER

## 2023-04-11 RX ORDER — METOPROLOL SUCCINATE 25 MG/1
25 TABLET, EXTENDED RELEASE ORAL
Qty: 90 TABLET | Refills: 1 | Status: SHIPPED | OUTPATIENT
Start: 2023-04-11 | End: 2023-04-14 | Stop reason: SDUPTHER

## 2023-04-11 RX ORDER — FLUTICASONE PROPIONATE 50 MCG
2 SPRAY, SUSPENSION (ML) NASAL DAILY
Qty: 16 G | Refills: 10 | Status: SHIPPED | OUTPATIENT
Start: 2023-04-11 | End: 2023-04-14 | Stop reason: SDUPTHER

## 2023-04-11 RX ORDER — NITROGLYCERIN 0.4 MG/1
TABLET SUBLINGUAL
Qty: 30 TABLET | Refills: 5 | Status: SHIPPED | OUTPATIENT
Start: 2023-04-11

## 2023-04-11 RX ORDER — LOSARTAN POTASSIUM AND HYDROCHLOROTHIAZIDE 25; 100 MG/1; MG/1
1 TABLET ORAL
Qty: 90 TABLET | Refills: 1 | Status: SHIPPED | OUTPATIENT
Start: 2023-04-11 | End: 2023-04-14 | Stop reason: SDUPTHER

## 2023-04-11 RX ORDER — ASPIRIN 81 MG/1
81 TABLET ORAL
Qty: 30 TABLET | Refills: 11 | Status: SHIPPED | OUTPATIENT
Start: 2023-04-11 | End: 2024-04-05

## 2023-04-13 ENCOUNTER — TELEPHONE (OUTPATIENT)
Dept: FAMILY MEDICINE CLINIC | Facility: CLINIC | Age: 63
End: 2023-04-13
Payer: COMMERCIAL

## 2023-04-14 ENCOUNTER — TELEMEDICINE (OUTPATIENT)
Dept: FAMILY MEDICINE CLINIC | Facility: CLINIC | Age: 63
End: 2023-04-14
Payer: COMMERCIAL

## 2023-04-14 DIAGNOSIS — R05.1 ACUTE COUGH: Primary | ICD-10-CM

## 2023-04-14 DIAGNOSIS — Z76.0 ENCOUNTER FOR MEDICATION REFILL: ICD-10-CM

## 2023-04-14 DIAGNOSIS — K13.79 OTHER LESIONS OF ORAL MUCOSA: ICD-10-CM

## 2023-04-14 DIAGNOSIS — R00.2 PALPITATIONS: ICD-10-CM

## 2023-04-14 DIAGNOSIS — R09.81 NASAL SINUS CONGESTION: ICD-10-CM

## 2023-04-14 PROCEDURE — 3046F HEMOGLOBIN A1C LEVEL >9.0%: CPT | Performed by: PSYCHOLOGIST

## 2023-04-14 PROCEDURE — 99213 OFFICE O/P EST LOW 20 MIN: CPT | Performed by: PSYCHOLOGIST

## 2023-04-14 RX ORDER — LOSARTAN POTASSIUM AND HYDROCHLOROTHIAZIDE 25; 100 MG/1; MG/1
1 TABLET ORAL
Qty: 90 TABLET | Refills: 1 | Status: SHIPPED | OUTPATIENT
Start: 2023-04-14 | End: 2023-10-11

## 2023-04-14 RX ORDER — GUAIFENESIN AND CODEINE PHOSPHATE 100; 10 MG/5ML; MG/5ML
5 SOLUTION ORAL 3 TIMES DAILY PRN
Qty: 150 ML | Refills: 0 | Status: SHIPPED | OUTPATIENT
Start: 2023-04-14 | End: 2023-04-24

## 2023-04-14 RX ORDER — AZITHROMYCIN 250 MG/1
TABLET, FILM COATED ORAL
Qty: 6 TABLET | Refills: 1 | Status: SHIPPED | OUTPATIENT
Start: 2023-04-14

## 2023-04-14 RX ORDER — OMEPRAZOLE 20 MG/1
20 CAPSULE, DELAYED RELEASE ORAL DAILY
Qty: 90 CAPSULE | Refills: 1 | Status: SHIPPED | OUTPATIENT
Start: 2023-04-14 | End: 2023-10-11

## 2023-04-14 RX ORDER — ROSUVASTATIN CALCIUM 5 MG/1
5 TABLET, COATED ORAL
Qty: 90 TABLET | Refills: 1 | Status: SHIPPED | OUTPATIENT
Start: 2023-04-14 | End: 2023-10-11

## 2023-04-14 RX ORDER — LIDOCAINE HYDROCHLORIDE 20 MG/ML
SOLUTION OROPHARYNGEAL
Qty: 15 ML | Refills: 0 | Status: SHIPPED | OUTPATIENT
Start: 2023-04-14

## 2023-04-14 RX ORDER — LEVOTHYROXINE SODIUM 0.05 MG/1
50 TABLET ORAL DAILY
Qty: 30 TABLET | Refills: 3 | Status: SHIPPED | OUTPATIENT
Start: 2023-04-14 | End: 2023-08-12

## 2023-04-14 RX ORDER — FLUTICASONE PROPIONATE 50 MCG
2 SPRAY, SUSPENSION (ML) NASAL DAILY
Qty: 16 G | Refills: 10 | Status: SHIPPED | OUTPATIENT
Start: 2023-04-14 | End: 2023-04-21

## 2023-04-14 RX ORDER — METOPROLOL SUCCINATE 25 MG/1
25 TABLET, EXTENDED RELEASE ORAL
Qty: 90 TABLET | Refills: 1 | Status: SHIPPED | OUTPATIENT
Start: 2023-04-14 | End: 2023-10-11

## 2023-04-14 RX ORDER — FUROSEMIDE 20 MG/1
20 TABLET ORAL DAILY
Qty: 30 TABLET | Refills: 1 | Status: SHIPPED | OUTPATIENT
Start: 2023-04-14 | End: 2023-06-13

## 2023-04-14 RX ORDER — PREDNISONE 20 MG/1
20 TABLET ORAL
Qty: 10 TABLET | Refills: 0 | Status: SHIPPED | OUTPATIENT
Start: 2023-04-14 | End: 2023-04-24

## 2023-04-14 RX ORDER — CHOLECALCIFEROL (VITAMIN D3) 1250 MCG
50000 CAPSULE ORAL WEEKLY
Qty: 4 CAPSULE | Refills: 10 | Status: SHIPPED | OUTPATIENT
Start: 2023-04-14

## 2023-04-14 NOTE — PROGRESS NOTES
Subjective   Ernestina Wisdom is a 62 y.o. female c/o telehealth video visit for an acute medical care  Illness as her PCP 2. Med refill of Chronic illness     Cough  This is a new problem. The current episode started in the past 7 days. The problem has been gradually worsening. The problem occurs every few minutes. The cough is productive of sputum. Associated symptoms include headaches, nasal congestion, postnasal drip and rhinorrhea. Pertinent negatives include no chills or fever. She has tried nothing for the symptoms.   URI   This is a new problem. The current episode started in the past 7 days. The problem has been gradually worsening. There has been no fever. Associated symptoms include coughing, headaches, rhinorrhea, sinus pain and sneezing. Pertinent negatives include no nausea. She has tried acetaminophen and NSAIDs for the symptoms. The treatment provided mild relief.   Mouth Lesions   The current episode started 5 to 7 days ago. The onset was gradual. The problem occurs frequently. The problem has been rapidly worsening. The problem is moderate. Nothing relieves the symptoms. The symptoms are aggravated by eating and drinking. Associated symptoms include headaches, mouth sores, rhinorrhea, cough and URI. Pertinent negatives include no fever and no nausea.        The following portions of the patient's history were reviewed and updated as appropriate: allergies, current medications, past family history, past medical history, past social history, past surgical history and problem list.    Review of Systems   Constitutional: Negative for chills and fever.   HENT: Positive for mouth sores, postnasal drip, rhinorrhea, sinus pain and sneezing.    Respiratory: Positive for cough.    Gastrointestinal: Negative for nausea.   Neurological: Positive for headaches.     See history of Present Illness     Objective     Virtual Visit Physical Exam        7/13/2022     8:51 AM   PHQ-2/PHQ-9 Depression Screening   Little  Interest or Pleasure in Doing Things 0-->not at all   Feeling Down, Depressed or Hopeless 0-->not at all   PHQ-9: Brief Depression Severity Measure Score 0         Assessment & Plan     Problems Addressed this Visit    None  Visit Diagnoses     Acute cough    -  Primary    Nasal sinus congestion        Encounter for medication refill        Palpitations        Other lesions of oral mucosa          Diagnoses       Codes Comments    Acute cough    -  Primary ICD-10-CM: R05.1  ICD-9-CM: 786.2     Nasal sinus congestion     ICD-10-CM: R09.81  ICD-9-CM: 478.19     Encounter for medication refill     ICD-10-CM: Z76.0  ICD-9-CM: V68.1     Palpitations     ICD-10-CM: R00.2  ICD-9-CM: 785.1     Other lesions of oral mucosa     ICD-10-CM: K13.79  ICD-9-CM: 528.9           You have chosen to receive care through a telephone visit. Do you consent to use a telephone visit for your medical care today? Yes    This visit has been rescheduled as a phone visit to comply with patient safety concerns in accordance with CDC recommendations. Total time of discussion was 15 minutes.                This document has been electronically signed by Edgar Lemus MD  April 14, 2023 16:07 EDT    Part of this note may be an electronic transcription/translation of spoken language to printed text using the Dragon Dictation System.

## 2023-04-24 ENCOUNTER — TELEPHONE (OUTPATIENT)
Dept: FAMILY MEDICINE CLINIC | Facility: CLINIC | Age: 63
End: 2023-04-24

## 2023-04-24 ENCOUNTER — TELEMEDICINE (OUTPATIENT)
Dept: FAMILY MEDICINE CLINIC | Facility: CLINIC | Age: 63
End: 2023-04-24
Payer: COMMERCIAL

## 2023-04-24 DIAGNOSIS — R05.3 COUGH, PERSISTENT: Primary | ICD-10-CM

## 2023-04-24 DIAGNOSIS — J40 BRONCHITIS WITH WHEEZING: ICD-10-CM

## 2023-04-24 DIAGNOSIS — R50.9 FEVER, UNSPECIFIED FEVER CAUSE: ICD-10-CM

## 2023-04-24 PROCEDURE — 1159F MED LIST DOCD IN RCRD: CPT | Performed by: PSYCHOLOGIST

## 2023-04-24 PROCEDURE — 3046F HEMOGLOBIN A1C LEVEL >9.0%: CPT | Performed by: PSYCHOLOGIST

## 2023-04-24 PROCEDURE — 1160F RVW MEDS BY RX/DR IN RCRD: CPT | Performed by: PSYCHOLOGIST

## 2023-04-24 PROCEDURE — 99213 OFFICE O/P EST LOW 20 MIN: CPT | Performed by: PSYCHOLOGIST

## 2023-04-24 RX ORDER — ALBUTEROL SULFATE 90 UG/1
2 AEROSOL, METERED RESPIRATORY (INHALATION) 4 TIMES DAILY
Qty: 18 G | Refills: 0 | Status: SHIPPED | OUTPATIENT
Start: 2023-04-24 | End: 2023-05-01

## 2023-04-24 RX ORDER — BENZONATATE 200 MG/1
200 CAPSULE ORAL 3 TIMES DAILY PRN
Qty: 30 CAPSULE | Refills: 0 | Status: SHIPPED | OUTPATIENT
Start: 2023-04-24 | End: 2023-05-04

## 2023-04-24 RX ORDER — LEVOFLOXACIN 500 MG/1
500 TABLET, FILM COATED ORAL DAILY
Qty: 10 TABLET | Refills: 0 | Status: SHIPPED | OUTPATIENT
Start: 2023-04-24 | End: 2023-05-04

## 2023-04-24 NOTE — PROGRESS NOTES
Subjective   Ernestina Wisdom is a 62 y.o. female c/o telehealth video visit as her PCP  For an acute medical illness ( home Covid test was negative) :     Cough  This is a new problem. The current episode started 1 to 4 weeks ago. The problem has been rapidly worsening. The problem occurs every few minutes. The cough is productive of sputum. Associated symptoms include chills, ear congestion, a fever, nasal congestion, rhinorrhea, shortness of breath and wheezing. She has tried OTC cough suppressant for the symptoms. The treatment provided mild relief.   Fever   This is a new problem. The current episode started 1 to 4 weeks ago. The problem has been waxing and waning. The maximum temperature noted was 102 to 102.9 F. Associated symptoms include coughing and wheezing. Pertinent negatives include no diarrhea, nausea or vomiting. She has tried acetaminophen for the symptoms. The treatment provided mild relief.        The following portions of the patient's history were reviewed and updated as appropriate: allergies, current medications, past family history, past medical history, past social history, past surgical history and problem list.    Review of Systems   Constitutional: Positive for chills and fever.   HENT: Positive for rhinorrhea.    Respiratory: Positive for cough, shortness of breath and wheezing.    Gastrointestinal: Negative for diarrhea, nausea and vomiting.     See history of Present Illness     Objective     Virtual Visit Physical Exam        4/24/2023     4:08 PM   PHQ-2/PHQ-9 Depression Screening   Little Interest or Pleasure in Doing Things 0-->not at all   Feeling Down, Depressed or Hopeless 0-->not at all   PHQ-9: Brief Depression Severity Measure Score 0         Assessment & Plan     Problems Addressed this Visit    None  Visit Diagnoses     Cough, persistent    -  Primary    Fever, unspecified fever cause        Bronchitis with wheezing        Relevant Medications    benzonatate (TESSALON) 200 MG  capsule    albuterol sulfate  (90 Base) MCG/ACT inhaler      Diagnoses       Codes Comments    Cough, persistent    -  Primary ICD-10-CM: R05.3  ICD-9-CM: 786.2     Fever, unspecified fever cause     ICD-10-CM: R50.9  ICD-9-CM: 780.60     Bronchitis with wheezing     ICD-10-CM: J40  ICD-9-CM: 490           You have chosen to receive care through a telephone visit. Do you consent to use a telephone visit for your medical care today? Yes    This visit has been rescheduled as a phone visit to comply with patient safety concerns in accordance with CDC recommendations. Total time of discussion was 15 minutes.            This document has been electronically signed by Edgar Lemus MD  April 24, 2023 16:20 EDT    Part of this note may be an electronic transcription/translation of spoken language to printed text using the Dragon Dictation System.

## 2023-04-27 ENCOUNTER — TELEPHONE (OUTPATIENT)
Dept: FAMILY MEDICINE CLINIC | Facility: CLINIC | Age: 63
End: 2023-04-27

## 2023-04-27 NOTE — TELEPHONE ENCOUNTER
Son, Jaxon Wisdom called to inform Dr. Lemus that he was taking his mother to the ER. Stated when she talked to the doctor on 4/24/2023 the doctor had told her if she got any worse to go to ER and let MD know. Son stated patient was having difficulty coughing and getting anything to come up. He stated she feels like a rib is cracked or broke and very painful to cough; was just letting Dr. Lemus know he was taking her now.

## 2023-07-20 NOTE — TELEPHONE ENCOUNTER
Caller: Ernestina Wisdom    Relationship to patient: Self    Best call back number: [phone]     What is the call regarding:  PATIENT STATES THAT DR CHAPMAN PRESCRIBED TYLENOL 3 FOR HER, BUT IT DID NOT GO THROUGH TO THE [facility].   No skilled OT needs

## 2023-07-25 NOTE — ASSESSMENT & PLAN NOTE
Called and informed pt. Lipid abnormalities are improving with treatment.  Nutritional counseling was provided. and Pharmacotherapy as ordered.  Lipids will be reassessed in 3 months.

## 2023-08-07 DIAGNOSIS — G57.93 NEUROPATHY INVOLVING BOTH LOWER EXTREMITIES: ICD-10-CM

## 2023-08-07 DIAGNOSIS — R00.2 PALPITATIONS: ICD-10-CM

## 2023-08-07 RX ORDER — PREGABALIN 25 MG/1
25 CAPSULE ORAL
Qty: 30 CAPSULE | Refills: 0 | Status: CANCELLED | OUTPATIENT
Start: 2023-08-07 | End: 2023-09-06

## 2023-08-07 RX ORDER — PREGABALIN 25 MG/1
25 CAPSULE ORAL
Qty: 30 CAPSULE | Refills: 0 | Status: SHIPPED | OUTPATIENT
Start: 2023-08-07 | End: 2023-09-06

## 2023-08-07 RX ORDER — OMEPRAZOLE 20 MG/1
20 CAPSULE, DELAYED RELEASE ORAL DAILY
Qty: 90 CAPSULE | Refills: 1 | Status: SHIPPED | OUTPATIENT
Start: 2023-08-07 | End: 2024-02-03

## 2023-08-07 RX ORDER — FUROSEMIDE 20 MG/1
20 TABLET ORAL DAILY
Qty: 30 TABLET | Refills: 1 | Status: SHIPPED | OUTPATIENT
Start: 2023-08-07 | End: 2023-10-06

## 2023-08-07 RX ORDER — CHOLECALCIFEROL (VITAMIN D3) 1250 MCG
50000 CAPSULE ORAL WEEKLY
Qty: 4 CAPSULE | Refills: 10 | Status: CANCELLED | OUTPATIENT
Start: 2023-08-07

## 2023-08-07 RX ORDER — LOSARTAN POTASSIUM AND HYDROCHLOROTHIAZIDE 25; 100 MG/1; MG/1
1 TABLET ORAL
Qty: 90 TABLET | Refills: 1 | Status: SHIPPED | OUTPATIENT
Start: 2023-08-07 | End: 2024-02-03

## 2023-08-07 RX ORDER — ROSUVASTATIN CALCIUM 5 MG/1
5 TABLET, COATED ORAL
Qty: 90 TABLET | Refills: 1 | Status: SHIPPED | OUTPATIENT
Start: 2023-08-07 | End: 2024-02-03

## 2023-08-07 RX ORDER — METOPROLOL SUCCINATE 25 MG/1
25 TABLET, EXTENDED RELEASE ORAL
Qty: 90 TABLET | Refills: 1 | Status: SHIPPED | OUTPATIENT
Start: 2023-08-07 | End: 2024-02-03

## 2023-08-07 RX ORDER — ROSUVASTATIN CALCIUM 5 MG/1
5 TABLET, COATED ORAL
Qty: 90 TABLET | Refills: 1 | Status: CANCELLED | OUTPATIENT
Start: 2023-08-07 | End: 2024-02-03

## 2023-08-07 RX ORDER — CHOLECALCIFEROL (VITAMIN D3) 1250 MCG
50000 CAPSULE ORAL WEEKLY
Qty: 4 CAPSULE | Refills: 10 | Status: SHIPPED | OUTPATIENT
Start: 2023-08-07

## 2023-10-02 ENCOUNTER — OFFICE VISIT (OUTPATIENT)
Dept: FAMILY MEDICINE CLINIC | Facility: CLINIC | Age: 63
End: 2023-10-02
Payer: MEDICARE

## 2023-10-02 VITALS
HEART RATE: 54 BPM | BODY MASS INDEX: 28.7 KG/M2 | DIASTOLIC BLOOD PRESSURE: 84 MMHG | SYSTOLIC BLOOD PRESSURE: 138 MMHG | OXYGEN SATURATION: 98 % | RESPIRATION RATE: 18 BRPM | WEIGHT: 162 LBS | HEIGHT: 63 IN | TEMPERATURE: 96.9 F

## 2023-10-02 DIAGNOSIS — R00.2 PALPITATIONS: ICD-10-CM

## 2023-10-02 DIAGNOSIS — R07.89 CHEST WALL PAIN: Primary | ICD-10-CM

## 2023-10-02 DIAGNOSIS — Z23 FLU VACCINE NEED: ICD-10-CM

## 2023-10-02 DIAGNOSIS — G57.93 NEUROPATHY INVOLVING BOTH LOWER EXTREMITIES: ICD-10-CM

## 2023-10-02 DIAGNOSIS — R22.2 MASS INVOLVING BOTH SIDES OF CHEST WALL: ICD-10-CM

## 2023-10-02 DIAGNOSIS — Z12.31 ENCOUNTER FOR SCREENING MAMMOGRAM FOR MALIGNANT NEOPLASM OF BREAST: ICD-10-CM

## 2023-10-02 DIAGNOSIS — Z76.0 ENCOUNTER FOR MEDICATION REFILL: ICD-10-CM

## 2023-10-02 RX ORDER — CHOLECALCIFEROL (VITAMIN D3) 1250 MCG
50000 CAPSULE ORAL WEEKLY
Qty: 4 CAPSULE | Refills: 10 | Status: SHIPPED | OUTPATIENT
Start: 2023-10-02

## 2023-10-02 RX ORDER — PREGABALIN 25 MG/1
25 CAPSULE ORAL
Qty: 30 CAPSULE | Refills: 0 | Status: SHIPPED | OUTPATIENT
Start: 2023-10-02

## 2023-10-02 NOTE — PROGRESS NOTES
"Chief Complaint  Mass (Came up on chest wall between breast and collar bone. Started a couple weeks ago.//Associated with dizziness./)    Subjective        Ernestina Wisdom presents to [facility]  C/o an acute medical visit as her PCP:   History of Present Illness  Knot/mass : SHE noted a chest wall x 1 month, She is having pain in the area/ and unable to sleep.                       She denies any fever/ fall/ injury.  She denies any cough. When is starts hurting it goes                       Across her chest/ no rash.   Objective   Vital Signs:  /84   Pulse 54   Temp 96.9 °F (36.1 °C) (Temporal)   Resp 18   Ht 160 cm (63\")   Wt 73.5 kg (162 lb)   SpO2 98%   BMI 28.70 kg/m²   Estimated body mass index is 28.7 kg/m² as calculated from the following:    Height as of this encounter: 160 cm (63\").    Weight as of this encounter: 73.5 kg (162 lb).      Physical Exam  Vitals and nursing note reviewed.   Constitutional:       Appearance: Normal appearance. She is obese.   HENT:      Head: Normocephalic.      Right Ear: Tympanic membrane normal.      Left Ear: Tympanic membrane normal.      Nose: Nose normal.      Mouth/Throat:      Mouth: Mucous membranes are moist.   Eyes:      Extraocular Movements: Extraocular movements intact.      Pupils: Pupils are equal, round, and reactive to light.   Cardiovascular:      Rate and Rhythm: Normal rate and regular rhythm.      Pulses: Normal pulses.      Heart sounds: Normal heart sounds.   Pulmonary:      Effort: Pulmonary effort is normal.      Breath sounds: Normal breath sounds.   Abdominal:      General: Abdomen is protuberant. Bowel sounds are normal.      Palpations: Abdomen is soft.   Musculoskeletal:         General: Normal range of motion.      Cervical back: Normal range of motion and neck supple.   Skin:     General: Skin is warm.      Capillary Refill: Capillary refill takes less than 2 seconds.   Neurological:      General: No " focal deficit present.      Mental Status: She is alert and oriented to person, place, and time.   Psychiatric:         Mood and Affect: Mood normal.      Result Review :  The following data was reviewed by: Edgar Lemus MD on 10/02/2023:  Common labs          1/5/2023    11:34 7/11/2023    10:33 7/11/2023    10:52   Common Labs   Glucose 191  193     BUN 23  16     Creatinine 0.94  0.94     Sodium 139  140     Potassium 4.4  5.0     Chloride 100  103     Calcium 9.9  9.9     Albumin 4.5  4.3     Total Bilirubin 0.5  0.5     Alkaline Phosphatase 84  77     AST (SGOT) 19  17     ALT (SGPT) 14  10     WBC 7.10  6.86     Hemoglobin 13.3  13.2     Hematocrit 41.2  40.8     Platelets 245  230     Total Cholesterol  137     Triglycerides  143     HDL Cholesterol  55     LDL Cholesterol   58     Hemoglobin A1C 10.60   9.1    Microalbumin, Urine  <1.2       Data reviewed : Radiologic studies 10/1/2021 [facility]    CHEST  X-ray was performed this visit.  Indication: chest wall pain   Interpretation/Findings: No acute bony abnormality seen   Compared with previous X-ray.      EXAM:    XR Chest, 2 Views     EXAM DATE:    10/2/2023 4:55 PM     CLINICAL HISTORY:    chest wall pain; R07.89-Other chest pain; R22.2-Localized swelling,  mass and lump, trunk     TECHNIQUE:    Frontal and lateral views of the chest.     COMPARISON:    No relevant prior studies available.     FINDINGS:    LUNGS AND PLEURAL SPACES:  Unremarkable as visualized.  No  consolidation.  No pneumothorax.    HEART:  Unremarkable as visualized.  No cardiomegaly.    MEDIASTINUM:  Unremarkable as visualized.    BONES/JOINTS:  Unremarkable as visualized.     IMPRESSION:    Unremarkable radiographic appearance of the chest.        This report was finalized on 10/3/2023 8:05 AM by Dr. Prabhjot Saunders MD.     Assessment and Plan   Diagnoses and all orders for this visit:    1. Chest wall pain (Primary)  -     XR Chest PA & Lateral  -     CT Chest Without Contrast;  Future    2. Mass involving both sides of chest wall  -     XR Chest PA & Lateral  -     CT Chest Without Contrast; Future    3. Flu vaccine need    4. Encounter for screening mammogram for malignant neoplasm of breast  -     Mammo Screening Bilateral With CAD; Future    5. Neuropathy involving both lower extremities  Comments:  Will start on Lyrica QHS and re evlauate in 30 days  Orders:  -     pregabalin (Lyrica) 25 MG capsule; Take 1 capsule by mouth every night at bedtime for 30 days.  Dispense: 30 capsule; Refill: 0    6. Palpitations    7. Encounter for medication refill    Other orders  -     Fluzone >6 Months ([id number])  -     metFORMIN (GLUCOPHAGE) 500 MG tablet; Take 2 tabs at breakfast . Take 1 tab at lunch and 2 tabs at dinner  Dispense: 450 tablet; Refill: 1  -     Cholecalciferol (Vitamin D3) 1.25 MG (59785 UT) capsule; Take 1 capsule by mouth 1 (One) Time Per Week.  Dispense: 4 capsule; Refill: 10           I spent 30 minutes caring for Ernestina on this date of service. This time includes time spent by me in the following activities:reviewing tests, obtaining and/or reviewing a separately obtained history, performing a medically appropriate examination and/or evaluation , counseling and educating the patient/family/caregiver, ordering medications, tests, or procedures, and referring and communicating with other health care professionals        Follow Up   Return if symptoms worsen or fail to improve / RTC /ER.  Patient was given instructions and counseling regarding her condition or for health maintenance advice. Please see specific information pulled into the AVS if appropriate.           This document has been electronically signed by Edgar Lemus MD  October 3, 2023 10:53 EDT

## 2023-10-05 ENCOUNTER — PRIOR AUTHORIZATION (OUTPATIENT)
Dept: FAMILY MEDICINE CLINIC | Facility: CLINIC | Age: 63
End: 2023-10-05
Payer: MEDICARE

## 2023-10-05 NOTE — TELEPHONE ENCOUNTER
"Ernestina Wisdom Key: [id number] - PA Case ID: [id number] - Rx #: [id number] help? Call us at [phone]  Outcome  Deniedon October 4  Denied. Coverage is provided when the member has had a 30-day trial of ONE of the following medications: a) Serotonin-norepinephrine reuptake inhibitor (SNRI) medications such as duloxetine or venlafaxine, b) Tricyclic antidepressant medications such as amitriptyline, nortriptyline or clomipramine, OR c) Gabapentin. Coverage is provided when office visit notes have been submitted to the Plan that show the start and end dates of trial medications. In addition, there are no paid prescription fills to support the use of any trial medications. Standards Used in Making the Decision include: Sections 6 and 7 in your Evidence of Coverage (\"Prescription Drugs\" and \"What is Not Covered\"), the [facility] Policy for Medical Necessity Determinations, and Prior Authorization Pregabalin Pharmacy Criteria.  Drug  Pregabalin 25MG capsules  Form  [facility] Non-Medicare Electronic PA Form (2017 NCPDP)  Original Claim Info  75  "

## 2023-10-10 ENCOUNTER — TELEPHONE (OUTPATIENT)
Dept: FAMILY MEDICINE CLINIC | Facility: CLINIC | Age: 63
End: 2023-10-10
Payer: MEDICARE

## 2023-10-10 NOTE — TELEPHONE ENCOUNTER
"Patient calls in stating she has \"symptoms\" and wants to know what to do..    Right hand finger tips are numb.  Ton of bricks on chest.   Numb and painful in chest & Back.      Per Dr. Lemus, go to the ER..     Patient verbalized understanding and asked which ER. Informed patient whichever she wanted to go. But have them fax ER notes.    "

## 2023-10-11 ENCOUNTER — OFFICE VISIT (OUTPATIENT)
Dept: FAMILY MEDICINE CLINIC | Facility: CLINIC | Age: 63
End: 2023-10-11
Payer: MEDICARE

## 2023-10-11 VITALS
RESPIRATION RATE: 18 BRPM | SYSTOLIC BLOOD PRESSURE: 138 MMHG | TEMPERATURE: 96.4 F | HEIGHT: 63 IN | BODY MASS INDEX: 29.38 KG/M2 | WEIGHT: 165.8 LBS | DIASTOLIC BLOOD PRESSURE: 74 MMHG | HEART RATE: 77 BPM | OXYGEN SATURATION: 99 %

## 2023-10-11 DIAGNOSIS — E11.9 DIABETES MELLITUS WITHOUT COMPLICATION: ICD-10-CM

## 2023-10-11 DIAGNOSIS — E03.9 HYPOTHYROIDISM, UNSPECIFIED TYPE: ICD-10-CM

## 2023-10-11 DIAGNOSIS — R79.9 ABNORMAL FINDING OF BLOOD CHEMISTRY, UNSPECIFIED: ICD-10-CM

## 2023-10-11 DIAGNOSIS — R07.89 CHEST PAIN, ATYPICAL: Primary | ICD-10-CM

## 2023-10-11 DIAGNOSIS — G57.93 NEUROPATHY INVOLVING BOTH LOWER EXTREMITIES: ICD-10-CM

## 2023-10-11 DIAGNOSIS — I10 BENIGN HYPERTENSION: ICD-10-CM

## 2023-10-11 LAB
AMYLASE SERPL-CCNC: 60 U/L (ref 28–100)
EXPIRATION DATE: ABNORMAL
HBA1C MFR BLD: 10.3 % (ref 4.5–5.7)
LIPASE SERPL-CCNC: 45 U/L (ref 13–60)
Lab: ABNORMAL
TSH SERPL DL<=0.05 MIU/L-ACNC: 1.94 UIU/ML (ref 0.27–4.2)

## 2023-10-11 PROCEDURE — 83690 ASSAY OF LIPASE: CPT | Performed by: PSYCHOLOGIST

## 2023-10-11 PROCEDURE — 84443 ASSAY THYROID STIM HORMONE: CPT | Performed by: PSYCHOLOGIST

## 2023-10-11 PROCEDURE — 82150 ASSAY OF AMYLASE: CPT | Performed by: PSYCHOLOGIST

## 2023-10-11 RX ORDER — KETOROLAC TROMETHAMINE 30 MG/ML
30 INJECTION, SOLUTION INTRAMUSCULAR; INTRAVENOUS ONCE
Status: COMPLETED | OUTPATIENT
Start: 2023-10-11 | End: 2023-10-11

## 2023-10-11 RX ORDER — KETOROLAC TROMETHAMINE 30 MG/ML
30 INJECTION, SOLUTION INTRAMUSCULAR; INTRAVENOUS ONCE
Status: DISCONTINUED | OUTPATIENT
Start: 2023-10-11 | End: 2023-10-11

## 2023-10-11 RX ADMIN — KETOROLAC TROMETHAMINE 30 MG: 30 INJECTION, SOLUTION INTRAMUSCULAR; INTRAVENOUS at 15:41

## 2023-10-11 NOTE — ASSESSMENT & PLAN NOTE
Hypertension is worsening.  Dietary sodium restriction.  Weight loss.  Regular aerobic exercise.  Blood pressure will be reassessed at the next regular appointment.    REFER TO CARDIO FOR MORE WORK UP

## 2023-10-11 NOTE — ASSESSMENT & PLAN NOTE
Diabetes is worsening.   Reminded to bring in blood sugar diary at next visit.  Dietary recommendations for ADA diet.  Regular aerobic exercise.  Discussed sick day management.  Discussed foot care.  Reminded to get yearly retinal exam.  Diabetes will be reassessed in 3 months.

## 2023-10-11 NOTE — PROGRESS NOTES
Chief Complaint  ER Follow-up (Patient was seen at [facility] yesterday (10/10/2023) for Atypical Chest pain. All ER records are in chart./)    Subjective        Ernestina Wisdom presents to [facility]  C/o ER follow up for atypical chest pain:   Chest Pain   This is a new problem. The current episode started in the past 7 days. The onset quality is gradual. The problem occurs intermittently. The problem has been gradually worsening. The pain is present in the substernal region. The pain is moderate. The quality of the pain is described as squeezing. The pain radiates to the mid back. Associated symptoms include back pain, irregular heartbeat, palpitations and shortness of breath. Pertinent negatives include no abdominal pain or fever.   Her past medical history is significant for COPD, hypertension, MI and thyroid problem.   Hypertension  This is a chronic problem. The current episode started more than 1 year ago. The problem has been waxing and waning since onset. The problem is uncontrolled. Associated symptoms include chest pain, palpitations and shortness of breath. Risk factors for coronary artery disease include obesity, diabetes mellitus and dyslipidemia. Past treatments include lifestyle changes, beta blockers, diuretics and angiotensin blockers. Current antihypertension treatment includes lifestyle changes, beta blockers, diuretics and angiotensin blockers. The current treatment provides mild improvement. There are no compliance problems.  There is no history of angina, kidney disease or CAD/MI. Identifiable causes of hypertension include a thyroid problem. There is no history of chronic renal disease.   Hypothyroidism  This is a chronic problem. The current episode started more than 1 year ago. The problem occurs intermittently. The problem has been waxing and waning. Associated symptoms include chest pain and fatigue. Pertinent negatives include no abdominal pain or fever.  "The treatment provided moderate relief.   Diabetes  She presents for her follow-up diabetic visit. She has type 2 diabetes mellitus. Her disease course has been worsening. Associated symptoms include chest pain and fatigue. Symptoms are worsening. Risk factors for coronary artery disease include obesity, hypertension, dyslipidemia and post-menopausal. Current diabetic treatment includes diet and oral agent (monotherapy). She is compliant with treatment all of the time. She is following a generally healthy diet. She has not had a previous visit with a dietitian. She participates in exercise intermittently. An ACE inhibitor/angiotensin II receptor blocker is being taken. She does not see a podiatrist.Eye exam is current.       Objective   Vital Signs:  /74   Pulse 77   Temp 96.4 øF (35.8 øC) (Temporal)   Resp 18   Ht 160 cm (63\")   Wt 75.2 kg (165 lb 12.8 oz)   SpO2 99%   BMI 29.37 kg/mý   Estimated body mass index is 29.37 kg/mý as calculated from the following:    Height as of this encounter: 160 cm (63\").    Weight as of this encounter: 75.2 kg (165 lb 12.8 oz).        Physical Exam  Vitals and nursing note reviewed.   Constitutional:       Appearance: Normal appearance. She is obese.   HENT:      Head: Normocephalic.      Right Ear: Tympanic membrane normal.      Left Ear: Tympanic membrane normal.      Nose: Nose normal.      Mouth/Throat:      Mouth: Mucous membranes are moist.   Eyes:      Extraocular Movements: Extraocular movements intact.      Pupils: Pupils are equal, round, and reactive to light.   Cardiovascular:      Rate and Rhythm: Normal rate and regular rhythm.      Pulses: Normal pulses.      Heart sounds: Normal heart sounds.   Pulmonary:      Effort: Pulmonary effort is normal.      Breath sounds: Normal breath sounds.   Chest:      Chest wall: Tenderness present.   Abdominal:      General: Abdomen is protuberant. Bowel sounds are normal.      Palpations: Abdomen is soft. "   Musculoskeletal:         General: Normal range of motion.      Cervical back: Normal range of motion and neck supple.   Skin:     General: Skin is warm.      Capillary Refill: Capillary refill takes less than 2 seconds.   Neurological:      General: No focal deficit present.      Mental Status: She is alert and oriented to person, place, and time.   Psychiatric:         Mood and Affect: Mood normal.        Result Review :  The following data was reviewed by: Edgar Lemus MD on 10/11/2023:  Common labs          1/5/2023    11:34 7/11/2023    10:33 7/11/2023    10:52 10/11/2023    16:01   Common Labs   Glucose 191  193      BUN 23  16      Creatinine 0.94  0.94      Sodium 139  140      Potassium 4.4  5.0      Chloride 100  103      Calcium 9.9  9.9      Albumin 4.5  4.3      Total Bilirubin 0.5  0.5      Alkaline Phosphatase 84  77      AST (SGOT) 19  17      ALT (SGPT) 14  10      WBC 7.10  6.86      Hemoglobin 13.3  13.2      Hematocrit 41.2  40.8      Platelets 245  230      Total Cholesterol  137      Triglycerides  143      HDL Cholesterol  55      LDL Cholesterol   58      Hemoglobin A1C 10.60   9.1  10.3    Microalbumin, Urine  <1.2        Data reviewed : Radiologic studies 10/2/23 CT CHEST            Assessment and Plan   Diagnoses and all orders for this visit:    1. Chest pain, atypical (Primary)  Comments:  CONSIDER MORE CARDIAC WORK UP & POSSIBLE COSTOSCHONDRITIS  Orders:  -     Ambulatory Referral to Cardiology  -     ketorolac (TORADOL) injection 30 mg    2. Neuropathy involving both lower extremities  Comments:  SEVERE BACK PAIN WITH RADICULOP[ATHY D/P FUSION SURGERY 2018  Orders:  -     Ambulatory Referral to Neurosurgery  -     TSH  -     Discontinue: ketorolac (TORADOL) injection 30 mg  -     Amylase; Future  -     Lipase; Future  -     Lipase  -     Amylase    3. Benign hypertension  Assessment & Plan:  Hypertension is worsening.  Dietary sodium restriction.  Weight loss.  Regular  aerobic exercise.  Blood pressure will be reassessed at the next regular appointment.    REFER TO CARDIO FOR MORE WORK UP    Orders:  -     POC Glycosylated Hemoglobin (Hb A1C)  -     Ambulatory Referral to Cardiology  -     TSH  -     Discontinue: ketorolac (TORADOL) injection 30 mg  -     Amylase; Future  -     Lipase; Future  -     Lipase  -     Amylase    4. Diabetes mellitus without complication  Assessment & Plan:  Diabetes is worsening.   Reminded to bring in blood sugar diary at next visit.  Dietary recommendations for ADA diet.  Regular aerobic exercise.  Discussed sick day management.  Discussed foot care.  Reminded to get yearly retinal exam.  Diabetes will be reassessed in 3 months.    Orders:  -     TSH  -     Discontinue: ketorolac (TORADOL) injection 30 mg  -     Amylase; Future  -     Lipase; Future  -     Lipase  -     Amylase    5. Hypothyroidism, unspecified type  -     TSH  -     Discontinue: ketorolac (TORADOL) injection 30 mg  -     Amylase; Future  -     Lipase; Future  -     Lipase  -     Amylase    6. Abnormal finding of blood chemistry, unspecified  -     POC Glycosylated Hemoglobin (Hb A1C)    Other orders  -     diclofenac (VOLTAREN) 50 MG EC tablet; Take 1 tablet by mouth 2 (Two) Times a Day With Meals for 60 days.  Dispense: 60 tablet; Refill: 1  -     levothyroxine (Synthroid) 50 MCG tablet; Take 1 tablet by mouth Daily for 180 days. TAKE IN THE MORNING WHEN YOU WAKE UP ON AN EMPTY STOMACH  Dispense: 90 tablet; Refill: 1  -     Tirzepatide (Mounjaro) 2.5 MG/0.5ML solution pen-injector; Inject 0.5 mL under the skin into the appropriate area as directed 1 (One) Time Per Week for 4 doses.  Dispense: 0.5 mL; Refill: 3           I spent 30 minutes caring for Ernestina on this date of service. This time includes time spent by me in the following activities:reviewing tests, obtaining and/or reviewing a separately obtained history, performing a medically appropriate examination and/or evaluation ,  counseling and educating the patient/family/caregiver, ordering medications, tests, or procedures, and documenting information in the medical record     Refilled thyroid meds/ will start on mounjaro 2.5 mmg to help get her A!C to go down.       Follow Up   Return in about 3 months (around 1/11/2024) for Recheck, RTC FASTING LABS.  Patient was given instructions and counseling regarding her condition or for health maintenance advice. Please see specific information pulled into the AVS if appropriate.           This document has been electronically signed by Edgar Lemus MD  October 12, 2023 16:57 EDT

## 2023-10-12 RX ORDER — LEVOTHYROXINE SODIUM 0.05 MG/1
50 TABLET ORAL DAILY
Qty: 90 TABLET | Refills: 1 | Status: SHIPPED | OUTPATIENT
Start: 2023-10-12 | End: 2024-04-09

## 2023-10-12 RX ORDER — TIRZEPATIDE 2.5 MG/.5ML
2.5 INJECTION, SOLUTION SUBCUTANEOUS WEEKLY
Qty: 0.5 ML | Refills: 3 | Status: SHIPPED | OUTPATIENT
Start: 2023-10-12 | End: 2023-10-16

## 2023-10-16 ENCOUNTER — TELEPHONE (OUTPATIENT)
Dept: FAMILY MEDICINE CLINIC | Facility: CLINIC | Age: 63
End: 2023-10-16
Payer: MEDICARE

## 2023-10-16 ENCOUNTER — OFFICE VISIT (OUTPATIENT)
Dept: CARDIOLOGY | Facility: CLINIC | Age: 63
End: 2023-10-16
Payer: MEDICARE

## 2023-10-16 VITALS
HEIGHT: 63 IN | DIASTOLIC BLOOD PRESSURE: 81 MMHG | WEIGHT: 165 LBS | OXYGEN SATURATION: 99 % | HEART RATE: 77 BPM | BODY MASS INDEX: 29.23 KG/M2 | SYSTOLIC BLOOD PRESSURE: 184 MMHG

## 2023-10-16 DIAGNOSIS — R00.2 PALPITATIONS: Primary | ICD-10-CM

## 2023-10-16 DIAGNOSIS — E78.2 MIXED HYPERLIPIDEMIA: ICD-10-CM

## 2023-10-16 DIAGNOSIS — R07.2 PRECORDIAL PAIN: ICD-10-CM

## 2023-10-16 DIAGNOSIS — R06.02 SHORTNESS OF BREATH: ICD-10-CM

## 2023-10-16 DIAGNOSIS — I10 BENIGN HYPERTENSION: ICD-10-CM

## 2023-10-16 PROCEDURE — 3077F SYST BP >= 140 MM HG: CPT | Performed by: SPECIALIST

## 2023-10-16 PROCEDURE — 93000 ELECTROCARDIOGRAM COMPLETE: CPT | Performed by: SPECIALIST

## 2023-10-16 PROCEDURE — 99214 OFFICE O/P EST MOD 30 MIN: CPT | Performed by: SPECIALIST

## 2023-10-16 PROCEDURE — 3079F DIAST BP 80-89 MM HG: CPT | Performed by: SPECIALIST

## 2023-10-16 RX ORDER — ASPIRIN 81 MG/1
81 TABLET ORAL DAILY
COMMUNITY

## 2023-10-16 RX ORDER — CHOLECALCIFEROL (VITAMIN D3) 125 MCG
500 CAPSULE ORAL DAILY
COMMUNITY

## 2023-10-16 RX ORDER — VITAMIN E 268 MG
400 CAPSULE ORAL DAILY
COMMUNITY

## 2023-10-16 RX ORDER — OMEGA-3S/DHA/EPA/FISH OIL/D3 300MG-1000
400 CAPSULE ORAL DAILY
COMMUNITY

## 2023-10-16 RX ORDER — AMLODIPINE BESYLATE 5 MG/1
5 TABLET ORAL DAILY
Qty: 90 TABLET | Refills: 1 | Status: SHIPPED | OUTPATIENT
Start: 2023-10-16

## 2023-10-16 RX ORDER — CYCLOBENZAPRINE HCL 10 MG
10 TABLET ORAL 3 TIMES DAILY PRN
COMMUNITY

## 2023-10-16 NOTE — PROGRESS NOTES
Subjective   Initial consultation, chest pain, palpitations  Ernestina Wisdom is a 62 y.o. female who presents to day for Establish Care (Here for eval. For recent ER for c/p), Chest Pain, Shortness of Breath, Palpitations, and Hypertension.    CHIEF COMPLIANT  Chief Complaint   Patient presents with    Establish Care     Here for eval. For recent ER for c/p    Chest Pain    Shortness of Breath    Palpitations    Hypertension     Problem List:    Chest Pain  Shortness of breath  Palpitations  HTN  Hyperlipidemia  DM, type 2  Hypothyroidism  GERD    Problem List Items Addressed This Visit          Cardiac and Vasculature    Benign hypertension    Relevant Medications    amLODIPine (NORVASC) 5 MG tablet    Mixed hyperlipidemia    Precordial pain    Relevant Orders    Stress Test With Myocardial Perfusion One Day    Adult Transthoracic Echo Complete w/ Color, Spectral and Contrast if necessary per protocol    Palpitations - Primary    Relevant Orders    Adult Transthoracic Echo Complete w/ Color, Spectral and Contrast if necessary per protocol    Cardiac Event Monitor       Pulmonary and Pneumonias    Shortness of breath    Relevant Orders    Stress Test With Myocardial Perfusion One Day    Adult Transthoracic Echo Complete w/ Color, Spectral and Contrast if necessary per protocol       HPI  Last at least 6 months or so the patient is having frequent palpitation when she feels her heart is racing can last up to 2 minutes or so during which time she will get also some chest discomfort as well as shortness of breath she also gets quite short of breath and fatigued with exertion class II with intermittent chest pain she had some symptoms like that about 2 years ago when she had a stress test and echo in the monitor but nothing transpired but now she is getting more frequent episodes and she is concerned about that she never smoked cigarettes she does have hypertension history as well and has diabetes which is not well  controlled with also history of hyperlipidemia she is on rosuvastatin, family history wise her father had heart attacks in his late 50s                      PRIOR MEDS  Current Outpatient Medications on File Prior to Visit   Medication Sig Dispense Refill    aspirin 81 MG EC tablet Take 1 tablet by mouth Daily.      BIOTIN PO Take  by mouth Daily.      Cholecalciferol 10 MCG (400 UNIT) tablet Take 1 tablet by mouth Daily.      cyclobenzaprine (FLEXERIL) 10 MG tablet Take 1 tablet by mouth 3 (Three) Times a Day As Needed for Muscle Spasms.      fluticasone (FLONASE) 50 MCG/ACT nasal spray 2 sprays into the nostril(s) as directed by provider Daily for 7 days. 16 g 10    furosemide (LASIX) 20 MG tablet Take 1 tablet by mouth Daily for 60 days. (Patient taking differently: Take 1 tablet by mouth Daily As Needed.) 30 tablet 1    levothyroxine (Synthroid) 50 MCG tablet Take 1 tablet by mouth Daily for 180 days. TAKE IN THE MORNING WHEN YOU WAKE UP ON AN EMPTY STOMACH 90 tablet 1    losartan-hydrochlorothiazide (HYZAAR) 100-25 MG per tablet Take 1 tablet by mouth Daily With Breakfast for 180 days. 90 tablet 1    MELATONIN PO Take 10 mg by mouth At Night As Needed.      metFORMIN (GLUCOPHAGE) 500 MG tablet Take 2 tabs at breakfast . Take 1 tab at lunch and 2 tabs at dinner (Patient taking differently: Take 2 tablets by mouth 2 (Two) Times a Day With Meals.) 450 tablet 1    metoprolol succinate XL (TOPROL-XL) 25 MG 24 hr tablet Take 1 tablet by mouth Daily With Dinner for 180 days. 90 tablet 1    omeprazole (priLOSEC) 20 MG capsule Take 1 capsule by mouth Daily for 180 days. 90 capsule 1    rosuvastatin (Crestor) 5 MG tablet Take 1 tablet by mouth Daily With Dinner for 180 days. 90 tablet 1    vitamin B-12 (CYANOCOBALAMIN) 500 MCG tablet Take 1 tablet by mouth Daily.      VITAMIN E 400 UNIT capsule Take 1 capsule by mouth Daily.      nitroglycerin (NITROSTAT) 0.4 MG SL tablet 1 under the tongue as needed for angina, may  repeat q5mins for up three doses (Patient not taking: Reported on 10/16/2023) 30 tablet 5    [DISCONTINUED] Cholecalciferol (Vitamin D3) 1.25 MG (06392 UT) capsule Take 1 capsule by mouth 1 (One) Time Per Week. 4 capsule 10    [DISCONTINUED] diclofenac (VOLTAREN) 50 MG EC tablet Take 1 tablet by mouth 2 (Two) Times a Day With Meals for 60 days. 60 tablet 1    [DISCONTINUED] pregabalin (Lyrica) 25 MG capsule Take 1 capsule by mouth every night at bedtime for 30 days. 30 capsule 0    [DISCONTINUED] Tirzepatide (Mounjaro) 2.5 MG/0.5ML solution pen-injector Inject 0.5 mL under the skin into the appropriate area as directed 1 (One) Time Per Week for 4 doses. 0.5 mL 3     No current facility-administered medications on file prior to visit.       ALLERGIES  Empagliflozin, Sulfa antibiotics, Canagliflozin, Isosorbide, Atorvastatin, and Atorvastatin calcium    HISTORY  Past Medical History:   Diagnosis Date    Anxiety     Diabetes mellitus     GERD (gastroesophageal reflux disease)     Hyperlipidemia     Hypertension     Hypothyroidism     Leaky heart valve        Social History     Socioeconomic History    Marital status:    Tobacco Use    Smoking status: Never    Smokeless tobacco: Never   Vaping Use    Vaping Use: Never used   Substance and Sexual Activity    Alcohol use: Never    Drug use: Never    Sexual activity: Defer       Family History   Problem Relation Age of Onset    Hypertension Mother     Heart failure Mother     Hypertension Father     Cancer Father     Heart failure Father        Review of Systems   Constitutional:  Positive for fatigue.   HENT: Negative.     Eyes:  Positive for visual disturbance (glasses).   Respiratory:  Positive for shortness of breath.    Cardiovascular:  Positive for chest pain, palpitations and leg swelling (occas.).   Gastrointestinal:  Positive for diarrhea.   Endocrine: Negative.    Genitourinary: Negative.    Musculoskeletal: Negative.    Skin: Negative.   "  Allergic/Immunologic: Positive for environmental allergies.   Neurological:  Positive for dizziness. Negative for syncope.   Hematological:  Bruises/bleeds easily.   Psychiatric/Behavioral:  Positive for sleep disturbance.        Objective     VITALS: BP (!) 184/81 (BP Location: Left arm, Patient Position: Sitting)   Pulse 77   Ht 160 cm (62.99\")   Wt 74.8 kg (165 lb)   SpO2 99%   BMI 29.24 kg/m²     LABS:   Lab Results (most recent)       None            IMAGING:   XR Chest PA & Lateral    Result Date: 10/3/2023    Unremarkable radiographic appearance of the chest.   This report was finalized on 10/3/2023 8:05 AM by Dr. Prabhjot Saunders MD.        EXAM:  Physical Exam  Vitals reviewed.   Constitutional:       Appearance: She is well-developed.   HENT:      Head: Normocephalic and atraumatic.   Eyes:      Pupils: Pupils are equal, round, and reactive to light.   Neck:      Thyroid: No thyromegaly.      Vascular: No JVD.   Cardiovascular:      Rate and Rhythm: Normal rate and regular rhythm.      Heart sounds: Normal heart sounds. No murmur heard.     No friction rub. No gallop.      Comments: 2/6 ejection systolic murmur heard best at the aortic area radiated across the sternal border and carotids  Pulmonary:      Effort: Pulmonary effort is normal. No respiratory distress.      Breath sounds: Normal breath sounds. No stridor. No wheezing or rales.   Chest:      Chest wall: No tenderness.   Musculoskeletal:         General: No tenderness or deformity.      Cervical back: Neck supple.   Skin:     General: Skin is warm and dry.   Neurological:      Mental Status: She is alert and oriented to person, place, and time.      Cranial Nerves: No cranial nerve deficit.      Coordination: Coordination normal.         Procedure     ECG 12 Lead    Date/Time: 10/16/2023 1:35 PM  Performed by: Charito Gillespie MD    Authorized by: Charito Gillespie MD  Comparison: compared with previous ECG   Comments: EKG: Normal sinus rhythm " with left axis deviation, left anterior fascicular block, right bundle branch block, bifascicular block, left ventricular hypertrophy pattern, compared with EKG on March 9, 2021 no significant change               Assessment & Plan     Diagnoses and all orders for this visit:    1. Palpitations (Primary)  -     Adult Transthoracic Echo Complete w/ Color, Spectral and Contrast if necessary per protocol; Future  -     Cardiac Event Monitor; Future    2. Benign hypertension  -     amLODIPine (NORVASC) 5 MG tablet; Take 1 tablet by mouth Daily.  Dispense: 90 tablet; Refill: 1    3. Mixed hyperlipidemia    4. Precordial pain  -     Stress Test With Myocardial Perfusion One Day; Future  -     Adult Transthoracic Echo Complete w/ Color, Spectral and Contrast if necessary per protocol; Future    5. Shortness of breath  -     Stress Test With Myocardial Perfusion One Day; Future  -     Adult Transthoracic Echo Complete w/ Color, Spectral and Contrast if necessary per protocol; Future    Other orders  -     ECG 12 Lead    1.  I am quite concerned about her blood pressure which is quite high she says she did not take her medication this morning so I am going to also start her on amlodipine 5 mg/day advised her to take her medical medications regularly and monitor her blood pressure and call me if her blood pressure is elevated  2.  We will proceed with a stress testing for assessment of ischemia  3.  We will get an event monitor for assessment of arrhythmia we will also get an echocardiogram to assess cardiac function wall motion valve nephrology  4.  I reviewed her labs from her primary care physician and that showed poorly controlled diabetes with hemoglobin A1c of 10.6 lipids acceptable with LDL 58 HDL 55 and triglycerides 143  5  She has significant LVH on the EKG as well as a previous echocardiogram we will repeat the echo as mentioned above her LVH most likely related to poorly controlled hypertension    Return After  stress test.      Advance Care Planning   ACP discussion was held with the patient during this visit. Patient does not have an advance directive, declines further assistance.             MEDS ORDERED DURING VISIT:  New Medications Ordered This Visit   Medications    amLODIPine (NORVASC) 5 MG tablet     Sig: Take 1 tablet by mouth Daily.     Dispense:  90 tablet     Refill:  1       As always, Edgar Lemus MD  I appreciate very much the opportunity to participate in the cardiovascular care of your patients. Please do not hesitate to call me with any questions with regards to Ernestina Wisdom evaluation and management.         This document has been electronically signed by Charito Gillespie MD  October 16, 2023 13:41 EDT    This note is dictated utilizing voice recognition software.

## 2023-10-19 ENCOUNTER — TELEPHONE (OUTPATIENT)
Dept: CARDIOLOGY | Facility: CLINIC | Age: 63
End: 2023-10-19
Payer: MEDICARE

## 2023-10-19 NOTE — TELEPHONE ENCOUNTER
RECEIVED NOTIFICATION PT HAS NOT ACTIVATED HER MONITOR YET. S/W PT SHE STATED SHE TALKED WITH [employer]. THEY TOLD HER THEY WOULD PAY 80%. HER 2ND INSURANCE WILL BE IN EFFECT 1ST OF NOV. SHE IS TO CALL THEM BACK TO GET HER OUT OF POCKET COST. TOLD HER TO ASK THEM ABOUT FINANCIAL ASSISTANCE.

## 2023-10-26 ENCOUNTER — OFFICE VISIT (OUTPATIENT)
Dept: FAMILY MEDICINE CLINIC | Facility: CLINIC | Age: 63
End: 2023-10-26
Payer: MEDICARE

## 2023-10-26 VITALS
BODY MASS INDEX: 29.8 KG/M2 | TEMPERATURE: 97.1 F | SYSTOLIC BLOOD PRESSURE: 136 MMHG | OXYGEN SATURATION: 100 % | DIASTOLIC BLOOD PRESSURE: 72 MMHG | WEIGHT: 168.2 LBS | HEART RATE: 78 BPM | RESPIRATION RATE: 18 BRPM | HEIGHT: 63 IN

## 2023-10-26 DIAGNOSIS — E11.9 DIABETES MELLITUS WITHOUT COMPLICATION: ICD-10-CM

## 2023-10-26 DIAGNOSIS — N30.00 ACUTE CYSTITIS WITHOUT HEMATURIA: ICD-10-CM

## 2023-10-26 DIAGNOSIS — I10 BENIGN HYPERTENSION: ICD-10-CM

## 2023-10-26 DIAGNOSIS — M81.0 AGE-RELATED OSTEOPOROSIS WITHOUT CURRENT PATHOLOGICAL FRACTURE: ICD-10-CM

## 2023-10-26 DIAGNOSIS — Z13.820 ENCOUNTER FOR SCREENING FOR OSTEOPOROSIS: ICD-10-CM

## 2023-10-26 DIAGNOSIS — R30.0 BURNING WITH URINATION: Primary | ICD-10-CM

## 2023-10-26 LAB
BILIRUB BLD-MCNC: NEGATIVE MG/DL
CLARITY, POC: CLEAR
COLOR UR: YELLOW
GLUCOSE UR STRIP-MCNC: ABNORMAL MG/DL
KETONES UR QL: NEGATIVE
LEUKOCYTE EST, POC: NEGATIVE
NITRITE UR-MCNC: NEGATIVE MG/ML
PH UR: 5.5 [PH] (ref 5–8)
PROT UR STRIP-MCNC: NEGATIVE MG/DL
RBC # UR STRIP: ABNORMAL /UL
SP GR UR: 1.01 (ref 1–1.03)
UROBILINOGEN UR QL: NORMAL

## 2023-10-26 RX ORDER — NITROFURANTOIN 25; 75 MG/1; MG/1
100 CAPSULE ORAL 2 TIMES DAILY
Qty: 14 CAPSULE | Refills: 0 | Status: SHIPPED | OUTPATIENT
Start: 2023-10-26 | End: 2023-11-02

## 2023-10-26 RX ORDER — PHENAZOPYRIDINE HYDROCHLORIDE 200 MG/1
200 TABLET, FILM COATED ORAL 3 TIMES DAILY PRN
Qty: 6 TABLET | Refills: 0 | Status: SHIPPED | OUTPATIENT
Start: 2023-10-26 | End: 2023-10-28

## 2023-10-26 RX ORDER — VITAMIN E (DL,TOCOPHERYL ACET) 180 MG
1 CAPSULE ORAL DAILY
COMMUNITY

## 2023-10-26 RX ORDER — TIRZEPATIDE 2.5 MG/.5ML
2.5 INJECTION, SOLUTION SUBCUTANEOUS WEEKLY
Qty: 0.5 ML | Refills: 3 | Status: SHIPPED | OUTPATIENT
Start: 2023-10-26 | End: 2023-11-17

## 2023-10-26 NOTE — ASSESSMENT & PLAN NOTE
Diabetes is worsening.   Reminded to bring in blood sugar diary at next visit.  Dietary recommendations for ADA diet.  Regular aerobic exercise.  Discussed ways to avoid symptomatic hypoglycemia.  Discussed sick day management.  Discussed foot care.  Reminded to get yearly retinal exam.  Medication changes per orders.  Diabetes will be reassessed in 1 month.

## 2023-10-26 NOTE — PROGRESS NOTES
Chief Complaint  Burning with urination (Since 10/18/2023; Bladder pressure)    Subjective        Ernestina Wisdom presents to [facility]  C/O AN ACUTE CARE VISIT ASD HER PCP:   Difficulty Urinating  This is a new problem. The current episode started more than 1 year ago. The problem occurs intermittently. The problem has been gradually worsening. Associated symptoms include chills and fatigue. Pertinent negatives include no chest pain, fever, headaches, nausea or vomiting. She has tried acetaminophen for the symptoms. The treatment provided mild relief.   Diabetes  She presents for her follow-up diabetic visit. She has type 2 diabetes mellitus. No MedicAlert identification noted. Her disease course has been worsening. Pertinent negatives for hypoglycemia include no headaches. Associated symptoms include fatigue. Pertinent negatives for diabetes include no chest pain. Symptoms are worsening. Risk factors for coronary artery disease include obesity, male sex, hypertension and post-menopausal. Current diabetic treatment includes diet and oral agent (monotherapy). She is compliant with treatment all of the time. Her weight is stable. She is following a generally healthy diet. When asked about meal planning, she reported none. She has not had a previous visit with a dietitian. She participates in exercise daily. An ACE inhibitor/angiotensin II receptor blocker is being taken. She does not see a podiatrist.Eye exam is not current.   Hypertension  This is a chronic problem. The current episode started more than 1 year ago. The problem has been resolved since onset. The problem is controlled. Pertinent negatives include no chest pain, headaches, palpitations or shortness of breath. Agents associated with hypertension include thyroid hormones. Past treatments include lifestyle changes, diuretics, angiotensin blockers, beta blockers and calcium channel blockers. Current antihypertension treatment  "includes lifestyle changes, diuretics, calcium channel blockers, angiotensin blockers and beta blockers. The current treatment provides moderate improvement. There are no compliance problems.  There is no history of angina, kidney disease or CAD/MI. Identifiable causes of hypertension include a thyroid problem. There is no history of chronic renal disease.       Objective   Vital Signs:  /72 (BP Location: Left arm, Patient Position: Sitting, Cuff Size: Adult)   Pulse 78   Temp 97.1 °F (36.2 °C) (Temporal)   Resp 18   Ht 160 cm (62.99\")   Wt 76.3 kg (168 lb 3.2 oz)   SpO2 100%   BMI 29.80 kg/m²   Estimated body mass index is 29.8 kg/m² as calculated from the following:    Height as of this encounter: 160 cm (62.99\").    Weight as of this encounter: 76.3 kg (168 lb 3.2 oz).        Physical Exam  Vitals and nursing note reviewed.   Constitutional:       Appearance: Normal appearance. She is obese.   HENT:      Head: Normocephalic.      Right Ear: Tympanic membrane normal.      Left Ear: Tympanic membrane normal.      Nose: Nose normal.      Mouth/Throat:      Mouth: Mucous membranes are moist.   Eyes:      Extraocular Movements: Extraocular movements intact.      Pupils: Pupils are equal, round, and reactive to light.   Cardiovascular:      Rate and Rhythm: Normal rate and regular rhythm.      Pulses: Normal pulses.      Heart sounds: Normal heart sounds.   Pulmonary:      Effort: Pulmonary effort is normal.      Breath sounds: Normal breath sounds.   Abdominal:      General: Abdomen is protuberant. Bowel sounds are normal.      Palpations: Abdomen is soft.   Musculoskeletal:         General: Normal range of motion.      Cervical back: Normal range of motion and neck supple.   Skin:     General: Skin is warm.      Capillary Refill: Capillary refill takes less than 2 seconds.   Neurological:      General: No focal deficit present.      Mental Status: She is alert and oriented to person, place, and time. "   Psychiatric:         Mood and Affect: Mood normal.        Result Review :  The following data was reviewed by: Edgar Lemus MD on 10/26/2023:  Common labs          1/5/2023    11:34 7/11/2023    10:33 7/11/2023    10:52 10/11/2023    16:01   Common Labs   Glucose 191  193      BUN 23  16      Creatinine 0.94  0.94      Sodium 139  140      Potassium 4.4  5.0      Chloride 100  103      Calcium 9.9  9.9      Albumin 4.5  4.3      Total Bilirubin 0.5  0.5      Alkaline Phosphatase 84  77      AST (SGOT) 19  17      ALT (SGPT) 14  10      WBC 7.10  6.86      Hemoglobin 13.3  13.2      Hematocrit 41.2  40.8      Platelets 245  230      Total Cholesterol  137      Triglycerides  143      HDL Cholesterol  55      LDL Cholesterol   58      Hemoglobin A1C 10.60   9.1  10.3    Microalbumin, Urine  <1.2        Data reviewed : Radiologic studies 119699 CTA CHEST               Assessment and Plan   Diagnoses and all orders for this visit:    1. Burning with urination (Primary)  -     POCT urinalysis dipstick, manual    2. Acute cystitis without hematuria    3. Benign hypertension  Comments:  ADJUSTRED HOW SHEIS TAKING MEDS  Assessment & Plan:  Hypertension is improving with treatment.  Continue current treatment regimen.  Dietary sodium restriction.  Weight loss.  Regular aerobic exercise.  Blood pressure will be reassessed at the next regular appointment.      4. Diabetes mellitus without complication  Comments:  WILL NEED TO ADD MED HER  A1C STILL HIGH [facility]  Assessment & Plan:  Diabetes is worsening.   Reminded to bring in blood sugar diary at next visit.  Dietary recommendations for ADA diet.  Regular aerobic exercise.  Discussed ways to avoid symptomatic hypoglycemia.  Discussed sick day management.  Discussed foot care.  Reminded to get yearly retinal exam.  Medication changes per orders.  Diabetes will be reassessed in 1 month.      5. Encounter for screening for osteoporosis  -     DEXA  Bone Density Axial; Future    6. Age-related osteoporosis without current pathological fracture  -     DEXA Bone Density Axial; Future    Other orders  -     Tirzepatide (Mounjaro) 2.5 MG/0.5ML solution pen-injector; Inject 0.5 mL under the skin into the appropriate area as directed 1 (One) Time Per Week for 4 doses.  Dispense: 0.5 mL; Refill: 3  -     nitrofurantoin, macrocrystal-monohydrate, (Macrobid) 100 MG capsule; Take 1 capsule by mouth 2 (Two) Times a Day for 7 days.  Dispense: 14 capsule; Refill: 0  -     phenazopyridine (Pyridium) 200 MG tablet; Take 1 tablet by mouth 3 (Three) Times a Day As Needed for Bladder Spasms for up to 2 days.  Dispense: 6 tablet; Refill: 0           I spent 20 minutes caring for Ernestina on this date of service. This time includes time spent by me in the following activities:reviewing tests, obtaining and/or reviewing a separately obtained history, performing a medically appropriate examination and/or evaluation , counseling and educating the patient/family/caregiver, ordering medications, tests, or procedures, and documenting information in the medical record       Follow Up   Return in about 1 month (around 11/26/2023) for Recheck-- POORLY CONTROLLED DM  / HTN.  Patient was given instructions and counseling regarding her condition or for health maintenance advice. Please see specific information pulled into the AVS if appropriate.           This document has been electronically signed by Edgar Lemus MD  October 26, 2023 14:03 EDT

## 2023-11-02 RX ORDER — ROSUVASTATIN CALCIUM 5 MG/1
5 TABLET, COATED ORAL
Qty: 90 TABLET | Refills: 1 | OUTPATIENT
Start: 2023-11-02 | End: 2024-04-30

## 2023-11-09 ENCOUNTER — HOSPITAL ENCOUNTER (OUTPATIENT)
Dept: CARDIOLOGY | Facility: HOSPITAL | Age: 63
Discharge: HOME | End: 2023-11-09
Payer: MEDICARE

## 2023-11-09 DIAGNOSIS — R07.2 PRECORDIAL PAIN: ICD-10-CM

## 2023-11-09 DIAGNOSIS — R06.02 SHORTNESS OF BREATH: ICD-10-CM

## 2023-11-09 DIAGNOSIS — R00.2 PALPITATIONS: ICD-10-CM

## 2023-11-09 LAB
BH CV ECHO MEAS - ACS: 1.57 CM
BH CV ECHO MEAS - AO MAX PG: 9.9 MMHG
BH CV ECHO MEAS - AO MEAN PG: 5 MMHG
BH CV ECHO MEAS - AO ROOT DIAM: 2.7 CM
BH CV ECHO MEAS - AO V2 MAX: 157 CM/SEC
BH CV ECHO MEAS - AO V2 VTI: 36.7 CM
BH CV ECHO MEAS - EDV(CUBED): 40.4 ML
BH CV ECHO MEAS - EDV(MOD-SP4): 35.9 ML
BH CV ECHO MEAS - EF(MOD-SP4): 44.6 %
BH CV ECHO MEAS - EF_3D-VOL: 63 %
BH CV ECHO MEAS - ESV(CUBED): 10.2 ML
BH CV ECHO MEAS - ESV(MOD-SP4): 19.9 ML
BH CV ECHO MEAS - FS: 36.7 %
BH CV ECHO MEAS - IVS/LVPW: 1.06 CM
BH CV ECHO MEAS - IVSD: 1.34 CM
BH CV ECHO MEAS - LA DIMENSION: 3.5 CM
BH CV ECHO MEAS - LAT PEAK E' VEL: 10.8 CM/SEC
BH CV ECHO MEAS - LV DIASTOLIC VOL/BSA (35-75): 20.2 CM2
BH CV ECHO MEAS - LV MASS(C)D: 149.4 GRAMS
BH CV ECHO MEAS - LV SYSTOLIC VOL/BSA (12-30): 11.2 CM2
BH CV ECHO MEAS - LVIDD: 3.4 CM
BH CV ECHO MEAS - LVIDS: 2.17 CM
BH CV ECHO MEAS - LVPWD: 1.26 CM
BH CV ECHO MEAS - MED PEAK E' VEL: 6.6 CM/SEC
BH CV ECHO MEAS - MV A MAX VEL: 82.7 CM/SEC
BH CV ECHO MEAS - MV DEC TIME: 0.23 SEC
BH CV ECHO MEAS - MV E MAX VEL: 94.7 CM/SEC
BH CV ECHO MEAS - MV E/A: 1.15
BH CV ECHO MEAS - RAP SYSTOLE: 10 MMHG
BH CV ECHO MEAS - RVSP: 33.1 MMHG
BH CV ECHO MEAS - SI(MOD-SP4): 9 ML/M2
BH CV ECHO MEAS - SV(MOD-SP4): 16 ML
BH CV ECHO MEAS - TR MAX PG: 23.1 MMHG
BH CV ECHO MEAS - TR MAX VEL: 240.3 CM/SEC
BH CV ECHO MEASUREMENTS AVERAGE E/E' RATIO: 10.89
BH CV REST NUCLEAR ISOTOPE DOSE: 10 MCI
BH CV STRESS COMMENTS STAGE 1: NORMAL
BH CV STRESS DOSE REGADENOSON STAGE 1: 0.4
BH CV STRESS DURATION MIN STAGE 1: 0
BH CV STRESS DURATION SEC STAGE 1: 10
BH CV STRESS NUCLEAR ISOTOPE DOSE: 30 MCI
BH CV STRESS PROTOCOL 1: NORMAL
BH CV STRESS RECOVERY BP: NORMAL MMHG
BH CV STRESS RECOVERY HR: 89 BPM
BH CV STRESS STAGE 1: 1
BH CV XLRA - RV BASE: 2.9 CM
BH CV XLRA - RV LENGTH: 6.3 CM
BH CV XLRA - RV MID: 2 CM
LEFT ATRIUM VOLUME INDEX: 19 ML/M2
LV EF NUC BP: 79 %
MAXIMAL PREDICTED HEART RATE: 155 BPM
PERCENT MAX PREDICTED HR: 73.55 %
STRESS BASELINE BP: NORMAL MMHG
STRESS BASELINE HR: 58 BPM
STRESS PERCENT HR: 87 %
STRESS POST PEAK BP: NORMAL MMHG
STRESS POST PEAK HR: 114 BPM
STRESS TARGET HR: 132 BPM

## 2023-11-09 PROCEDURE — 0 TECHNETIUM SESTAMIBI: Performed by: SPECIALIST

## 2023-11-09 PROCEDURE — 25010000002 REGADENOSON 0.4 MG/5ML SOLUTION: Performed by: SPECIALIST

## 2023-11-09 PROCEDURE — 93017 CV STRESS TEST TRACING ONLY: CPT

## 2023-11-09 PROCEDURE — 93306 TTE W/DOPPLER COMPLETE: CPT | Performed by: SPECIALIST

## 2023-11-09 PROCEDURE — 78452 HT MUSCLE IMAGE SPECT MULT: CPT

## 2023-11-09 PROCEDURE — A9500 TC99M SESTAMIBI: HCPCS | Performed by: SPECIALIST

## 2023-11-09 PROCEDURE — 93306 TTE W/DOPPLER COMPLETE: CPT

## 2023-11-09 RX ORDER — REGADENOSON 0.08 MG/ML
0.4 INJECTION, SOLUTION INTRAVENOUS
Status: COMPLETED | OUTPATIENT
Start: 2023-11-09 | End: 2023-11-09

## 2023-11-09 RX ADMIN — TECHNETIUM TC 99M SESTAMIBI 1 DOSE: 1 INJECTION INTRAVENOUS at 09:36

## 2023-11-09 RX ADMIN — REGADENOSON 0.4 MG: 0.08 INJECTION, SOLUTION INTRAVENOUS at 09:36

## 2023-11-09 RX ADMIN — TECHNETIUM TC 99M SESTAMIBI 1 DOSE: 1 INJECTION INTRAVENOUS at 08:04

## 2023-11-15 RX ORDER — LOSARTAN POTASSIUM AND HYDROCHLOROTHIAZIDE 25; 100 MG/1; MG/1
1 TABLET ORAL
Qty: 83 TABLET | Refills: 0 | Status: SHIPPED | OUTPATIENT
Start: 2023-11-15

## 2023-11-16 ENCOUNTER — TELEPHONE (OUTPATIENT)
Dept: CARDIOLOGY | Facility: CLINIC | Age: 63
End: 2023-11-16
Payer: MEDICARE

## 2023-11-16 NOTE — TELEPHONE ENCOUNTER
Pt stated her test results were in my chart but she doesn't have access, she is asking if someone could call her, let pt know I would send a message to the nurse. Thank you

## 2023-11-27 ENCOUNTER — OFFICE VISIT (OUTPATIENT)
Dept: FAMILY MEDICINE CLINIC | Facility: CLINIC | Age: 63
End: 2023-11-27
Payer: MEDICARE

## 2023-11-27 VITALS
WEIGHT: 165 LBS | OXYGEN SATURATION: 98 % | BODY MASS INDEX: 29.23 KG/M2 | DIASTOLIC BLOOD PRESSURE: 76 MMHG | TEMPERATURE: 97.7 F | HEART RATE: 74 BPM | SYSTOLIC BLOOD PRESSURE: 132 MMHG | HEIGHT: 63 IN | RESPIRATION RATE: 18 BRPM

## 2023-11-27 DIAGNOSIS — J01.01 ACUTE RECURRENT MAXILLARY SINUSITIS: ICD-10-CM

## 2023-11-27 DIAGNOSIS — M54.50 SEVERE LOW BACK PAIN: ICD-10-CM

## 2023-11-27 DIAGNOSIS — Z00.00 ENCOUNTER FOR SUBSEQUENT ANNUAL WELLNESS VISIT (AWV) IN MEDICARE PATIENT: Primary | ICD-10-CM

## 2023-11-27 PROCEDURE — 1170F FXNL STATUS ASSESSED: CPT | Performed by: PSYCHOLOGIST

## 2023-11-27 PROCEDURE — 3075F SYST BP GE 130 - 139MM HG: CPT | Performed by: PSYCHOLOGIST

## 2023-11-27 PROCEDURE — 1160F RVW MEDS BY RX/DR IN RCRD: CPT | Performed by: PSYCHOLOGIST

## 2023-11-27 PROCEDURE — 3046F HEMOGLOBIN A1C LEVEL >9.0%: CPT | Performed by: PSYCHOLOGIST

## 2023-11-27 PROCEDURE — 3078F DIAST BP <80 MM HG: CPT | Performed by: PSYCHOLOGIST

## 2023-11-27 PROCEDURE — 1159F MED LIST DOCD IN RCRD: CPT | Performed by: PSYCHOLOGIST

## 2023-11-27 RX ORDER — CODEINE PHOSPHATE/GUAIFENESIN 10-100MG/5
5 LIQUID (ML) ORAL 3 TIMES DAILY PRN
Qty: 150 ML | Refills: 0 | Status: SHIPPED | OUTPATIENT
Start: 2023-11-27 | End: 2023-12-07

## 2023-11-27 RX ORDER — AZITHROMYCIN 250 MG/1
TABLET, FILM COATED ORAL
Qty: 6 TABLET | Refills: 1 | Status: SHIPPED | OUTPATIENT
Start: 2023-11-27

## 2023-11-27 RX ORDER — DEXAMETHASONE SODIUM PHOSPHATE 4 MG/ML
8 INJECTION, SOLUTION INTRA-ARTICULAR; INTRALESIONAL; INTRAMUSCULAR; INTRAVENOUS; SOFT TISSUE ONCE
Status: COMPLETED | OUTPATIENT
Start: 2023-11-27 | End: 2023-11-27

## 2023-11-27 RX ADMIN — DEXAMETHASONE SODIUM PHOSPHATE 8 MG: 4 INJECTION, SOLUTION INTRA-ARTICULAR; INTRALESIONAL; INTRAMUSCULAR; INTRAVENOUS; SOFT TISSUE at 15:02

## 2023-11-27 NOTE — ADDENDUM NOTE
Addended by: CARLINE CHAPMAN on: 11/27/2023 02:56 PM     Modules accepted: Orders, Level of Service

## 2023-11-27 NOTE — PROGRESS NOTES
The ABCs of the Annual Wellness Visit  Subsequent Medicare Wellness Visit    Subjective      Ernestina Wisdom is a 63 y.o. female who presents for a Subsequent Medicare Wellness Visit.    The following portions of the patient's history were reviewed and   updated as appropriate: allergies, current medications, past family history, past medical history, past social history, past surgical history, and problem list.    Compared to one year ago, the patient feels her physical   health is the same.    Compared to one year ago, the patient feels her mental   health is the same.    Recent Hospitalizations:  She was not admitted to the hospital during the last year.       Current Medical Providers:  Patient Care Team:  Edgar Lemus MD as PCP - General (Urgent Care)    Outpatient Medications Prior to Visit   Medication Sig Dispense Refill    amLODIPine (NORVASC) 5 MG tablet Take 1 tablet by mouth Daily. 90 tablet 1    aspirin 81 MG EC tablet Take 1 tablet by mouth Daily.      cyclobenzaprine (FLEXERIL) 10 MG tablet Take 1 tablet by mouth 3 (Three) Times a Day As Needed for Muscle Spasms.      diclofenac (VOLTAREN) 50 MG EC tablet Take 1 tablet by mouth 2 (Two) Times a Day With Meals.      fluticasone (FLONASE) 50 MCG/ACT nasal spray 2 sprays into the nostril(s) as directed by provider Daily for 7 days. 16 g 10    furosemide (LASIX) 20 MG tablet Take 1 tablet by mouth Daily for 60 days. (Patient taking differently: Take 1 tablet by mouth Daily As Needed.) 30 tablet 1    levothyroxine (Synthroid) 50 MCG tablet Take 1 tablet by mouth Daily for 180 days. TAKE IN THE MORNING WHEN YOU WAKE UP ON AN EMPTY STOMACH 90 tablet 1    losartan-hydrochlorothiazide (HYZAAR) 100-25 MG per tablet Take 1 tablet by mouth once daily with breakfast 83 tablet 0    MELATONIN PO Take 10 mg by mouth At Night As Needed.      metFORMIN (GLUCOPHAGE) 500 MG tablet Take 2 tabs at breakfast . Take 1 tab at lunch and 2 tabs at dinner (Patient taking  differently: Take 2 tablets by mouth 2 (Two) Times a Day With Meals.) 450 tablet 1    metoprolol succinate XL (TOPROL-XL) 25 MG 24 hr tablet Take 1 tablet by mouth Daily With Dinner for 180 days. 90 tablet 1    Multiple Vitamins-Minerals (Hair Skin Nails) capsule Take 1 capsule by mouth Daily. 5000mgm with Biotin      nitroglycerin (NITROSTAT) 0.4 MG SL tablet 1 under the tongue as needed for angina, may repeat q5mins for up three doses 30 tablet 5    omeprazole (priLOSEC) 20 MG capsule Take 1 capsule by mouth Daily for 180 days. 90 capsule 1    rosuvastatin (Crestor) 5 MG tablet Take 1 tablet by mouth Daily With Dinner for 180 days. 90 tablet 1    vitamin B-12 (CYANOCOBALAMIN) 500 MCG tablet Take 1 tablet by mouth Daily.      VITAMIN E 400 UNIT capsule Take 1 capsule by mouth Daily.       No facility-administered medications prior to visit.       No opioid medication identified on active medication list. I have reviewed chart for other potential  high risk medication/s and harmful drug interactions in the elderly.        Aspirin is on active medication list. Aspirin use is indicated based on review of current medical condition/s. Pros and cons of this therapy have been discussed today. Benefits of this medication outweigh potential harm.  Patient has been encouraged to continue taking this medication.  .      Patient Active Problem List   Diagnosis    Benign hypertension    Cobalamin deficiency    Diabetes mellitus without complication    Mixed hyperlipidemia    Vitamin D deficiency    Leg cramping    Localized swelling of lower extremity    Neuropathy involving both lower extremities    Restless leg syndrome    Gastroesophageal reflux disease without esophagitis    Hypothyroid    Diverticulitis    Precordial pain    Shortness of breath    Palpitations     Advance Care Planning   Advance Care Planning     Advance Directive is on file.  ACP discussion was held with the patient during this visit. She will d/w her son  "and get one done and send us a copy on file.     Objective    Vitals:    23 1337   BP: 132/76   Pulse: 74   Resp: 18   Temp: 97.7 °F (36.5 °C)   TempSrc: Temporal   SpO2: 98%   Weight: 74.8 kg (165 lb)   Height: 160 cm (63\")     Estimated body mass index is 29.23 kg/m² as calculated from the following:    Height as of this encounter: 160 cm (63\").    Weight as of this encounter: 74.8 kg (165 lb).           Does the patient have evidence of cognitive impairment?   MINI MENTAL STATUS EXAM ACE III SCORE:  30  No    Lab Results   Component Value Date    HGBA1C 10.3 (A) 10/11/2023          HEALTH RISK ASSESSMENT    Smoking Status:  Social History     Tobacco Use   Smoking Status Never   Smokeless Tobacco Never     Alcohol Consumption:  Social History     Substance and Sexual Activity   Alcohol Use Never     Fall Risk Screen:    WALKER Fall Risk Assessment was completed, and patient is at HIGH risk for falls. Assessment completed on:2023    Depression Screenin/27/2023     1:37 PM   PHQ-2/PHQ-9 Depression Screening   Little Interest or Pleasure in Doing Things 0-->not at all   Feeling Down, Depressed or Hopeless 0-->not at all   Trouble Falling or Staying Asleep, or Sleeping Too Much 1-->several days   Feeling Tired or Having Little Energy 2-->more than half the days   Poor Appetite or Overeating 3-->nearly every day   Feeling Bad about Yourself - or that You are a Failure or Have Let Yourself or Your Family Down 2-->more than half the days   Trouble Concentrating on Things, Such as Reading the Newspaper or Watching Television 2-->more than half the days   Moving or Speaking So Slowly that Other People Could Have Noticed? Or the Opposite - Being So Fidgety 1-->several days   Thoughts that You Would be Better Off Dead or of Hurting Yourself in Some Way 0-->not at all   PHQ-9: Brief Depression Severity Measure Score 11       Health Habits and Functional and Cognitive Screenin/27/2023     1:00 " PM   Functional & Cognitive Status   Do you have difficulty preparing food and eating? No   Do you have difficulty bathing yourself, getting dressed or grooming yourself? No   Do you have difficulty using the toilet? No   Do you have difficulty moving around from place to place? No   Do you have trouble with steps or getting out of a bed or a chair? Yes   Current Diet Well Balanced Diet   Dental Exam Not up to date   Eye Exam Up to date   Exercise (times per week) 0 times per week   Current Exercises Include No Regular Exercise   Do you need help using the phone?  No   Are you deaf or do you have serious difficulty hearing?  No   Do you need help to go to places out of walking distance? No   Do you need help shopping? No   Do you need help preparing meals?  No   Do you need help with housework?  No   Do you need help with laundry? No   Do you need help taking your medications? No   Do you need help managing money? No   Do you ever drive or ride in a car without wearing a seat belt? No   Have you felt unusual stress, anger or loneliness in the last month? Yes   Who do you live with? Alone   If you need help, do you have trouble finding someone available to you? No   Have you been bothered in the last four weeks by sexual problems? No   Do you have difficulty concentrating, remembering or making decisions? No       Age-appropriate Screening Schedule:  Refer to the list below for future screening recommendations based on patient's age, sex and/or medical conditions. Orders for these recommended tests are listed in the plan section. The patient has been provided with a written plan.    Health Maintenance   Topic Date Due    DXA SCAN  12/11/2023 (Originally 7/14/2023)    DIABETIC EYE EXAM  12/14/2023 (Originally 10/17/2023)    Pneumococcal Vaccine 65+ (1 - PCV) 12/30/2023 (Originally 10/23/1966)    DIABETIC FOOT EXAM  01/11/2024 (Originally 7/13/2023)    ZOSTER VACCINE (1 of 2) 08/01/2024 (Originally 10/23/2008)    BMI  FOLLOWUP  01/05/2024    HEMOGLOBIN A1C  04/11/2024    LIPID PANEL  07/11/2024    URINE MICROALBUMIN  07/11/2024    ANNUAL WELLNESS VISIT  11/27/2024    TDAP/TD VACCINES (2 - Td or Tdap) 01/01/2025    COLORECTAL CANCER SCREENING  08/01/2025    MAMMOGRAM  10/02/2025    HEPATITIS C SCREENING  Completed    INFLUENZA VACCINE  Completed    COVID-19 Vaccine  Discontinued                  CMS Preventative Services Quick Reference  Risk Factors Identified During Encounter:    Chronic Pain: Natural history and expected course discussed. Questions answered.  Fall Risk-High or Moderate: Discussed Fall Prevention in the home  Glaucoma or Family History of Glaucoma:  Ophthalmology Appointment Recommended  Hearing Problem:  HEARING SCREENING TEST RECOMMENDED   Immunizations Discussed/Encouraged: Tdap, Hepatitis A Vaccine/Series, Hepatitis B Vaccine/Series, Influenza, Pneumococcal 23, Prevnar 20 (Pneumococcal 20-valent conjugate), Vaxneuvance (Pneumococcal 15-valent conjugate), Shingrix, COVID19, and RSV (Respiratory Syncytial Virus)  Polypharmacy: Medication List reviewed  Dental Screening Recommended  Vision Screening Recommended    The above risks/problems have been discussed with the patient.  Pertinent information has been shared with the patient in the After Visit Summary.    Diagnoses and all orders for this visit:    1. Encounter for subsequent annual wellness visit (AWV) in Medicare patient (Primary)    2. Severe low back pain  -     Ambulatory Referral to Neurosurgery    3. Acute recurrent maxillary sinusitis        Follow Up:   Next Medicare Wellness visit to be scheduled in 1 year.      An After Visit Summary and PPPS were made available to the patient.          This document has been electronically signed by Edgar Lemus MD  November 27, 2023 14:53 EST

## 2023-11-28 ENCOUNTER — TELEPHONE (OUTPATIENT)
Dept: FAMILY MEDICINE CLINIC | Facility: CLINIC | Age: 63
End: 2023-11-28
Payer: MEDICARE

## 2023-11-28 NOTE — TELEPHONE ENCOUNTER
STATED THAT SHE HAD CALLED INS TO INQUIRE WHAT MEDICINE THEY WOULD COVER (WITHOUT HER HAVING A HUGE COPAY), AND SHE SAID THAT IT WAS TRULICITY.  SO SHE WOULD GREATLY APPRECIATE IF YOU COULD PLEASE SEND THIS IN FOR HER.  THANK YOU.

## 2023-11-29 NOTE — TELEPHONE ENCOUNTER
Called and informed patient that Trulicity was sent in to pharmacy, per Dr. Lemus. Patient verbalized understanding and has no further questions at this time.

## 2023-12-04 DIAGNOSIS — G57.93 NEUROPATHY INVOLVING BOTH LOWER EXTREMITIES: ICD-10-CM

## 2024-01-04 RX ORDER — DULAGLUTIDE 0.75 MG/.5ML
INJECTION, SOLUTION SUBCUTANEOUS
Qty: 4 ML | Refills: 0 | Status: SHIPPED | OUTPATIENT
Start: 2024-01-04

## 2024-01-08 ENCOUNTER — PRIOR AUTHORIZATION (OUTPATIENT)
Dept: FAMILY MEDICINE CLINIC | Facility: CLINIC | Age: 64
End: 2024-01-08
Payer: MEDICARE

## 2024-01-08 NOTE — TELEPHONE ENCOUNTER
Prior authorization request received for Trulicity 0.75/0.5 injection. Authorization submitted through cover my med's. Status pending.

## 2024-01-09 NOTE — TELEPHONE ENCOUNTER
Ernestina Wisdom (Key: [id number])  PA Case ID #: [id number]  Need Help? Call us at [phone]  Outcome  Approved on January 8  PA Case: [id number], Status: Approved, Coverage Starts on: 1/8/2024 12:00:00 AM, Coverage Ends on: 1/7/2025 12:00:00 AM.  Authorization Expiration Date: 1/6/2025  Drug  Trulicity 0.75MG/0.5ML pen-injectors  ePA cloud logo  Form  [employer] Electronic PA Form (2017 NCPDP)

## 2024-01-11 ENCOUNTER — OFFICE VISIT (OUTPATIENT)
Dept: FAMILY MEDICINE CLINIC | Facility: CLINIC | Age: 64
End: 2024-01-11
Payer: MEDICARE

## 2024-01-11 VITALS
HEIGHT: 63 IN | RESPIRATION RATE: 18 BRPM | DIASTOLIC BLOOD PRESSURE: 74 MMHG | SYSTOLIC BLOOD PRESSURE: 128 MMHG | WEIGHT: 161 LBS | TEMPERATURE: 96.9 F | BODY MASS INDEX: 28.53 KG/M2 | HEART RATE: 75 BPM | OXYGEN SATURATION: 98 %

## 2024-01-11 DIAGNOSIS — I10 BENIGN HYPERTENSION: ICD-10-CM

## 2024-01-11 DIAGNOSIS — K21.9 GASTROESOPHAGEAL REFLUX DISEASE WITHOUT ESOPHAGITIS: ICD-10-CM

## 2024-01-11 DIAGNOSIS — Z23 NEED FOR PNEUMOCOCCAL VACCINE: ICD-10-CM

## 2024-01-11 DIAGNOSIS — R25.2 LEG CRAMPING: ICD-10-CM

## 2024-01-11 DIAGNOSIS — R30.0 DYSURIA: Primary | ICD-10-CM

## 2024-01-11 DIAGNOSIS — E11.9 DIABETES MELLITUS WITHOUT COMPLICATION: ICD-10-CM

## 2024-01-11 DIAGNOSIS — E78.2 MIXED HYPERLIPIDEMIA: ICD-10-CM

## 2024-01-11 DIAGNOSIS — E03.9 HYPOTHYROIDISM, UNSPECIFIED TYPE: ICD-10-CM

## 2024-01-11 DIAGNOSIS — R00.2 PALPITATIONS: ICD-10-CM

## 2024-01-11 LAB
BILIRUB BLD-MCNC: NEGATIVE MG/DL
CLARITY, POC: CLEAR
COLOR UR: YELLOW
EXPIRATION DATE: 1
EXPIRATION DATE: ABNORMAL
GLUCOSE UR STRIP-MCNC: ABNORMAL MG/DL
HBA1C MFR BLD: 10 % (ref 4.5–5.7)
KETONES UR QL: NEGATIVE
LEUKOCYTE EST, POC: ABNORMAL
Lab: ABNORMAL
Lab: ABNORMAL
NITRITE UR-MCNC: NEGATIVE MG/ML
PH UR: 7 [PH] (ref 5–8)
PROT UR STRIP-MCNC: NEGATIVE MG/DL
RBC # UR STRIP: NEGATIVE /UL
SP GR UR: 1.01 (ref 1–1.03)
TSH SERPL DL<=0.05 MIU/L-ACNC: 1.25 UIU/ML (ref 0.27–4.2)
UROBILINOGEN UR QL: NORMAL

## 2024-01-11 PROCEDURE — 84443 ASSAY THYROID STIM HORMONE: CPT | Performed by: PSYCHOLOGIST

## 2024-01-11 RX ORDER — LEVOTHYROXINE SODIUM 0.05 MG/1
TABLET ORAL
Qty: 90 TABLET | Refills: 0 | Status: SHIPPED | OUTPATIENT
Start: 2024-01-11

## 2024-01-11 RX ORDER — OMEPRAZOLE 20 MG/1
20 CAPSULE, DELAYED RELEASE ORAL DAILY
Qty: 90 CAPSULE | Refills: 1 | Status: SHIPPED | OUTPATIENT
Start: 2024-01-11 | End: 2024-07-09

## 2024-01-11 RX ORDER — ROPINIROLE 0.5 MG/1
0.5 TABLET, FILM COATED ORAL NIGHTLY
Qty: 14 TABLET | Refills: 0 | Status: SHIPPED | OUTPATIENT
Start: 2024-01-11 | End: 2024-01-25

## 2024-01-11 RX ORDER — NITROFURANTOIN 25; 75 MG/1; MG/1
100 CAPSULE ORAL 2 TIMES DAILY
Qty: 14 CAPSULE | Refills: 0 | Status: SHIPPED | OUTPATIENT
Start: 2024-01-11 | End: 2024-01-18

## 2024-01-11 RX ORDER — METOPROLOL SUCCINATE 25 MG/1
25 TABLET, EXTENDED RELEASE ORAL
Qty: 90 TABLET | Refills: 1 | Status: SHIPPED | OUTPATIENT
Start: 2024-01-11 | End: 2024-07-09

## 2024-01-11 RX ORDER — AMLODIPINE BESYLATE 5 MG/1
5 TABLET ORAL DAILY
Qty: 90 TABLET | Refills: 1 | Status: SHIPPED | OUTPATIENT
Start: 2024-01-11

## 2024-01-11 RX ORDER — PHENAZOPYRIDINE HYDROCHLORIDE 200 MG/1
200 TABLET, FILM COATED ORAL 3 TIMES DAILY PRN
Qty: 6 TABLET | Refills: 0 | Status: SHIPPED | OUTPATIENT
Start: 2024-01-11 | End: 2024-01-13

## 2024-01-11 NOTE — ASSESSMENT & PLAN NOTE
Diabetes is worsening.   Reminded to bring in blood sugar diary at next visit.  Dietary recommendations for ADA diet.  Regular aerobic exercise.  Discussed ways to avoid symptomatic hypoglycemia.  Discussed sick day management.  Discussed foot care.  Reminded to get yearly retinal exam.  Medication changes per orders.  Diabetes will be reassessed in 3 months.

## 2024-01-11 NOTE — PROGRESS NOTES
Chief Complaint  Follow-up (Cont. Care of DM, HTN/HCM: PCV 20, Diabetic Eye exam)    Subjective        Ernestina Wisdom presents to [facility]  C/o follow up chronic illness 2/. Labs 3. Med refill  Hypertension  This is a chronic problem. The current episode started more than 1 year ago. The problem has been resolved since onset. The problem is controlled. Pertinent negatives include no chest pain, headaches, palpitations or shortness of breath. Agents associated with hypertension include thyroid hormones. Risk factors for coronary artery disease include post-menopausal state, obesity, diabetes mellitus and dyslipidemia. Past treatments include lifestyle changes, diuretics, angiotensin blockers and beta blockers. Current antihypertension treatment includes lifestyle changes, diuretics, angiotensin blockers and beta blockers. The current treatment provides moderate improvement. There are no compliance problems.  There is no history of angina, kidney disease or CAD/MI. Identifiable causes of hypertension include a thyroid problem. There is no history of chronic renal disease.   Hyperlipidemia  This is a chronic problem. The current episode started more than 1 year ago. Exacerbating diseases include hypothyroidism. She has no history of chronic renal disease. Associated symptoms include leg pain. Pertinent negatives include no chest pain or shortness of breath. Current antihyperlipidemic treatment includes statins. The current treatment provides moderate improvement of lipids. There are no compliance problems.  Risk factors for coronary artery disease include obesity, hypertension, post-menopausal and diabetes mellitus.   Diabetes  She presents for her follow-up diabetic visit. She has type 2 diabetes mellitus. No MedicAlert identification noted. Her disease course has been worsening. Pertinent negatives for hypoglycemia include no headaches. Pertinent negatives for diabetes include no  "chest pain. Symptoms are worsening. Risk factors for coronary artery disease include obesity, hypertension and dyslipidemia. Current diabetic treatment includes oral agent (monotherapy) and diet. She is compliant with treatment all of the time. She is following a generally healthy diet. When asked about meal planning, she reported none. She has not had a previous visit with a dietitian. She participates in exercise intermittently. An ACE inhibitor/angiotensin II receptor blocker is being taken. She does not see a podiatrist.Eye exam is current.   Leg Pain   The incident occurred more than 1 week ago. There was no injury mechanism. The pain is present in the right leg and left leg. The pain is at a severity of 7/10. The pain is moderate. The pain has been Worsening since onset. Associated symptoms include numbness and tingling. Pertinent negatives include no inability to bear weight, loss of motion or loss of sensation. The symptoms are aggravated by movement. She has tried NSAIDs for the symptoms. The treatment provided no relief.   Hypothyroidism  This is a chronic problem. The current episode started more than 1 year ago. The problem occurs rarely. The problem has been gradually improving. Associated symptoms include numbness. Pertinent negatives include no chest pain or headaches. The treatment provided moderate relief.       Objective   Vital Signs:  /74   Pulse 75   Temp 96.9 °F (36.1 °C) (Temporal)   Resp 18   Ht 160 cm (63\")   Wt 73 kg (161 lb)   SpO2 98%   BMI 28.52 kg/m²   Estimated body mass index is 28.52 kg/m² as calculated from the following:    Height as of this encounter: 160 cm (63\").    Weight as of this encounter: 73 kg (161 lb).            Physical Exam  Vitals and nursing note reviewed.   Constitutional:       Appearance: Normal appearance. She is obese.   HENT:      Head: Normocephalic.      Right Ear: Tympanic membrane normal.      Left Ear: Tympanic membrane normal.      Nose: " Nose normal.      Mouth/Throat:      Mouth: Mucous membranes are moist.   Eyes:      Extraocular Movements: Extraocular movements intact.      Pupils: Pupils are equal, round, and reactive to light.   Cardiovascular:      Rate and Rhythm: Normal rate and regular rhythm.      Pulses: Normal pulses.      Heart sounds: Normal heart sounds.   Pulmonary:      Effort: Pulmonary effort is normal.      Breath sounds: Normal breath sounds.   Abdominal:      General: Abdomen is protuberant. Bowel sounds are normal.      Palpations: Abdomen is soft.   Musculoskeletal:         General: Normal range of motion.      Cervical back: Normal range of motion and neck supple.   Skin:     General: Skin is warm.      Capillary Refill: Capillary refill takes less than 2 seconds.   Neurological:      General: No focal deficit present.      Mental Status: She is alert and oriented to person, place, and time.   Psychiatric:         Mood and Affect: Mood normal.        Result Review :  The following data was reviewed by: Edgar Lemus MD on 01/11/2024:  Common labs          7/11/2023    10:33 7/11/2023    10:52 10/11/2023    16:01   Common Labs   Glucose 193      BUN 16      Creatinine 0.94      Sodium 140      Potassium 5.0      Chloride 103      Calcium 9.9      Albumin 4.3      Total Bilirubin 0.5      Alkaline Phosphatase 77      AST (SGOT) 17      ALT (SGPT) 10      WBC 6.86      Hemoglobin 13.2      Hematocrit 40.8      Platelets 230      Total Cholesterol 137      Triglycerides 143      HDL Cholesterol 55      LDL Cholesterol  58      Hemoglobin A1C  9.1  10.3    Microalbumin, Urine <1.2        Data reviewed : Radiologic studies 10/16/23 Stress           Assessment and Plan   Diagnoses and all orders for this visit:    1. Dysuria (Primary)  -     POCT urinalysis dipstick, automated    2. Mixed hyperlipidemia  Assessment & Plan:  Lipid abnormalities are improving with treatment.  Nutritional counseling was provided. and  Pharmacotherapy as ordered.  Lipids will be reassessed in 3 months.      3. Diabetes mellitus without complication  Assessment & Plan:  Diabetes is worsening.   Reminded to bring in blood sugar diary at next visit.  Dietary recommendations for ADA diet.  Regular aerobic exercise.  Discussed ways to avoid symptomatic hypoglycemia.  Discussed sick day management.  Discussed foot care.  Reminded to get yearly retinal exam.  Medication changes per orders.  Diabetes will be reassessed in 3 months.    Orders:  -     POC Glycosylated Hemoglobin (Hb A1C)    4. Gastroesophageal reflux disease without esophagitis    5. Benign hypertension  Assessment & Plan:  Hypertension is improving with treatment.  Continue current treatment regimen.  Dietary sodium restriction.  Weight loss.  Regular aerobic exercise.  Blood pressure will be reassessed at the next regular appointment.      6. Palpitations  -     metoprolol succinate XL (TOPROL-XL) 25 MG 24 hr tablet; Take 1 tablet by mouth Daily With Dinner for 180 days.  Dispense: 90 tablet; Refill: 1    7. Leg cramping  Assessment & Plan:  MORE SECONDARY TO RLS -- WILL START TRIASL MED X 2 WEEKS      8. Need for pneumococcal vaccine    9. Hypothyroidism, unspecified type  -     TSH    Other orders  -     Pneumococcal Conjugate Vaccine 20-Valent (PCV20)  -     nitrofurantoin, macrocrystal-monohydrate, (Macrobid) 100 MG capsule; Take 1 capsule by mouth 2 (Two) Times a Day for 7 days.  Dispense: 14 capsule; Refill: 0  -     phenazopyridine (Pyridium) 200 MG tablet; Take 1 tablet by mouth 3 (Three) Times a Day As Needed for Bladder Spasms for up to 2 days.  Dispense: 6 tablet; Refill: 0  -     omeprazole (priLOSEC) 20 MG capsule; Take 1 capsule by mouth Daily for 180 days.  Dispense: 90 capsule; Refill: 1  -     rOPINIRole (REQUIP) 0.5 MG tablet; Take 1 tablet by mouth Every Night for 14 days. Take 1 hour before bedtime.  Dispense: 14 tablet; Refill: 0           I spent 20 minutes caring  cesar Do on this date of service. This time includes time spent by me in the following activities:reviewing tests, obtaining and/or reviewing a separately obtained history, performing a medically appropriate examination and/or evaluation , counseling and educating the patient/family/caregiver, ordering medications, tests, or procedures, and documenting information in the medical record       Follow Up   Return in about 11 months (around 11/29/2024) for Medicare Wellness, [facility] FASTING LABS & 1 MONTH  FF/UP  2/15/24 .  Patient was given instructions and counseling regarding her condition or for health maintenance advice. Please see specific information pulled into the AVS if appropriate.           This document has been electronically signed by Edgar Lemus MD  January 11, 2024 15:03 EST

## 2024-01-22 ENCOUNTER — OFFICE VISIT (OUTPATIENT)
Dept: FAMILY MEDICINE CLINIC | Facility: CLINIC | Age: 64
End: 2024-01-22
Payer: MEDICARE

## 2024-01-22 VITALS
TEMPERATURE: 96.8 F | HEART RATE: 85 BPM | HEIGHT: 63 IN | WEIGHT: 161 LBS | BODY MASS INDEX: 28.53 KG/M2 | SYSTOLIC BLOOD PRESSURE: 138 MMHG | RESPIRATION RATE: 20 BRPM | DIASTOLIC BLOOD PRESSURE: 68 MMHG | OXYGEN SATURATION: 99 %

## 2024-01-22 DIAGNOSIS — M79.671 RIGHT FOOT PAIN: ICD-10-CM

## 2024-01-22 DIAGNOSIS — M79.671 RIGHT FOOT PAIN: Primary | ICD-10-CM

## 2024-01-22 DIAGNOSIS — M10.9 ACUTE GOUT, UNSPECIFIED CAUSE, UNSPECIFIED SITE: ICD-10-CM

## 2024-01-22 LAB
BASOPHILS # BLD AUTO: 0.05 10*3/MM3 (ref 0–0.2)
BASOPHILS NFR BLD AUTO: 0.4 % (ref 0–1.5)
DEPRECATED RDW RBC AUTO: 40.5 FL (ref 37–54)
EOSINOPHIL # BLD AUTO: 0.02 10*3/MM3 (ref 0–0.4)
EOSINOPHIL NFR BLD AUTO: 0.2 % (ref 0.3–6.2)
ERYTHROCYTE [DISTWIDTH] IN BLOOD BY AUTOMATED COUNT: 12.7 % (ref 12.3–15.4)
HCT VFR BLD AUTO: 41.6 % (ref 34–46.6)
HGB BLD-MCNC: 13.8 G/DL (ref 12–15.9)
IMM GRANULOCYTES # BLD AUTO: 0.07 10*3/MM3 (ref 0–0.05)
IMM GRANULOCYTES NFR BLD AUTO: 0.6 % (ref 0–0.5)
LYMPHOCYTES # BLD AUTO: 2.31 10*3/MM3 (ref 0.7–3.1)
LYMPHOCYTES NFR BLD AUTO: 19.2 % (ref 19.6–45.3)
MCH RBC QN AUTO: 29.1 PG (ref 26.6–33)
MCHC RBC AUTO-ENTMCNC: 33.2 G/DL (ref 31.5–35.7)
MCV RBC AUTO: 87.6 FL (ref 79–97)
MONOCYTES # BLD AUTO: 0.63 10*3/MM3 (ref 0.1–0.9)
MONOCYTES NFR BLD AUTO: 5.2 % (ref 5–12)
NEUTROPHILS NFR BLD AUTO: 74.4 % (ref 42.7–76)
NEUTROPHILS NFR BLD AUTO: 8.94 10*3/MM3 (ref 1.7–7)
NRBC BLD AUTO-RTO: 0 /100 WBC (ref 0–0.2)
PLATELET # BLD AUTO: 238 10*3/MM3 (ref 140–450)
PMV BLD AUTO: 10.5 FL (ref 6–12)
RBC # BLD AUTO: 4.75 10*6/MM3 (ref 3.77–5.28)
URATE SERPL-MCNC: 4.2 MG/DL (ref 2.4–5.7)
WBC NRBC COR # BLD AUTO: 12.02 10*3/MM3 (ref 3.4–10.8)

## 2024-01-22 PROCEDURE — 3078F DIAST BP <80 MM HG: CPT | Performed by: PSYCHOLOGIST

## 2024-01-22 PROCEDURE — 1159F MED LIST DOCD IN RCRD: CPT | Performed by: PSYCHOLOGIST

## 2024-01-22 PROCEDURE — 3075F SYST BP GE 130 - 139MM HG: CPT | Performed by: PSYCHOLOGIST

## 2024-01-22 PROCEDURE — 1160F RVW MEDS BY RX/DR IN RCRD: CPT | Performed by: PSYCHOLOGIST

## 2024-01-22 PROCEDURE — 3046F HEMOGLOBIN A1C LEVEL >9.0%: CPT | Performed by: PSYCHOLOGIST

## 2024-01-22 PROCEDURE — 99213 OFFICE O/P EST LOW 20 MIN: CPT | Performed by: PSYCHOLOGIST

## 2024-01-22 PROCEDURE — 85025 COMPLETE CBC W/AUTO DIFF WBC: CPT | Performed by: PSYCHOLOGIST

## 2024-01-22 PROCEDURE — 96372 THER/PROPH/DIAG INJ SC/IM: CPT | Performed by: PSYCHOLOGIST

## 2024-01-22 PROCEDURE — 84550 ASSAY OF BLOOD/URIC ACID: CPT | Performed by: PSYCHOLOGIST

## 2024-01-22 RX ORDER — INDOMETHACIN 50 MG/1
CAPSULE ORAL
Qty: 30 CAPSULE | Refills: 0 | Status: SHIPPED | OUTPATIENT
Start: 2024-01-22

## 2024-01-22 RX ORDER — KETOROLAC TROMETHAMINE 30 MG/ML
30 INJECTION, SOLUTION INTRAMUSCULAR; INTRAVENOUS ONCE
Status: DISCONTINUED | OUTPATIENT
Start: 2024-01-22 | End: 2024-01-22

## 2024-01-22 RX ORDER — KETOROLAC TROMETHAMINE 30 MG/ML
30 INJECTION, SOLUTION INTRAMUSCULAR; INTRAVENOUS ONCE
Status: COMPLETED | OUTPATIENT
Start: 2024-01-22 | End: 2024-01-22

## 2024-01-22 RX ADMIN — KETOROLAC TROMETHAMINE 30 MG: 30 INJECTION, SOLUTION INTRAMUSCULAR; INTRAVENOUS at 14:44

## 2024-01-22 NOTE — PROGRESS NOTES
"Chief Complaint  Foot Swelling (Right foot, painful, on-going but worsened last night; states barely able to walk)    Subjective        Ernestina Wisdom presents to [facility]  C./o an acute medical illness as her PCP:   Pain  This is a new problem. The current episode started in the past 7 days. The problem occurs constantly. The problem has been gradually improving. Pertinent negatives include no chills, coughing, fatigue, fever or swollen glands. She has tried nothing for the symptoms.       Objective   Vital Signs:  /68 (BP Location: Left arm, Patient Position: Sitting, Cuff Size: Adult)   Pulse 85   Temp 96.8 °F (36 °C) (Temporal)   Resp 20   Ht 160 cm (63\")   Wt 73 kg (161 lb)   SpO2 99%   BMI 28.52 kg/m²   Estimated body mass index is 28.52 kg/m² as calculated from the following:    Height as of this encounter: 160 cm (63\").    Weight as of this encounter: 73 kg (161 lb).            Physical Exam  Vitals and nursing note reviewed.   Constitutional:       Appearance: Normal appearance. She is obese.   HENT:      Head: Normocephalic.      Right Ear: Tympanic membrane normal.      Left Ear: Tympanic membrane normal.      Nose: Nose normal.      Mouth/Throat:      Mouth: Mucous membranes are moist.   Eyes:      Extraocular Movements: Extraocular movements intact.      Pupils: Pupils are equal, round, and reactive to light.   Cardiovascular:      Rate and Rhythm: Normal rate and regular rhythm.      Pulses: Normal pulses.      Heart sounds: Normal heart sounds.   Pulmonary:      Effort: Pulmonary effort is normal.      Breath sounds: Normal breath sounds.   Abdominal:      General: Abdomen is protuberant. Bowel sounds are normal.      Palpations: Abdomen is soft.   Musculoskeletal:         General: Normal range of motion.      Cervical back: Normal range of motion and neck supple.   Feet:      Comments: Right foot -- first and second digit are swollen and pain to touch / " erythema noted  Skin:     General: Skin is warm.      Capillary Refill: Capillary refill takes less than 2 seconds.   Neurological:      General: No focal deficit present.      Mental Status: She is alert and oriented to person, place, and time.   Psychiatric:         Mood and Affect: Mood normal.        Result Review :  The following data was reviewed by: Edgar Lemus MD on 01/22/2024:  Common labs          7/11/2023    10:33 7/11/2023    10:52 10/11/2023    16:01 1/11/2024    15:27   Common Labs   Glucose 193       BUN 16       Creatinine 0.94       Sodium 140       Potassium 5.0       Chloride 103       Calcium 9.9       Albumin 4.3       Total Bilirubin 0.5       Alkaline Phosphatase 77       AST (SGOT) 17       ALT (SGPT) 10       WBC 6.86       Hemoglobin 13.2       Hematocrit 40.8       Platelets 230       Total Cholesterol 137       Triglycerides 143       HDL Cholesterol 55       LDL Cholesterol  58       Hemoglobin A1C  9.1  10.3  10.0    Microalbumin, Urine <1.2         Data reviewed : Radiologic studies 10/2/23 DEXA            Assessment and Plan   Diagnoses and all orders for this visit:    1. Right foot pain (Primary)    2. Acute gout, unspecified cause, unspecified site           I spent 20 minutes caring for Ernestina on this date of service. This time includes time spent by me in the following activities:reviewing tests, obtaining and/or reviewing a separately obtained history, performing a medically appropriate examination and/or evaluation , counseling and educating the patient/family/caregiver, ordering medications, tests, or procedures, and documenting information in the medical record       Follow Up   No follow-ups on file.  Patient was given instructions and counseling regarding her condition or for health maintenance advice. Please see specific information pulled into the AVS if appropriate.           This document has been electronically signed by Edgar Lemus MD  January 22,  2024 14:34 EST

## 2024-01-23 RX ORDER — CEPHALEXIN 250 MG/1
250 CAPSULE ORAL 2 TIMES DAILY WITH MEALS
Qty: 20 CAPSULE | Refills: 0 | Status: SHIPPED | OUTPATIENT
Start: 2024-01-23 | End: 2024-02-02

## 2024-01-23 NOTE — PROGRESS NOTES
Called patient regarding lab results and reassured.  Verbalizes understanding and remind her of her next appoint with me.

## 2024-02-02 RX ORDER — DULAGLUTIDE 0.75 MG/.5ML
0.75 INJECTION, SOLUTION SUBCUTANEOUS WEEKLY
Qty: 2 ML | Refills: 0 | Status: SHIPPED | OUTPATIENT
Start: 2024-02-02

## 2024-02-02 RX ORDER — LOSARTAN POTASSIUM AND HYDROCHLOROTHIAZIDE 25; 100 MG/1; MG/1
1 TABLET ORAL
Qty: 83 TABLET | Refills: 0 | OUTPATIENT
Start: 2024-02-02

## 2024-02-20 ENCOUNTER — OFFICE VISIT (OUTPATIENT)
Dept: FAMILY MEDICINE CLINIC | Facility: CLINIC | Age: 64
End: 2024-02-20
Payer: MEDICARE

## 2024-02-20 VITALS
HEART RATE: 66 BPM | DIASTOLIC BLOOD PRESSURE: 64 MMHG | WEIGHT: 160 LBS | SYSTOLIC BLOOD PRESSURE: 122 MMHG | OXYGEN SATURATION: 99 % | HEIGHT: 63 IN | RESPIRATION RATE: 18 BRPM | TEMPERATURE: 97.6 F | BODY MASS INDEX: 28.35 KG/M2

## 2024-02-20 DIAGNOSIS — G57.93 NEUROPATHY INVOLVING BOTH LOWER EXTREMITIES: Primary | ICD-10-CM

## 2024-02-20 DIAGNOSIS — E11.9 DIABETES MELLITUS WITHOUT COMPLICATION: ICD-10-CM

## 2024-02-20 DIAGNOSIS — G25.81 RESTLESS LEG SYNDROME: ICD-10-CM

## 2024-02-20 DIAGNOSIS — M19.90 ARTHRITIS: ICD-10-CM

## 2024-02-20 DIAGNOSIS — Z76.0 ENCOUNTER FOR MEDICATION REFILL: ICD-10-CM

## 2024-02-20 DIAGNOSIS — M81.0 AGE-RELATED OSTEOPOROSIS WITHOUT CURRENT PATHOLOGICAL FRACTURE: ICD-10-CM

## 2024-02-20 DIAGNOSIS — I10 BENIGN HYPERTENSION: ICD-10-CM

## 2024-02-20 LAB
BILIRUB BLD-MCNC: NEGATIVE MG/DL
CLARITY, POC: CLEAR
COLOR UR: YELLOW
EXPIRATION DATE: ABNORMAL
GLUCOSE UR STRIP-MCNC: NEGATIVE MG/DL
HBA1C MFR BLD: 9.2 % (ref 4.5–5.7)
KETONES UR QL: ABNORMAL
LEUKOCYTE EST, POC: NEGATIVE
Lab: ABNORMAL
NITRITE UR-MCNC: NEGATIVE MG/ML
PH UR: 6 [PH] (ref 5–8)
PROT UR STRIP-MCNC: NEGATIVE MG/DL
RBC # UR STRIP: NEGATIVE /UL
SP GR UR: 1.02 (ref 1–1.03)
UROBILINOGEN UR QL: NORMAL

## 2024-02-20 PROCEDURE — 84443 ASSAY THYROID STIM HORMONE: CPT | Performed by: PSYCHOLOGIST

## 2024-02-20 PROCEDURE — 82607 VITAMIN B-12: CPT | Performed by: PSYCHOLOGIST

## 2024-02-20 PROCEDURE — 80061 LIPID PANEL: CPT | Performed by: PSYCHOLOGIST

## 2024-02-20 PROCEDURE — 80053 COMPREHEN METABOLIC PANEL: CPT | Performed by: PSYCHOLOGIST

## 2024-02-20 PROCEDURE — 85025 COMPLETE CBC W/AUTO DIFF WBC: CPT | Performed by: PSYCHOLOGIST

## 2024-02-20 PROCEDURE — 82043 UR ALBUMIN QUANTITATIVE: CPT | Performed by: PSYCHOLOGIST

## 2024-02-20 PROCEDURE — 82306 VITAMIN D 25 HYDROXY: CPT | Performed by: PSYCHOLOGIST

## 2024-02-20 RX ORDER — ROPINIROLE 1 MG/1
1 TABLET, FILM COATED ORAL NIGHTLY
Qty: 30 TABLET | Refills: 0 | Status: SHIPPED | OUTPATIENT
Start: 2024-02-20

## 2024-02-20 RX ORDER — MELOXICAM 7.5 MG/1
7.5 TABLET ORAL
Qty: 90 TABLET | Refills: 0 | Status: SHIPPED | OUTPATIENT
Start: 2024-02-20 | End: 2024-05-20

## 2024-02-20 RX ORDER — LOSARTAN POTASSIUM AND HYDROCHLOROTHIAZIDE 25; 100 MG/1; MG/1
1 TABLET ORAL
Qty: 90 TABLET | Refills: 1 | Status: SHIPPED | OUTPATIENT
Start: 2024-02-20 | End: 2024-08-18

## 2024-02-20 RX ORDER — DULAGLUTIDE 0.75 MG/.5ML
0.75 INJECTION, SOLUTION SUBCUTANEOUS WEEKLY
Qty: 2 ML | Refills: 2 | Status: SHIPPED | OUTPATIENT
Start: 2024-02-20 | End: 2024-05-08

## 2024-02-20 NOTE — PROGRESS NOTES
"Chief Complaint  Restless Legs Syndrome (1 Month Follow up on RLS medication Requip after 2 week trial & requesting referral for Podiatry./)    Subjective        Ernestina Wisdom presents to [facility]  C/O RLS/ LEGS SWELLING/ 2.  REFERRAL PODIATRTY   Diabetes  She presents for her follow-up diabetic visit. She has type 2 diabetes mellitus. Her disease course has been worsening. Pertinent negatives for hypoglycemia include no headaches. Pertinent negatives for diabetes include no chest pain. Symptoms are worsening. Her weight is stable. She is following a generally healthy diet. She has not had a previous visit with a dietitian. She participates in exercise daily. An ACE inhibitor/angiotensin II receptor blocker is being taken. She does not see a podiatrist. Eye exam is current.   Hypertension  The current episode started more than 1 year ago. The problem has been stable since onset. The problem is controlled. Pertinent negatives include no chest pain, headaches, palpitations or shortness of breath. Agents associated with hypertension include thyroid hormones. Past treatments include lifestyle changes, calcium channel blockers, angiotensin blockers, diuretics and beta blockers. Current antihypertension treatment includes lifestyle changes, calcium channel blockers, angiotensin blockers, diuretics and beta blockers. There is no history of angina, kidney disease or CAD/MI. Identifiable causes of hypertension include a thyroid problem. There is no history of chronic renal disease.       Objective   Vital Signs:  /64   Pulse 66   Temp 97.6 °F (36.4 °C) (Temporal)   Resp 18   Ht 160 cm (63\")   Wt 72.6 kg (160 lb)   SpO2 99%   BMI 28.34 kg/m²   Estimated body mass index is 28.34 kg/m² as calculated from the following:    Height as of this encounter: 160 cm (63\").    Weight as of this encounter: 72.6 kg (160 lb).        Physical Exam  Vitals and nursing note reviewed. "   Constitutional:       Appearance: Normal appearance. She is obese.   HENT:      Head: Normocephalic.      Right Ear: Tympanic membrane normal.      Left Ear: Tympanic membrane normal.      Nose: Nose normal.      Mouth/Throat:      Mouth: Mucous membranes are moist.   Eyes:      Extraocular Movements: Extraocular movements intact.      Pupils: Pupils are equal, round, and reactive to light.   Cardiovascular:      Rate and Rhythm: Normal rate and regular rhythm.      Pulses: Normal pulses.      Heart sounds: Normal heart sounds.   Pulmonary:      Effort: Pulmonary effort is normal.      Breath sounds: Normal breath sounds.   Abdominal:      General: Abdomen is protuberant. Bowel sounds are normal.      Palpations: Abdomen is soft.   Musculoskeletal:         General: Normal range of motion.      Cervical back: Normal range of motion and neck supple.   Skin:     General: Skin is warm.      Capillary Refill: Capillary refill takes less than 2 seconds.   Neurological:      General: No focal deficit present.      Mental Status: She is alert and oriented to person, place, and time.   Psychiatric:         Mood and Affect: Mood normal.        Result Review :  The following data was reviewed by: Edgar Lemus MD on 02/20/2024:  Common labs          1/11/2024    15:27 1/22/2024    15:01 2/20/2024    11:41   Common Labs   WBC  12.02     Hemoglobin  13.8     Hematocrit  41.6     Platelets  238     Hemoglobin A1C 10.0   9.2    Uric Acid  4.2       Data reviewed : Radiologic studies 10/16/23 STRESS TEST           Assessment and Plan   Diagnoses and all orders for this visit:    1. Neuropathy involving both lower extremities (Primary)  Assessment & Plan:  WORSENING AND WILL INCREASE MEDS    Orders:  -     Ambulatory Referral to Podiatry    2. Restless leg syndrome    3. Diabetes mellitus without complication  Assessment & Plan:  Diabetes is worsening.   Continue current treatment regimen.  Recommended an ADA  diet.  Recommended a Mediterranean style of eating  Regular aerobic exercise.  Discussed foot care.  Reminded to get yearly retinal exam.  Diabetes will be reassessed in 3 months    Orders:  -     POC Glycosylated Hemoglobin (Hb A1C)  -     CBC & Differential  -     Comprehensive Metabolic Panel  -     Lipid Panel  -     MicroAlbumin, Urine, Random - Urine, Clean Catch  -     TSH  -     Vitamin B12  -     Vitamin D,25-Hydroxy  -     POC Urinalysis Dipstick    4. Benign hypertension  Assessment & Plan:  Hypertension is stable and controlled  Continue current treatment regimen.  Dietary sodium restriction.  Weight loss.  Regular aerobic exercise.  Blood pressure will be reassessed in 3 months.    Orders:  -     POC Glycosylated Hemoglobin (Hb A1C)  -     CBC & Differential  -     Comprehensive Metabolic Panel  -     Lipid Panel  -     MicroAlbumin, Urine, Random - Urine, Clean Catch  -     TSH  -     Vitamin B12  -     Vitamin D,25-Hydroxy  -     POC Urinalysis Dipstick    5. Encounter for medication refill    6. Arthritis  Assessment & Plan:  LOWER LEGS ARE WORSENING WILL START MOBIC      7. Age-related osteoporosis without current pathological fracture  -     Vitamin D,25-Hydroxy    Other orders  -     rOPINIRole (REQUIP) 1 MG tablet; Take 1 tablet by mouth Every Night. Take 1 hour before bedtime.  Dispense: 30 tablet; Refill: 0  -     Dulaglutide (Trulicity) 0.75 MG/0.5ML solution pen-injector; Inject 0.75 mg under the skin into the appropriate area as directed 1 (One) Time Per Week for 12 doses.  Dispense: 2 mL; Refill: 2  -     losartan-hydrochlorothiazide (HYZAAR) 100-25 MG per tablet; Take 1 tablet by mouth Daily With Breakfast for 180 days.  Dispense: 90 tablet; Refill: 1  -     meloxicam (Mobic) 7.5 MG tablet; Take 1 tablet by mouth Daily With Lunch for 90 days.  Dispense: 90 tablet; Refill: 0           I spent 30 minutes caring for Ernestina on this date of service. This time includes time spent by me in the  following activities:reviewing tests, obtaining and/or reviewing a separately obtained history, performing a medically appropriate examination and/or evaluation , counseling and educating the patient/family/caregiver, ordering medications, tests, or procedures, and documenting information in the medical record       Follow Up   Return in about 3 months (around 5/20/2024) for Recheck, RTC FASTING LABS.  Patient was given instructions and counseling regarding her condition or for health maintenance advice. Please see specific information pulled into the AVS if appropriate.           This document has been electronically signed by Edgar Lemus MD  February 20, 2024 17:16 EST

## 2024-02-20 NOTE — ASSESSMENT & PLAN NOTE
Diabetes is worsening.   Continue current treatment regimen.  Recommended an ADA diet.  Recommended a Mediterranean style of eating  Regular aerobic exercise.  Discussed foot care.  Reminded to get yearly retinal exam.  Diabetes will be reassessed in 3 months

## 2024-02-20 NOTE — ASSESSMENT & PLAN NOTE
Hypertension is stable and controlled  Continue current treatment regimen.  Dietary sodium restriction.  Weight loss.  Regular aerobic exercise.  Blood pressure will be reassessed in 3 months.

## 2024-02-21 LAB
25(OH)D3 SERPL-MCNC: 93.4 NG/ML (ref 30–100)
ALBUMIN SERPL-MCNC: 4.9 G/DL (ref 3.5–5.2)
ALBUMIN UR-MCNC: <1.2 MG/DL
ALBUMIN/GLOB SERPL: 1.9 G/DL
ALP SERPL-CCNC: 74 U/L (ref 39–117)
ALT SERPL W P-5'-P-CCNC: 15 U/L (ref 1–33)
ANION GAP SERPL CALCULATED.3IONS-SCNC: 15 MMOL/L (ref 5–15)
AST SERPL-CCNC: 20 U/L (ref 1–32)
BASOPHILS # BLD AUTO: 0.05 10*3/MM3 (ref 0–0.2)
BASOPHILS NFR BLD AUTO: 0.8 % (ref 0–1.5)
BILIRUB SERPL-MCNC: 0.5 MG/DL (ref 0–1.2)
BUN SERPL-MCNC: 27 MG/DL (ref 8–23)
BUN/CREAT SERPL: 24.8 (ref 7–25)
CALCIUM SPEC-SCNC: 9.8 MG/DL (ref 8.6–10.5)
CHLORIDE SERPL-SCNC: 100 MMOL/L (ref 98–107)
CHOLEST SERPL-MCNC: 139 MG/DL (ref 0–200)
CO2 SERPL-SCNC: 26 MMOL/L (ref 22–29)
CREAT SERPL-MCNC: 1.09 MG/DL (ref 0.57–1)
DEPRECATED RDW RBC AUTO: 40.3 FL (ref 37–54)
EGFRCR SERPLBLD CKD-EPI 2021: 56.5 ML/MIN/1.73
EOSINOPHIL # BLD AUTO: 0.05 10*3/MM3 (ref 0–0.4)
EOSINOPHIL NFR BLD AUTO: 0.8 % (ref 0.3–6.2)
ERYTHROCYTE [DISTWIDTH] IN BLOOD BY AUTOMATED COUNT: 13 % (ref 12.3–15.4)
GLOBULIN UR ELPH-MCNC: 2.6 GM/DL
GLUCOSE SERPL-MCNC: 132 MG/DL (ref 65–99)
HCT VFR BLD AUTO: 38.4 % (ref 34–46.6)
HDLC SERPL-MCNC: 49 MG/DL (ref 40–60)
HGB BLD-MCNC: 12.9 G/DL (ref 12–15.9)
IMM GRANULOCYTES # BLD AUTO: 0.02 10*3/MM3 (ref 0–0.05)
IMM GRANULOCYTES NFR BLD AUTO: 0.3 % (ref 0–0.5)
LDLC SERPL CALC-MCNC: 71 MG/DL (ref 0–100)
LDLC/HDLC SERPL: 1.43 {RATIO}
LYMPHOCYTES # BLD AUTO: 2.67 10*3/MM3 (ref 0.7–3.1)
LYMPHOCYTES NFR BLD AUTO: 44.5 % (ref 19.6–45.3)
MCH RBC QN AUTO: 29.1 PG (ref 26.6–33)
MCHC RBC AUTO-ENTMCNC: 33.6 G/DL (ref 31.5–35.7)
MCV RBC AUTO: 86.5 FL (ref 79–97)
MONOCYTES # BLD AUTO: 0.38 10*3/MM3 (ref 0.1–0.9)
MONOCYTES NFR BLD AUTO: 6.3 % (ref 5–12)
NEUTROPHILS NFR BLD AUTO: 2.83 10*3/MM3 (ref 1.7–7)
NEUTROPHILS NFR BLD AUTO: 47.3 % (ref 42.7–76)
NRBC BLD AUTO-RTO: 0 /100 WBC (ref 0–0.2)
PLATELET # BLD AUTO: 248 10*3/MM3 (ref 140–450)
PMV BLD AUTO: 10.7 FL (ref 6–12)
POTASSIUM SERPL-SCNC: 4 MMOL/L (ref 3.5–5.2)
PROT SERPL-MCNC: 7.5 G/DL (ref 6–8.5)
RBC # BLD AUTO: 4.44 10*6/MM3 (ref 3.77–5.28)
SODIUM SERPL-SCNC: 141 MMOL/L (ref 136–145)
TRIGL SERPL-MCNC: 100 MG/DL (ref 0–150)
TSH SERPL DL<=0.05 MIU/L-ACNC: 1.84 UIU/ML (ref 0.27–4.2)
VIT B12 BLD-MCNC: 1617 PG/ML (ref 211–946)
VLDLC SERPL-MCNC: 19 MG/DL (ref 5–40)
WBC NRBC COR # BLD AUTO: 6 10*3/MM3 (ref 3.4–10.8)

## 2024-03-01 RX ORDER — DULAGLUTIDE 0.75 MG/.5ML
0.75 INJECTION, SOLUTION SUBCUTANEOUS WEEKLY
Qty: 2 ML | Refills: 2 | OUTPATIENT
Start: 2024-03-01 | End: 2024-05-18

## 2024-03-01 NOTE — TELEPHONE ENCOUNTER
Incoming Refill Request      Medication requested (name and dose):     Dulaglutide (Trulicity) 0.75 MG/0.5ML solution pen-injector 0.75 mg, Subcutaneous, Weekly       Pharmacy where request should be sent: [facility]    Additional details provided by patient:     Best call back number: [phone]    Does the patient have less than a 3 day supply:  [x] Yes  [] No    Jazmine Brewer Rep  03/01/24, 15:19 EST

## 2024-03-18 RX ORDER — ROPINIROLE 1 MG/1
TABLET, FILM COATED ORAL
Qty: 30 TABLET | Refills: 0 | Status: SHIPPED | OUTPATIENT
Start: 2024-03-18

## 2024-04-05 DIAGNOSIS — E78.2 MIXED HYPERLIPIDEMIA: ICD-10-CM

## 2024-04-05 RX ORDER — LEVOTHYROXINE SODIUM 0.05 MG/1
TABLET ORAL
Qty: 90 TABLET | Refills: 0 | Status: SHIPPED | OUTPATIENT
Start: 2024-04-05

## 2024-04-05 RX ORDER — ROSUVASTATIN CALCIUM 5 MG/1
5 TABLET, COATED ORAL
Qty: 90 TABLET | Refills: 0 | Status: SHIPPED | OUTPATIENT
Start: 2024-04-05

## 2024-04-05 NOTE — TELEPHONE ENCOUNTER
Rx Refill Note  Requested Prescriptions     Pending Prescriptions Disp Refills    rosuvastatin (CRESTOR) 5 MG tablet [Pharmacy Med Name: Rosuvastatin Calcium 5 MG Oral Tablet] 90 tablet 0     Sig: TAKE 1 TABLET BY MOUTH ONCE DAILY WITH  DINNER      Last office visit with prescribing clinician: Visit date not found   Last telemedicine visit with prescribing clinician: Visit date not found   Next office visit with prescribing clinician: Visit date not found                         Would you like a call back once the refill request has been completed: [] Yes [] No    If the office needs to give you a call back, can they leave a voicemail: [] Yes [] No    Zo Ortega RN  04/05/24, 08:37 EDT

## 2024-05-01 ENCOUNTER — OFFICE VISIT (OUTPATIENT)
Dept: CARDIOLOGY | Facility: CLINIC | Age: 64
End: 2024-05-01
Payer: MEDICARE

## 2024-05-01 VITALS
DIASTOLIC BLOOD PRESSURE: 79 MMHG | HEART RATE: 63 BPM | HEIGHT: 63 IN | SYSTOLIC BLOOD PRESSURE: 144 MMHG | BODY MASS INDEX: 28.21 KG/M2 | OXYGEN SATURATION: 99 % | WEIGHT: 159.2 LBS

## 2024-05-01 DIAGNOSIS — R06.02 SHORTNESS OF BREATH: ICD-10-CM

## 2024-05-01 DIAGNOSIS — R07.2 PRECORDIAL PAIN: ICD-10-CM

## 2024-05-01 DIAGNOSIS — I10 BENIGN HYPERTENSION: ICD-10-CM

## 2024-05-01 DIAGNOSIS — I10 ESSENTIAL HYPERTENSION: ICD-10-CM

## 2024-05-01 DIAGNOSIS — E78.2 MIXED HYPERLIPIDEMIA: ICD-10-CM

## 2024-05-01 DIAGNOSIS — R00.2 PALPITATIONS: Primary | ICD-10-CM

## 2024-05-01 DIAGNOSIS — I47.10 PAROXYSMAL SVT (SUPRAVENTRICULAR TACHYCARDIA): ICD-10-CM

## 2024-05-01 PROCEDURE — 99214 OFFICE O/P EST MOD 30 MIN: CPT | Performed by: SPECIALIST

## 2024-05-01 PROCEDURE — 3077F SYST BP >= 140 MM HG: CPT | Performed by: SPECIALIST

## 2024-05-01 PROCEDURE — 3078F DIAST BP <80 MM HG: CPT | Performed by: SPECIALIST

## 2024-05-01 RX ORDER — ROSUVASTATIN CALCIUM 5 MG/1
5 TABLET, COATED ORAL
Qty: 90 TABLET | Refills: 0 | Status: SHIPPED | OUTPATIENT
Start: 2024-05-01

## 2024-05-01 RX ORDER — METOPROLOL SUCCINATE 25 MG/1
25 TABLET, EXTENDED RELEASE ORAL
Qty: 90 TABLET | Refills: 1 | Status: SHIPPED | OUTPATIENT
Start: 2024-05-01 | End: 2024-10-28

## 2024-05-01 RX ORDER — NITROGLYCERIN 0.4 MG/1
TABLET SUBLINGUAL
Qty: 30 TABLET | Refills: 5 | Status: SHIPPED | OUTPATIENT
Start: 2024-05-01

## 2024-05-01 RX ORDER — LOSARTAN POTASSIUM AND HYDROCHLOROTHIAZIDE 25; 100 MG/1; MG/1
1 TABLET ORAL
Qty: 90 TABLET | Refills: 1 | Status: SHIPPED | OUTPATIENT
Start: 2024-05-01 | End: 2024-10-28

## 2024-05-01 RX ORDER — AMLODIPINE BESYLATE 5 MG/1
5 TABLET ORAL DAILY
Qty: 90 TABLET | Refills: 1 | Status: SHIPPED | OUTPATIENT
Start: 2024-05-01

## 2024-05-01 NOTE — PROGRESS NOTES
Subjective   Follow up, stress test result  Ernestina Wisdom is a 63 y.o. female who presents to day for Follow-up (6mth testing and monitor ).    CHIEF COMPLIANT  Chief Complaint   Patient presents with    Follow-up     6mth testing and monitor        Active Problems:  Problem List Items Addressed This Visit          Cardiac and Vasculature    Benign hypertension    Relevant Medications    amLODIPine (NORVASC) 5 MG tablet    losartan-hydrochlorothiazide (HYZAAR) 100-25 MG per tablet    metoprolol succinate XL (TOPROL-XL) 25 MG 24 hr tablet    Mixed hyperlipidemia    Relevant Medications    rosuvastatin (CRESTOR) 5 MG tablet    Other Relevant Orders    Lipid Panel    Comprehensive Metabolic Panel    Precordial pain    Relevant Medications    nitroglycerin (NITROSTAT) 0.4 MG SL tablet    Palpitations - Primary    Relevant Medications    metoprolol succinate XL (TOPROL-XL) 25 MG 24 hr tablet    nitroglycerin (NITROSTAT) 0.4 MG SL tablet    Paroxysmal SVT (supraventricular tachycardia)    Relevant Medications    amLODIPine (NORVASC) 5 MG tablet    metoprolol succinate XL (TOPROL-XL) 25 MG 24 hr tablet    nitroglycerin (NITROSTAT) 0.4 MG SL tablet       Pulmonary and Pneumonias    Shortness of breath     Other Visit Diagnoses       Essential hypertension        Relevant Medications    amLODIPine (NORVASC) 5 MG tablet    losartan-hydrochlorothiazide (HYZAAR) 100-25 MG per tablet    metoprolol succinate XL (TOPROL-XL) 25 MG 24 hr tablet    nitroglycerin (NITROSTAT) 0.4 MG SL tablet            HPI  HPI  Patient is doing better but she still getting chest discomfort mostly at rest with the chest which will be tightening for up to 2 minutes at a time she still getting palpitations but is much better than before now this has not happened she still gets short of breath especially when she does things like thinking brain activity no edema  PRIOR MEDS  Current Outpatient Medications on File Prior to Visit   Medication Sig Dispense  Refill    aspirin 81 MG EC tablet Take 1 tablet by mouth Daily.      cyclobenzaprine (FLEXERIL) 10 MG tablet Take 1 tablet by mouth 3 (Three) Times a Day As Needed for Muscle Spasms.      Dulaglutide (Trulicity) 0.75 MG/0.5ML solution pen-injector Inject 0.75 mg under the skin into the appropriate area as directed 1 (One) Time Per Week for 12 doses. 2 mL 2    fluticasone (FLONASE) 50 MCG/ACT nasal spray 2 sprays into the nostril(s) as directed by provider Daily for 7 days. 16 g 10    furosemide (LASIX) 20 MG tablet Take 1 tablet by mouth Daily for 60 days. (Patient taking differently: Take 1 tablet by mouth Daily As Needed. Takes PRN, states she gets myalgias if she takes it daily) 30 tablet 1    levothyroxine (SYNTHROID, LEVOTHROID) 50 MCG tablet TAKE 1 TABLET BY MOUTH ONCE DAILY IN THE MORNING WHEN  YOU  WAKE  UP  ON  AN  EMPTY  STOMACH 90 tablet 0    MELATONIN PO Take 10 mg by mouth At Night As Needed.      meloxicam (Mobic) 7.5 MG tablet Take 1 tablet by mouth Daily With Lunch for 90 days. 90 tablet 0    metFORMIN (GLUCOPHAGE) 500 MG tablet Take 2 tabs at breakfast . Take 1 tab at lunch and 2 tabs at dinner (Patient taking differently: Take 2 tablets by mouth 2 (Two) Times a Day With Meals.  takes 1000mg in am if she eats a large breakfast and 1000pm in the night) 450 tablet 1    Multiple Vitamins-Minerals (Hair Skin Nails) capsule Take 1 capsule by mouth Daily. 5000mgm with Biotin      omeprazole (priLOSEC) 20 MG capsule Take 1 capsule by mouth Daily for 180 days. 90 capsule 1    [DISCONTINUED] amLODIPine (NORVASC) 5 MG tablet Take 1 tablet by mouth once daily 90 tablet 1    [DISCONTINUED] losartan-hydrochlorothiazide (HYZAAR) 100-25 MG per tablet Take 1 tablet by mouth Daily With Breakfast for 180 days. 90 tablet 1    [DISCONTINUED] metoprolol succinate XL (TOPROL-XL) 25 MG 24 hr tablet Take 1 tablet by mouth Daily With Dinner for 180 days. 90 tablet 1    [DISCONTINUED] nitroglycerin (NITROSTAT) 0.4 MG SL  "tablet 1 under the tongue as needed for angina, may repeat q5mins for up three doses 30 tablet 5    [DISCONTINUED] rosuvastatin (CRESTOR) 5 MG tablet TAKE 1 TABLET BY MOUTH ONCE DAILY WITH  DINNER 90 tablet 0    [DISCONTINUED] vitamin B-12 (CYANOCOBALAMIN) 500 MCG tablet Take 1 tablet by mouth Daily.      [DISCONTINUED] VITAMIN E 400 UNIT capsule Take 1 capsule by mouth Daily.      rOPINIRole (REQUIP) 1 MG tablet TAKE 1 TABLET BY MOUTH ONE HOUR BEFORE BEDTIME NIGHTLY 30 tablet 0     No current facility-administered medications on file prior to visit.       ALLERGIES  Empagliflozin, Sulfa antibiotics, Canagliflozin, Isosorbide, Lisinopril, Atorvastatin, and Atorvastatin calcium    HISTORY  Past Medical History:   Diagnosis Date    Anxiety     Diabetes mellitus     GERD (gastroesophageal reflux disease)     Hyperlipidemia     Hypertension     Hypothyroidism     Leaky heart valve        Social History     Socioeconomic History    Marital status:    Tobacco Use    Smoking status: Never     Passive exposure: Past    Smokeless tobacco: Never   Vaping Use    Vaping status: Never Used   Substance and Sexual Activity    Alcohol use: Never    Drug use: Never    Sexual activity: Defer       Family History   Problem Relation Age of Onset    Hypertension Mother     Heart failure Mother     Hypertension Father     Cancer Father     Heart failure Father        Review of Systems   Constitutional:  Positive for fatigue.   HENT:  Positive for rhinorrhea (allergies). Negative for congestion and sore throat.    Eyes:  Positive for visual disturbance (Glasses daily).   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    Cardiovascular:  Positive for chest pain (Off and on \"not much\") and palpitations (Flutters). Negative for leg swelling.   Gastrointestinal:  Positive for constipation (Back and forth) and diarrhea (Back and forth).   Genitourinary:  " "Positive for dysuria (Off and on), frequency and urgency.   Allergic/Immunologic: Positive for environmental allergies.   Neurological:  Positive for dizziness (states she stays dizzy), numbness (Hands) and headaches. Negative for syncope, weakness and light-headedness.   Hematological:  Bruises/bleeds easily (Bruises).   Psychiatric/Behavioral:  Positive for sleep disturbance.        Objective     VITALS: /79   Pulse 63   Ht 160 cm (63\")   Wt 72.2 kg (159 lb 3.2 oz)   SpO2 99%   BMI 28.20 kg/m²     LABS:   Lab Results (most recent)       None            IMAGING:   No Images in the past 120 days found..    EXAM:  Physical Exam  Vitals reviewed.   Constitutional:       Appearance: She is well-developed.   HENT:      Head: Normocephalic and atraumatic.   Eyes:      Pupils: Pupils are equal, round, and reactive to light.   Neck:      Thyroid: No thyromegaly.      Vascular: No JVD.   Cardiovascular:      Rate and Rhythm: Normal rate and regular rhythm.      Heart sounds: Normal heart sounds. No murmur heard.     No friction rub. No gallop.   Pulmonary:      Effort: Pulmonary effort is normal. No respiratory distress.      Breath sounds: Normal breath sounds. No stridor. No wheezing or rales.   Chest:      Chest wall: No tenderness.   Musculoskeletal:         General: No tenderness or deformity.      Cervical back: Neck supple.   Skin:     General: Skin is warm and dry.   Neurological:      Mental Status: She is alert and oriented to person, place, and time.      Cranial Nerves: No cranial nerve deficit.      Coordination: Coordination normal.         Procedure   Procedures        Assessment & Plan     Diagnoses and all orders for this visit:    1. Palpitations (Primary)  -     metoprolol succinate XL (TOPROL-XL) 25 MG 24 hr tablet; Take 1 tablet by mouth Daily With Dinner for 180 days.  Dispense: 90 tablet; Refill: 1  -     nitroglycerin (NITROSTAT) 0.4 MG SL tablet; 1 under the tongue as needed for angina, " may repeat q5mins for up three doses  Dispense: 30 tablet; Refill: 5    2. Precordial pain  -     nitroglycerin (NITROSTAT) 0.4 MG SL tablet; 1 under the tongue as needed for angina, may repeat q5mins for up three doses  Dispense: 30 tablet; Refill: 5    3. Mixed hyperlipidemia  -     rosuvastatin (CRESTOR) 5 MG tablet; Take 1 tablet by mouth Daily With Dinner.  Dispense: 90 tablet; Refill: 0  -     Lipid Panel; Future  -     Comprehensive Metabolic Panel; Future    4. Benign hypertension  -     amLODIPine (NORVASC) 5 MG tablet; Take 1 tablet by mouth Daily.  Dispense: 90 tablet; Refill: 1    5. Shortness of breath    6. Paroxysmal SVT (supraventricular tachycardia)    7. Essential hypertension  -     nitroglycerin (NITROSTAT) 0.4 MG SL tablet; 1 under the tongue as needed for angina, may repeat q5mins for up three doses  Dispense: 30 tablet; Refill: 5    Other orders  -     losartan-hydrochlorothiazide (HYZAAR) 100-25 MG per tablet; Take 1 tablet by mouth Daily With Breakfast for 180 days.  Dispense: 90 tablet; Refill: 1    1.  The palpitations are much better now we will continue the current dose of the metoprolol  2.  Her blood pressure slightly elevated today but she insists that her at home her blood pressure now is well-controlled yesterday her systolic pressure was 114 so we will continue monitoring  3.  We discussed the results of the stress test which did not show ischemia she still getting chest pains on and off at rest it could be GI problem  4.  Also discussed results of echocardiogram with normal systolic function but with diastolic dysfunction advised her to eat low-salt diet  5.  We discussed also the results of the monitor and that showed short runs of SVT but now she is doing a lot better with the metoprolol so we will continue the current dose  6.  I reviewed the labs with excellent lipid profile with LDL 71 HDL 49 triglycerides 100    Return in about 6 months (around 11/1/2024).      Advance Care  Planning   ACP discussion was declined by the patient. Patient does not have an advance directive, declines further assistance.             MEDS ORDERED DURING VISIT:  New Medications Ordered This Visit   Medications    amLODIPine (NORVASC) 5 MG tablet     Sig: Take 1 tablet by mouth Daily.     Dispense:  90 tablet     Refill:  1    losartan-hydrochlorothiazide (HYZAAR) 100-25 MG per tablet     Sig: Take 1 tablet by mouth Daily With Breakfast for 180 days.     Dispense:  90 tablet     Refill:  1    metoprolol succinate XL (TOPROL-XL) 25 MG 24 hr tablet     Sig: Take 1 tablet by mouth Daily With Dinner for 180 days.     Dispense:  90 tablet     Refill:  1    nitroglycerin (NITROSTAT) 0.4 MG SL tablet     Si under the tongue as needed for angina, may repeat q5mins for up three doses     Dispense:  30 tablet     Refill:  5    rosuvastatin (CRESTOR) 5 MG tablet     Sig: Take 1 tablet by mouth Daily With Dinner.     Dispense:  90 tablet     Refill:  0       As always, Edgar Lemus MD  I appreciate very much the opportunity to participate in the cardiovascular care of your patients. Please do not hesitate to call me with any questions with regards to Ernestina Wisdom evaluation and management.         This document has been electronically signed by Charito Gillespie MD  May 1, 2024 12:13 EDT    This note is dictated utilizing voice recognition software.

## 2024-05-20 ENCOUNTER — OFFICE VISIT (OUTPATIENT)
Dept: FAMILY MEDICINE CLINIC | Facility: CLINIC | Age: 64
End: 2024-05-20
Payer: MEDICARE

## 2024-05-20 VITALS
SYSTOLIC BLOOD PRESSURE: 125 MMHG | BODY MASS INDEX: 28.24 KG/M2 | DIASTOLIC BLOOD PRESSURE: 75 MMHG | TEMPERATURE: 96.9 F | WEIGHT: 159.4 LBS | HEART RATE: 63 BPM | RESPIRATION RATE: 18 BRPM | HEIGHT: 63 IN | OXYGEN SATURATION: 100 %

## 2024-05-20 DIAGNOSIS — E78.2 MIXED HYPERLIPIDEMIA: Primary | ICD-10-CM

## 2024-05-20 DIAGNOSIS — E03.9 HYPOTHYROIDISM, UNSPECIFIED TYPE: ICD-10-CM

## 2024-05-20 DIAGNOSIS — E11.9 DIABETES MELLITUS WITHOUT COMPLICATION: ICD-10-CM

## 2024-05-20 DIAGNOSIS — M19.90 ARTHRITIS: ICD-10-CM

## 2024-05-20 DIAGNOSIS — I10 BENIGN HYPERTENSION: ICD-10-CM

## 2024-05-20 LAB
ALBUMIN SERPL-MCNC: 4.4 G/DL (ref 3.5–5.2)
ALBUMIN/GLOB SERPL: 1.4 G/DL
ALP SERPL-CCNC: 70 U/L (ref 39–117)
ALT SERPL W P-5'-P-CCNC: 12 U/L (ref 1–33)
ANION GAP SERPL CALCULATED.3IONS-SCNC: 9.8 MMOL/L (ref 5–15)
AST SERPL-CCNC: 17 U/L (ref 1–32)
BILIRUB SERPL-MCNC: 0.5 MG/DL (ref 0–1.2)
BUN SERPL-MCNC: 28 MG/DL (ref 8–23)
BUN/CREAT SERPL: 28.9 (ref 7–25)
CALCIUM SPEC-SCNC: 9.9 MG/DL (ref 8.6–10.5)
CHLORIDE SERPL-SCNC: 104 MMOL/L (ref 98–107)
CHOLEST SERPL-MCNC: 154 MG/DL (ref 0–200)
CO2 SERPL-SCNC: 28.2 MMOL/L (ref 22–29)
CREAT SERPL-MCNC: 0.97 MG/DL (ref 0.57–1)
EGFRCR SERPLBLD CKD-EPI 2021: 65 ML/MIN/1.73
GLOBULIN UR ELPH-MCNC: 3.2 GM/DL
GLUCOSE SERPL-MCNC: 145 MG/DL (ref 65–99)
HBA1C MFR BLD: 7.3 % (ref 4.8–5.6)
HDLC SERPL-MCNC: 56 MG/DL (ref 40–60)
LDLC SERPL CALC-MCNC: 73 MG/DL (ref 0–100)
LDLC/HDLC SERPL: 1.24 {RATIO}
POTASSIUM SERPL-SCNC: 5 MMOL/L (ref 3.5–5.2)
PROT SERPL-MCNC: 7.6 G/DL (ref 6–8.5)
SODIUM SERPL-SCNC: 142 MMOL/L (ref 136–145)
TRIGL SERPL-MCNC: 144 MG/DL (ref 0–150)
TSH SERPL DL<=0.05 MIU/L-ACNC: 1.45 UIU/ML (ref 0.27–4.2)
VLDLC SERPL-MCNC: 25 MG/DL (ref 5–40)

## 2024-05-20 PROCEDURE — 3078F DIAST BP <80 MM HG: CPT | Performed by: PSYCHOLOGIST

## 2024-05-20 PROCEDURE — 83036 HEMOGLOBIN GLYCOSYLATED A1C: CPT | Performed by: PSYCHOLOGIST

## 2024-05-20 PROCEDURE — 1159F MED LIST DOCD IN RCRD: CPT | Performed by: PSYCHOLOGIST

## 2024-05-20 PROCEDURE — 80053 COMPREHEN METABOLIC PANEL: CPT | Performed by: SPECIALIST

## 2024-05-20 PROCEDURE — 1160F RVW MEDS BY RX/DR IN RCRD: CPT | Performed by: PSYCHOLOGIST

## 2024-05-20 PROCEDURE — 3046F HEMOGLOBIN A1C LEVEL >9.0%: CPT | Performed by: PSYCHOLOGIST

## 2024-05-20 PROCEDURE — 3074F SYST BP LT 130 MM HG: CPT | Performed by: PSYCHOLOGIST

## 2024-05-20 PROCEDURE — 99213 OFFICE O/P EST LOW 20 MIN: CPT | Performed by: PSYCHOLOGIST

## 2024-05-20 PROCEDURE — 82607 VITAMIN B-12: CPT | Performed by: PSYCHOLOGIST

## 2024-05-20 PROCEDURE — 84443 ASSAY THYROID STIM HORMONE: CPT | Performed by: PSYCHOLOGIST

## 2024-05-20 PROCEDURE — 1125F AMNT PAIN NOTED PAIN PRSNT: CPT | Performed by: PSYCHOLOGIST

## 2024-05-20 PROCEDURE — 80061 LIPID PANEL: CPT | Performed by: SPECIALIST

## 2024-05-20 RX ORDER — MELOXICAM 7.5 MG/1
7.5 TABLET ORAL
Qty: 90 TABLET | Refills: 1 | Status: SHIPPED | OUTPATIENT
Start: 2024-05-20 | End: 2024-11-16

## 2024-05-20 RX ORDER — GABAPENTIN 300 MG/1
300 CAPSULE ORAL 2 TIMES DAILY
COMMUNITY
Start: 2024-05-07

## 2024-05-20 NOTE — ASSESSMENT & PLAN NOTE
Diabetes is improving with treatment.   Continue current treatment regimen.  Recommended an ADA diet.  Regular aerobic exercise.  Discussed ways to avoid symptomatic hypoglycemia.  Discussed sick day management.  Discussed foot care.  Reminded to get yearly retinal exam.  Diabetes will be reassessed in 6 months

## 2024-05-20 NOTE — PROGRESS NOTES
Chief Complaint  Follow-up (CHECK UP AND LABS)    Subjective        Ernestina Wisdom presents to [facility]  C/o follow up chronic illness / labs:  Arthritis  Presents for follow-up visit. The symptoms have been stable. Associated symptoms include pain at night. Pertinent negatives include no fatigue or fever. Compliance with total regimen is %. Compliance with medications is %.   Hypertension  This is a chronic problem. The current episode started more than 1 year ago. The problem has been stable since onset. The problem is controlled. Pertinent negatives include no anxiety, chest pain, headaches, palpitations, shortness of breath or sweats. Agents associated with hypertension include thyroid hormones. Risk factors for coronary artery disease include obesity, post-menopausal state, dyslipidemia and diabetes mellitus. Past treatments include lifestyle changes, ACE inhibitors, diuretics and calcium channel blockers. Current antihypertension treatment includes lifestyle changes, calcium channel blockers, ACE inhibitors and diuretics. The current treatment provides moderate improvement. There are no compliance problems.  There is no history of angina, kidney disease or CAD/MI. Identifiable causes of hypertension include a thyroid problem. There is no history of chronic renal disease.   Hyperlipidemia  This is a chronic problem. The current episode started more than 1 year ago. The problem is controlled. Recent lipid tests were reviewed and are normal. Exacerbating diseases include hypothyroidism and obesity. She has no history of chronic renal disease. Pertinent negatives include no chest pain, focal sensory loss, leg pain or shortness of breath. Current antihyperlipidemic treatment includes statins. The current treatment provides moderate improvement of lipids. There are no compliance problems.  Risk factors for coronary artery disease include obesity, hypertension,  "post-menopausal and diabetes mellitus.   Hypothyroidism  This is a chronic problem. The current episode started more than 1 year ago. The problem occurs intermittently. The problem has been waxing and waning. Pertinent negatives include no chest pain, fatigue, fever, headaches, nausea or vomiting. The treatment provided moderate relief.       Objective   Vital Signs:  /75 (BP Location: Right arm, Patient Position: Sitting, Cuff Size: Adult)   Pulse 63   Temp 96.9 °F (36.1 °C) (Temporal)   Resp 18   Ht 160 cm (63\")   Wt 72.3 kg (159 lb 6.4 oz)   SpO2 100%   BMI 28.24 kg/m²   Estimated body mass index is 28.24 kg/m² as calculated from the following:    Height as of this encounter: 160 cm (63\").    Weight as of this encounter: 72.3 kg (159 lb 6.4 oz).            Physical Exam  Vitals and nursing note reviewed.   Constitutional:       Appearance: Normal appearance. She is obese.   HENT:      Head: Normocephalic.      Right Ear: Tympanic membrane normal.      Left Ear: Tympanic membrane normal.      Nose: Nose normal.      Mouth/Throat:      Mouth: Mucous membranes are moist.   Eyes:      Extraocular Movements: Extraocular movements intact.      Pupils: Pupils are equal, round, and reactive to light.   Cardiovascular:      Rate and Rhythm: Normal rate and regular rhythm.      Pulses: Normal pulses.      Heart sounds: Normal heart sounds.   Pulmonary:      Effort: Pulmonary effort is normal.      Breath sounds: Normal breath sounds.   Abdominal:      General: Abdomen is protuberant. Bowel sounds are normal.      Palpations: Abdomen is soft.   Musculoskeletal:         General: Normal range of motion.      Cervical back: Normal range of motion and neck supple.   Skin:     General: Skin is warm.      Capillary Refill: Capillary refill takes less than 2 seconds.   Neurological:      General: No focal deficit present.      Mental Status: She is alert and oriented to person, place, and time.   Psychiatric:         " Mood and Affect: Mood normal.        Result Review :  The following data was reviewed by: Edgar Lemus MD on 05/20/2024:  Common labs          1/11/2024    15:27 1/22/2024    15:01 2/20/2024    11:41   Common Labs   Glucose   132    BUN   27    Creatinine   1.09    Sodium   141    Potassium   4.0    Chloride   100    Calcium   9.8    Albumin   4.9    Total Bilirubin   0.5    Alkaline Phosphatase   74    AST (SGOT)   20    ALT (SGPT)   15    WBC  12.02  6.00    Hemoglobin  13.8  12.9    Hematocrit  41.6  38.4    Platelets  238  248    Total Cholesterol   139    Triglycerides   100    HDL Cholesterol   49    LDL Cholesterol    71    Hemoglobin A1C 10.0   9.2    Microalbumin, Urine   <1.2    Uric Acid  4.2       Data reviewed : Radiologic studies 10/26/23 DEXA            Assessment and Plan   Diagnoses and all orders for this visit:    1. Mixed hyperlipidemia (Primary)  Assessment & Plan:   Lipid abnormalities are improving with treatment    Plan:  Continue same medication/s without change.      Discussed medication dosage, use, side effects, and goals of treatment in detail.    Counseled patient on lifestyle modifications to help control hyperlipidemia.     Patient Treatment Goals:   LDL goal is less than 70    Followup at the next regular appointment.    Orders:  -     Hemoglobin A1c  -     Vitamin B12  -     TSH    2. Benign hypertension  Assessment & Plan:  Hypertension is stable and controlled  Continue current treatment regimen.  Dietary sodium restriction.  Weight loss.  Regular aerobic exercise.  Blood pressure will be reassessed in 6 months.    Orders:  -     Hemoglobin A1c  -     Vitamin B12  -     TSH    3. Diabetes mellitus without complication  Assessment & Plan:  Diabetes is improving with treatment.   Continue current treatment regimen.  Recommended an ADA diet.  Regular aerobic exercise.  Discussed ways to avoid symptomatic hypoglycemia.  Discussed sick day management.  Discussed foot  care.  Reminded to get yearly retinal exam.  Diabetes will be reassessed in 6 months    Orders:  -     Hemoglobin A1c  -     Vitamin B12  -     TSH    4. Hypothyroidism, unspecified type  Assessment & Plan:  Will orestes tsh    Orders:  -     Hemoglobin A1c  -     Vitamin B12  -     TSH    5. Arthritis  Assessment & Plan:  Continue current meds / refills today      Other orders  -     meloxicam (Mobic) 7.5 MG tablet; Take 1 tablet by mouth Daily With Lunch for 180 days.  Dispense: 90 tablet; Refill: 1             Follow Up   No follow-ups on file.  Patient was given instructions and counseling regarding her condition or for health maintenance advice. Please see specific information pulled into the AVS if appropriate.           This document has been electronically signed by Edgar Lemus MD  May 20, 2024 10:24 EDT

## 2024-05-21 RX ORDER — MELOXICAM 7.5 MG/1
TABLET ORAL
Qty: 90 TABLET | Refills: 0 | Status: SHIPPED | OUTPATIENT
Start: 2024-05-21

## 2024-05-21 NOTE — TELEPHONE ENCOUNTER
Rx Refill Note  Requested Prescriptions     Pending Prescriptions Disp Refills    meloxicam (MOBIC) 7.5 MG tablet [Pharmacy Med Name: Meloxicam 7.5 MG Oral Tablet] 90 tablet 0     Sig: TAKE 1 TABLET BY MOUTH ONCE DAILY WITH LUNCH FOR 90 DAYS      Last office visit with prescribing clinician: 2/20/2024   Last telemedicine visit with prescribing clinician: Visit date not found   Next office visit with prescribing clinician: 5/20/2024                         Would you like a call back once the refill request has been completed: [] Yes [] No    If the office needs to give you a call back, can they leave a voicemail: [] Yes [] No    Avelina Pete CMA  05/21/24, 16:52 EDTRx Refill Note  Requested Prescriptions     Pending Prescriptions Disp Refills    meloxicam (MOBIC) 7.5 MG tablet [Pharmacy Med Name: Meloxicam 7.5 MG Oral Tablet] 90 tablet 0     Sig: TAKE 1 TABLET BY MOUTH ONCE DAILY WITH LUNCH FOR 90 DAYS      Last office visit with prescribing clinician: 2/20/2024   Last telemedicine visit with prescribing clinician: Visit date not found   Next office visit with prescribing clinician: 5/20/2024                         Would you like a call back once the refill request has been completed: [] Yes [] No    If the office needs to give you a call back, can they leave a voicemail: [] Yes [] No    Avelina Pete CMA  05/21/24, 16:52 EDT

## 2024-05-22 LAB — VIT B12 SERPL-MCNC: 566 PG/ML (ref 232–1245)

## 2024-06-24 RX ORDER — DULAGLUTIDE 0.75 MG/.5ML
0.75 INJECTION, SOLUTION SUBCUTANEOUS WEEKLY
Qty: 2 ML | Refills: 2 | Status: CANCELLED | OUTPATIENT
Start: 2024-06-24 | End: 2024-09-10

## 2024-06-24 NOTE — TELEPHONE ENCOUNTER
PT IS REQUESTING RESULTS FROM LABS HAD IN MAY.  STATED HAD NEVER RECEIVED A CALL TO LET HER KNOW ABOUT THEM.

## 2024-06-24 NOTE — TELEPHONE ENCOUNTER
Rx Refill Note  Requested Prescriptions     Pending Prescriptions Disp Refills    Dulaglutide (Trulicity) 0.75 MG/0.5ML solution pen-injector 2 mL 2     Sig: Inject 0.75 mg under the skin into the appropriate area as directed 1 (One) Time Per Week for 12 doses.      Last office visit with prescribing clinician: 5/20/2024   Last telemedicine visit with prescribing clinician: Visit date not found   Next office visit with prescribing clinician: 12/2/2024                         Would you like a call back once the refill request has been completed: [] Yes [] No    If the office needs to give you a call back, can they leave a voicemail: [] Yes [] No    Avelina Pete CMA  06/24/24, 13:40 EDT

## 2024-06-24 NOTE — TELEPHONE ENCOUNTER
Incoming Refill Request      Medication requested (name and dose):     Dulaglutide (Trulicity) 0.75 MG/0.5ML solution pen-injector () 0.75 mg, Subcutaneous, Weekly       Pharmacy where request should be sent: [facility] [place]    Additional details provided by patient: OUT OF MEDICATION NEEDS ASAP    Best call back number: [phone]    Does the patient have less than a 3 day supply:  [x] Yes  [] No    Jazmine Brewer Rep  24, 12:40 EDT

## 2024-06-26 NOTE — TELEPHONE ENCOUNTER
Rx Refill Note  Requested Prescriptions     Pending Prescriptions Disp Refills    Dulaglutide (Trulicity) 0.75 MG/0.5ML solution pen-injector 2 mL 2     Sig: Inject 0.75 mg under the skin into the appropriate area as directed 1 (One) Time Per Week for 12 doses.      Last office visit with prescribing clinician: Visit date not found   Last telemedicine visit with prescribing clinician: Visit date not found   Next office visit with prescribing clinician: Visit date not found                         Would you like a call back once the refill request has been completed: [] Yes [] No    If the office needs to give you a call back, can they leave a voicemail: [] Yes [] No    Joleen Riggs MA  06/26/24, 09:51 EDT

## 2024-06-27 RX ORDER — DULAGLUTIDE 0.75 MG/.5ML
0.75 INJECTION, SOLUTION SUBCUTANEOUS WEEKLY
Qty: 2 ML | Refills: 2 | Status: SHIPPED | OUTPATIENT
Start: 2024-06-27 | End: 2024-09-13

## 2024-07-01 RX ORDER — LEVOTHYROXINE SODIUM 0.05 MG/1
TABLET ORAL
Qty: 90 TABLET | Refills: 0 | Status: SHIPPED | OUTPATIENT
Start: 2024-07-01

## 2024-07-12 ENCOUNTER — TELEPHONE (OUTPATIENT)
Dept: FAMILY MEDICINE CLINIC | Facility: CLINIC | Age: 64
End: 2024-07-12
Payer: MEDICARE

## 2024-07-12 RX ORDER — DULAGLUTIDE 0.75 MG/.5ML
0.75 INJECTION, SOLUTION SUBCUTANEOUS WEEKLY
Qty: 2 ML | Refills: 2 | Status: SHIPPED | OUTPATIENT
Start: 2024-07-12 | End: 2024-09-28

## 2024-07-12 NOTE — TELEPHONE ENCOUNTER
Patients trulicity isn't covered, patient son is wondering if a pa has been done or can be done to get it covered before she pays out of pocket for it.

## 2024-07-15 NOTE — TELEPHONE ENCOUNTER
Requesting alternative, patient cannot afford to pay for this out of pocket. Patient is allergic to jardiance and invancono (?)

## 2024-07-29 RX ORDER — OMEPRAZOLE 20 MG/1
20 CAPSULE, DELAYED RELEASE ORAL DAILY
Qty: 90 CAPSULE | Refills: 0 | Status: SHIPPED | OUTPATIENT
Start: 2024-07-29

## 2024-07-29 NOTE — TELEPHONE ENCOUNTER
Rx Refill Note  Requested Prescriptions     Pending Prescriptions Disp Refills    omeprazole (priLOSEC) 20 MG capsule [Pharmacy Med Name: Omeprazole 20 MG Oral Capsule Delayed Release] 90 capsule 0     Sig: Take 1 capsule by mouth once daily      Last office visit with prescribing clinician: 5/20/2024   Last telemedicine visit with prescribing clinician: Visit date not found   Next office visit with prescribing clinician: 8/7/2024                         Would you like a call back once the refill request has been completed: [] Yes [] No    If the office needs to give you a call back, can they leave a voicemail: [] Yes [] No    Avelina Pete, SHEREEN  07/29/24, 09:08 EDT

## 2024-08-06 NOTE — TELEPHONE ENCOUNTER
Incoming Refill Request      Medication requested (name and dose):     metFORMIN (GLUCOPHAGE) 500 MG tablet          Patient taking differently: 1,000 mg Oral 2 Times Daily With Meals, takes 1000mg in am if she eats a large breakfast and 1000pm in the night, Reported on 5/1/2024       Pharmacy where request should be sent: [facility] [place]    Additional details provided by patient: OUT OF MEDICATION    Best call back number: [phone]    Does the patient have less than a 3 day supply:  [x] Yes  [] No    Jazmine Brewer Rep  08/06/24, 09:19 EDT

## 2024-09-26 ENCOUNTER — EXTERNAL PBMM DATA (OUTPATIENT)
Dept: PHARMACY | Facility: OTHER | Age: 64
End: 2024-09-26
Payer: MEDICARE

## 2024-09-27 DIAGNOSIS — E78.2 MIXED HYPERLIPIDEMIA: ICD-10-CM

## 2024-09-27 RX ORDER — ROSUVASTATIN CALCIUM 5 MG/1
5 TABLET, COATED ORAL
Qty: 90 TABLET | Refills: 0 | Status: SHIPPED | OUTPATIENT
Start: 2024-09-27

## 2024-11-01 ENCOUNTER — OFFICE VISIT (OUTPATIENT)
Dept: CARDIOLOGY | Facility: CLINIC | Age: 64
End: 2024-11-01
Payer: MEDICARE

## 2024-11-01 VITALS
SYSTOLIC BLOOD PRESSURE: 137 MMHG | HEART RATE: 68 BPM | WEIGHT: 170.2 LBS | HEIGHT: 63 IN | OXYGEN SATURATION: 98 % | DIASTOLIC BLOOD PRESSURE: 74 MMHG | BODY MASS INDEX: 30.16 KG/M2

## 2024-11-01 DIAGNOSIS — I47.10 PAROXYSMAL SVT (SUPRAVENTRICULAR TACHYCARDIA): ICD-10-CM

## 2024-11-01 DIAGNOSIS — R07.2 PRECORDIAL PAIN: ICD-10-CM

## 2024-11-01 DIAGNOSIS — G47.33 OSA (OBSTRUCTIVE SLEEP APNEA): ICD-10-CM

## 2024-11-01 DIAGNOSIS — E78.2 MIXED HYPERLIPIDEMIA: ICD-10-CM

## 2024-11-01 DIAGNOSIS — R06.02 SHORTNESS OF BREATH: ICD-10-CM

## 2024-11-01 DIAGNOSIS — I10 BENIGN HYPERTENSION: Primary | ICD-10-CM

## 2024-11-01 DIAGNOSIS — R00.2 PALPITATIONS: ICD-10-CM

## 2024-11-01 PROCEDURE — 99214 OFFICE O/P EST MOD 30 MIN: CPT | Performed by: SPECIALIST

## 2024-11-01 PROCEDURE — 3075F SYST BP GE 130 - 139MM HG: CPT | Performed by: SPECIALIST

## 2024-11-01 PROCEDURE — 3078F DIAST BP <80 MM HG: CPT | Performed by: SPECIALIST

## 2024-11-01 RX ORDER — GABAPENTIN 600 MG/1
1 TABLET ORAL EVERY 12 HOURS SCHEDULED
COMMUNITY
Start: 2024-10-11

## 2024-11-01 NOTE — PROGRESS NOTES
Subjective   Follow up, HTN  Ernestina Wisdom is a 64 y.o. female who presents to day for Follow-up (Here for 6  mo. F/u), Hyperlipidemia, Hypertension, Palpitations, and Rapid Heart Rate (HX PSVT).    CHIEF COMPLIANT  Chief Complaint   Patient presents with    Follow-up     Here for 6  mo. F/u    Hyperlipidemia    Hypertension    Palpitations    Rapid Heart Rate     HX PSVT     Problem List:     Chest Pain  Shortness of breath  Palpitations  HTN  Hyperlipidemia  DM, type 2  Hypothyroidism  GERD    Problem List Items Addressed This Visit          Cardiac and Vasculature    Benign hypertension - Primary    Mixed hyperlipidemia    Relevant Orders    Lipid Panel    Comprehensive Metabolic Panel    Precordial pain    Relevant Orders    CK    Palpitations    Paroxysmal SVT (supraventricular tachycardia)       Pulmonary and Pneumonias    Shortness of breath       Sleep    ELSA (obstructive sleep apnea)    Relevant Orders    Home Sleep Study       HPI    About 3 weeks ago she has been experiencing constant ache in her right arm then she was asleep and she woke up suddenly with a heart racing and having chest pain with shortness of breath the pain was severe enough that she was looking for the nitroglycerin she could not find it and in about half an hour or so the pain subsided and she was able to sleep again but when she woke up next morning she has noticed that her chest is tender, she continues to have right arm pain constant till today and she also noted she has mild shortness of breath on exertion which has not changed much since she was last seen and no edema she still getting palpitations mostly at night she also notes that she is sleepy she takes naps and she also snores quite a bit at night, her blood pressure was controlled at home                    PRIOR MEDS  Current Outpatient Medications on File Prior to Visit   Medication Sig Dispense Refill    amLODIPine (NORVASC) 5 MG tablet Take 1 tablet by mouth Daily. 90  tablet 1    aspirin 81 MG EC tablet Take 1 tablet by mouth Daily.      cyclobenzaprine (FLEXERIL) 10 MG tablet Take 1 tablet by mouth 3 (Three) Times a Day As Needed for Muscle Spasms.      fluticasone (FLONASE) 50 MCG/ACT nasal spray 2 sprays into the nostril(s) as directed by provider Daily for 7 days. 16 g 10    furosemide (LASIX) 20 MG tablet Take 1 tablet by mouth Daily for 60 days. (Patient taking differently: Take 1 tablet by mouth Daily As Needed. Takes PRN, states she gets myalgias if she takes it daily) 30 tablet 1    gabapentin (NEURONTIN) 600 MG tablet Take 1 tablet by mouth Every 12 (Twelve) Hours.      levothyroxine (SYNTHROID, LEVOTHROID) 50 MCG tablet TAKE 1 TABLET BY MOUTH ONCE DAILY IN THE MORNING WHEN  YOU  WAKE  UP  ON  AN  EMPTY  STOMACH 90 tablet 0    losartan-hydrochlorothiazide (HYZAAR) 100-25 MG per tablet Take 1 tablet by mouth Daily With Breakfast for 180 days. 90 tablet 1    MELATONIN PO Take 10 mg by mouth At Night As Needed.      meloxicam (MOBIC) 7.5 MG tablet TAKE 1 TABLET BY MOUTH ONCE DAILY WITH LUNCH FOR 90 DAYS 90 tablet 0    metFORMIN (GLUCOPHAGE) 500 MG tablet TAKE 2 TABLETS BY MOUTH TWICE DAILY WITH  MEALS 240 tablet 0    metoprolol succinate XL (TOPROL-XL) 25 MG 24 hr tablet Take 1 tablet by mouth Daily With Dinner for 180 days. 90 tablet 1    Multiple Vitamins-Minerals (Hair Skin Nails) capsule Take 1 capsule by mouth Daily. 5000mgm with Biotin      omeprazole (priLOSEC) 20 MG capsule Take 1 capsule by mouth once daily 90 capsule 0    rosuvastatin (CRESTOR) 5 MG tablet TAKE 1 TABLET BY MOUTH ONCE DAILY WITH  DINNER 90 tablet 0    nitroglycerin (NITROSTAT) 0.4 MG SL tablet 1 under the tongue as needed for angina, may repeat q5mins for up three doses (Patient not taking: Reported on 11/1/2024) 30 tablet 5    [DISCONTINUED] gabapentin (NEURONTIN) 300 MG capsule Take 1 capsule by mouth 2 (Two) Times a Day. for neuropathy      [DISCONTINUED] meloxicam (Mobic) 7.5 MG tablet Take 1  "tablet by mouth Daily With Lunch for 180 days. 90 tablet 1    [DISCONTINUED] rOPINIRole (REQUIP) 1 MG tablet TAKE 1 TABLET BY MOUTH ONE HOUR BEFORE BEDTIME NIGHTLY 30 tablet 0     No current facility-administered medications on file prior to visit.       ALLERGIES  Empagliflozin, Sulfa antibiotics, Canagliflozin, Isosorbide, Lisinopril, Atorvastatin, and Atorvastatin calcium    HISTORY  Past Medical History:   Diagnosis Date    Anxiety     Diabetes mellitus     GERD (gastroesophageal reflux disease)     Hyperlipidemia     Hypertension     Hypothyroidism     Leaky heart valve        Social History     Socioeconomic History    Marital status:    Tobacco Use    Smoking status: Never     Passive exposure: Past    Smokeless tobacco: Never   Vaping Use    Vaping status: Never Used   Substance and Sexual Activity    Alcohol use: Never    Drug use: Never    Sexual activity: Defer       Family History   Problem Relation Age of Onset    Hypertension Mother     Heart failure Mother     Hypertension Father     Cancer Father     Heart failure Father        Review of Systems   Constitutional: Negative.    HENT: Negative.     Eyes:  Positive for visual disturbance (glasses).   Respiratory:  Positive for shortness of breath.    Cardiovascular:  Positive for chest pain and palpitations. Negative for leg swelling.   Gastrointestinal: Negative.    Endocrine: Negative.    Genitourinary: Negative.    Musculoskeletal:  Positive for arthralgias and myalgias.   Skin: Negative.    Allergic/Immunologic: Positive for environmental allergies.   Neurological: Negative.    Hematological:  Bruises/bleeds easily.   Psychiatric/Behavioral: Negative.         Objective     VITALS: /74 (BP Location: Left arm, Patient Position: Sitting)   Pulse 68   Ht 160 cm (62.99\")   Wt 77.2 kg (170 lb 3.2 oz)   LMP  (LMP Unknown)   SpO2 98%   BMI 30.16 kg/m²     LABS:   Lab Results (most recent)       None            IMAGING:   No Images in the " past 120 days found..    EXAM:  Physical Exam  Vitals reviewed.   Constitutional:       Appearance: She is well-developed.   HENT:      Head: Normocephalic and atraumatic.   Eyes:      Pupils: Pupils are equal, round, and reactive to light.   Neck:      Thyroid: No thyromegaly.      Vascular: No JVD.   Cardiovascular:      Rate and Rhythm: Normal rate and regular rhythm.      Heart sounds: Normal heart sounds. No murmur heard.     No friction rub. No gallop.      Comments: Tenderness on sternal border, reproducing chest pain  Pulmonary:      Effort: Pulmonary effort is normal. No respiratory distress.      Breath sounds: Normal breath sounds. No stridor. No wheezing or rales.   Chest:      Chest wall: No tenderness.   Musculoskeletal:         General: No tenderness or deformity.      Cervical back: Neck supple.   Skin:     General: Skin is warm and dry.   Neurological:      Mental Status: She is alert and oriented to person, place, and time.      Cranial Nerves: No cranial nerve deficit.      Coordination: Coordination normal.         Procedure   Procedures        Assessment & Plan     Diagnoses and all orders for this visit:    1. Benign hypertension (Primary)    2. Mixed hyperlipidemia  -     Lipid Panel; Future  -     Comprehensive Metabolic Panel; Future    3. Palpitations    4. Paroxysmal SVT (supraventricular tachycardia)    5. Precordial pain  -     CK; Future    6. Shortness of breath    7. ELSA (obstructive sleep apnea)  -     Home Sleep Study; Future    1.  Her blood pressure is well-controlled we will continue the current management  2.  It seemed that she had episodes of SVT with some chest discomfort I am wondering if she is having sleep apnea, this happens at night she also have restless leg syndrome so I am going to refer her for home sleep study  3.  She is quite tender on the chest wall which reproduces her pain as well as she is having constant pain in her right arm I am concerned about if this is  related to the statin therapy some good to check check her CK level and also advised her to hold the rosuvastatin for couple of weeks for assessment if this will make any effect on pain on her especially her right arm  4.  I do not have any recent blood work also I will check her lipid profile    Return in about 4 weeks (around 11/29/2024).      Advance Care Planning   ACP discussion was held with the patient during this visit. Patient does not have an advance directive, declines further assistance.             MEDS ORDERED DURING VISIT:  No orders of the defined types were placed in this encounter.      As always, Provider, No Known  I appreciate very much the opportunity to participate in the cardiovascular care of your patients. Please do not hesitate to call me with any questions with regards to Ernestina Wisdom evaluation and management.         This document has been electronically signed by Charito Gillespie MD  November 1, 2024 12:35 EDT    This note is dictated utilizing voice recognition software.

## 2024-11-21 ENCOUNTER — TELEPHONE (OUTPATIENT)
Dept: CARDIOLOGY | Facility: CLINIC | Age: 64
End: 2024-11-21
Payer: MEDICARE

## 2024-11-21 NOTE — TELEPHONE ENCOUNTER
Working on overdue results, pt is past due for labs as we have not received results or it has not been performed.     LVM for pt.       RELAY    One of our providers has lab orders in for pt, please ask pt to get labs at earliest convenience. If a lipid is ordered, pt must be fasting.     Our lab is in the basement of the office and is available for walk-ins Mon-Thurs 7:30-5. Or patient can let us know where they would like lab orders faxed.

## 2024-12-03 ENCOUNTER — TELEPHONE (OUTPATIENT)
Dept: CARDIOLOGY | Facility: CLINIC | Age: 64
End: 2024-12-03
Payer: MEDICARE

## 2025-05-28 ENCOUNTER — TELEPHONE (OUTPATIENT)
Dept: CARDIOLOGY | Facility: CLINIC | Age: 65
End: 2025-05-28

## 2025-05-28 NOTE — TELEPHONE ENCOUNTER
Caller: [facility]    Relationship to patient: Other    Best call back number: [phone]    Chief complaint: PATIENT NEEDS HOSPITAL FOLLOW-UP WITHIN 2-4 WEEKS    Type of visit: HOSPITAL FOLLOW-UP    Requested date: 2-4 WEEKS

## 2025-06-06 NOTE — TELEPHONE ENCOUNTER
PATIENT IS CALLING TO REQUEST SOONER APPOINTMENT. PLEASE CALL PATIENT [phone] IF AN EARLIER APPOINTMENT BECOMES AVAILABLE.

## 2025-06-18 ENCOUNTER — PRIOR AUTHORIZATION (OUTPATIENT)
Dept: CARDIOLOGY | Facility: CLINIC | Age: 65
End: 2025-06-18
Payer: MEDICARE

## 2025-06-18 ENCOUNTER — OFFICE VISIT (OUTPATIENT)
Dept: CARDIOLOGY | Facility: CLINIC | Age: 65
End: 2025-06-18
Payer: MEDICARE

## 2025-06-18 VITALS
OXYGEN SATURATION: 99 % | DIASTOLIC BLOOD PRESSURE: 82 MMHG | BODY MASS INDEX: 29.09 KG/M2 | HEIGHT: 63 IN | WEIGHT: 164.2 LBS | SYSTOLIC BLOOD PRESSURE: 141 MMHG | HEART RATE: 69 BPM

## 2025-06-18 DIAGNOSIS — I25.10 CORONARY ARTERY DISEASE INVOLVING NATIVE CORONARY ARTERY OF NATIVE HEART, UNSPECIFIED WHETHER ANGINA PRESENT: Primary | ICD-10-CM

## 2025-06-18 DIAGNOSIS — I10 BENIGN HYPERTENSION: ICD-10-CM

## 2025-06-18 DIAGNOSIS — E78.2 MIXED HYPERLIPIDEMIA: ICD-10-CM

## 2025-06-18 DIAGNOSIS — R00.2 PALPITATIONS: ICD-10-CM

## 2025-06-18 DIAGNOSIS — R07.2 PRECORDIAL PAIN: ICD-10-CM

## 2025-06-18 DIAGNOSIS — I47.10 PAROXYSMAL SVT (SUPRAVENTRICULAR TACHYCARDIA): ICD-10-CM

## 2025-06-18 RX ORDER — BUSPIRONE HYDROCHLORIDE 5 MG/1
5 TABLET ORAL 2 TIMES DAILY
COMMUNITY

## 2025-06-18 RX ORDER — METOPROLOL SUCCINATE 25 MG/1
25 TABLET, EXTENDED RELEASE ORAL DAILY
COMMUNITY

## 2025-06-18 RX ORDER — LOSARTAN POTASSIUM AND HYDROCHLOROTHIAZIDE 25; 100 MG/1; MG/1
1 TABLET ORAL DAILY
COMMUNITY

## 2025-06-18 RX ORDER — PROCHLORPERAZINE 25 MG/1
SUPPOSITORY RECTAL
COMMUNITY

## 2025-06-18 RX ORDER — LEVOCETIRIZINE DIHYDROCHLORIDE 5 MG/1
5 TABLET, FILM COATED ORAL EVERY EVENING
COMMUNITY

## 2025-06-18 RX ORDER — RANOLAZINE 500 MG/1
500 TABLET, EXTENDED RELEASE ORAL 2 TIMES DAILY
Qty: 60 TABLET | Refills: 11 | Status: SHIPPED | OUTPATIENT
Start: 2025-06-18

## 2025-06-18 NOTE — PROGRESS NOTES
Subjective     Ernestina Wisdom is a 64 y.o. female who presents today for Follow-up ([facility]).    CHIEF COMPLIANT  Chief Complaint   Patient presents with    Follow-up     [facility]       Active Problems:  Chest Pain  5/27/25: LHC 60% mid RCA stenosis, LAD 30-40% mid, LCX normal.    Shortness of breath  Palpitations  HTN  Hyperlipidemia  DM, type 2  Hypothyroidism  GERD    HPI  The patient is a 64-year-old female that returns for follow-up after recent hospitalization.  The patient was hospitalized due to chest pain.  She ended up undergoing a left cardiac catheterization which showed moderate CAD with a 60% mid RCA stenosis.  Ongoing medical management was recommended.  The patient underwent an echocardiogram during hospitalization that showed preserved LV function.  Since charge she has had a couple additional episodes of chest discomfort.  She is also noted palpitations where she feels like her heart is skipping and racing.  She states she has had palpitations off and on for few years but does feel a lot it is gotten worse over the past couple months.  She denies syncope or near syncope.    PRIOR MEDS  Current Outpatient Medications on File Prior to Visit   Medication Sig Dispense Refill    amLODIPine (NORVASC) 5 MG tablet Take 1 tablet by mouth Daily. 90 tablet 1    aspirin 81 MG EC tablet Take 1 tablet by mouth Daily.      busPIRone (BUSPAR) 5 MG tablet Take 1 tablet by mouth 2 (Two) Times a Day.      Continuous Glucose Sensor (Dexcom G6 Sensor) Use Every 10 (Ten) Days.      cyclobenzaprine (FLEXERIL) 10 MG tablet Take 1 tablet by mouth 3 (Three) Times a Day As Needed for Muscle Spasms.      fluticasone (FLONASE) 50 MCG/ACT nasal spray 2 sprays into the nostril(s) as directed by provider Daily for 7 days. 16 g 10    furosemide (LASIX) 20 MG tablet Take 1 tablet by mouth Daily for 60 days. (Patient taking differently: Take 1 tablet by mouth Daily As Needed. Takes PRN, states she gets myalgias if she takes it  daily) 30 tablet 1    gabapentin (NEURONTIN) 600 MG tablet Take 1 tablet by mouth Every 12 (Twelve) Hours.      levocetirizine (XYZAL) 5 MG tablet Take 1 tablet by mouth Every Evening.      levothyroxine (SYNTHROID, LEVOTHROID) 50 MCG tablet TAKE 1 TABLET BY MOUTH ONCE DAILY IN THE MORNING WHEN  YOU  WAKE  UP  ON  AN  EMPTY  STOMACH 90 tablet 0    losartan-hydrochlorothiazide (HYZAAR) 100-25 MG per tablet Take 1 tablet by mouth Daily.      MELATONIN PO Take 10 mg by mouth At Night As Needed.      meloxicam (MOBIC) 7.5 MG tablet TAKE 1 TABLET BY MOUTH ONCE DAILY WITH LUNCH FOR 90 DAYS 90 tablet 0    metFORMIN (GLUCOPHAGE) 500 MG tablet TAKE 2 TABLETS BY MOUTH TWICE DAILY WITH  MEALS 240 tablet 0    metoprolol succinate XL (TOPROL-XL) 25 MG 24 hr tablet Take 1 tablet by mouth Daily.      Multiple Vitamins-Minerals (Hair Skin Nails) capsule Take 1 capsule by mouth Daily. 5000mgm with Biotin      nitroglycerin (NITROSTAT) 0.4 MG SL tablet 1 under the tongue as needed for angina, may repeat q5mins for up three doses 30 tablet 5    omeprazole (priLOSEC) 20 MG capsule Take 1 capsule by mouth once daily 90 capsule 0    rosuvastatin (CRESTOR) 5 MG tablet TAKE 1 TABLET BY MOUTH ONCE DAILY WITH  DINNER (Patient taking differently: Take 4 tablets by mouth 2 (Two) Times a Day.) 90 tablet 0    VITAMIN D PO Take  by mouth.      losartan-hydrochlorothiazide (HYZAAR) 100-25 MG per tablet Take 1 tablet by mouth Daily With Breakfast for 180 days. 90 tablet 1    metoprolol succinate XL (TOPROL-XL) 25 MG 24 hr tablet Take 1 tablet by mouth Daily With Dinner for 180 days. 90 tablet 1     No current facility-administered medications on file prior to visit.       ALLERGIES  Empagliflozin, Sulfa antibiotics, Canagliflozin, Isosorbide, Lisinopril, Atorvastatin, and Atorvastatin calcium    HISTORY  Past Medical History:   Diagnosis Date    Anxiety     Diabetes mellitus     GERD (gastroesophageal reflux disease)     Hyperlipidemia      "Hypertension     Hypothyroidism     Leaky heart valve        Social History     Socioeconomic History    Marital status:    Tobacco Use    Smoking status: Never     Passive exposure: Past    Smokeless tobacco: Never   Vaping Use    Vaping status: Never Used   Substance and Sexual Activity    Alcohol use: Never    Drug use: Never    Sexual activity: Defer       Family History   Problem Relation Age of Onset    Hypertension Mother     Heart failure Mother     Hypertension Father     Cancer Father     Heart failure Father        Review of Systems   Constitutional:  Positive for fatigue (States she stays tired and has to have a nap).   Eyes:  Positive for visual disturbance (Glasses daily).   Respiratory:  Positive for chest tightness (w/ episodes) and shortness of breath (When she has flutters).         Pt states she \"about stops breathing because it hurts so bad\" during episodes     Cardiovascular:  Positive for chest pain (Centered pain, unable to describe if sharp or dull.), palpitations (Episodes start w/ flutters, then CP, then tightness.One night recently, heart raced so bad she almost called 911.) and leg swelling (\"not much\").        Pt states she went to [facility] after taking 3 nitros and a nurse asked her if she has AFiB, stated she was told she needs checked for it.     Pt states she's had three more episodes since leaving [facility]     States her episodes can occur w/ no activity       Wore monitor in 2021 and 2023. States she kept forgetting to hit the button to document symptoms. I explained that anything critical or serious would have shown up regardless.    Musculoskeletal:         Pt states she stays sore on her left side      Neurological:  Positive for dizziness (w/ episodes), weakness (w/ episodes) and numbness (w/ episodes). Negative for syncope and headaches.   Hematological:  Bruises/bleeds easily (Bruises).   Psychiatric/Behavioral:  Positive for sleep disturbance (Wakes up 3-4x/night.).  " "      Objective     VITALS: /82   Pulse 69   Ht 160 cm (63\")   Wt 74.5 kg (164 lb 3.2 oz)   LMP  (LMP Unknown)   SpO2 99%   BMI 29.09 kg/m²     LABS:   Lab Results (most recent)       None            IMAGING:   No Images in the past 120 days found..    EXAM:  Physical Exam  Constitutional:       Appearance: Normal appearance.   Eyes:      Pupils: Pupils are equal, round, and reactive to light.   Cardiovascular:      Rate and Rhythm: Normal rate and regular rhythm.      Pulses:           Carotid pulses are 2+ on the right side and 2+ on the left side.       Radial pulses are 2+ on the right side and 2+ on the left side.        Dorsalis pedis pulses are 2+ on the right side and 2+ on the left side.        Posterior tibial pulses are 2+ on the right side and 2+ on the left side.      Heart sounds: Normal heart sounds.   Pulmonary:      Effort: Pulmonary effort is normal.      Breath sounds: Normal breath sounds.   Abdominal:      General: Bowel sounds are normal.      Palpations: Abdomen is soft.   Musculoskeletal:      Right lower leg: No edema.      Left lower leg: No edema.   Skin:     General: Skin is warm and dry.      Capillary Refill: Capillary refill takes less than 2 seconds.   Neurological:      General: No focal deficit present.      Mental Status: She is alert and oriented to person, place, and time.   Psychiatric:         Mood and Affect: Mood normal.         Thought Content: Thought content normal.         Procedure   Procedures       Assessment & Plan    Diagnosis Plan   1. Coronary artery disease involving native coronary artery of native heart, unspecified whether angina present        2. Precordial pain  ranolazine (Ranexa) 500 MG 12 hr tablet      3. Palpitations  Holter Monitor - 72 Hour Up To 15 Days      4. Mixed hyperlipidemia        5. Benign hypertension        6. Paroxysmal SVT (supraventricular tachycardia)          Plan:  1.  Coronary artery disease: Continue medical management " including aspirin, statin, amlodipine, metoprolol and Hyzaar.  Left heart cath showed moderate CAD with RCA 60% mid stenosis.  2.  Precordial pain: She has continued to have a couple episodes of chest discomfort since her last visit.  Continue amlodipine.  She has been on isosorbide in the past and did not tolerate this medication.  Will add Ranexa 500 mg p.o. twice daily.  Will see her back to evaluate effectiveness.  3.  Palpitations: The patient has had episodes of paroxysmal SVT on a previous monitor.  She is having returning palpitation symptoms which are increasing in frequency.  Will place a 2-week Holter monitor to further evaluate.  Continue beta-blocker.  4.  Mixed hyperlipidemia: Continue statin.  Will periodically review lipid panel.  5.  Hypertension: Blood pressure borderline in the office but the patient monitors this at home and states it typically is around 115/70.  Will continue current antihypertensives for now.  The patient was instructed to monitor BP at home and to notify our office if systolic BP is consistently greater than 130-135 systolic.  Goal BP is 130-135/70-80.  Weight    Return in about 3 months (around 9/18/2025).    Ernestina Wisdom  reports that she has never smoked. She has been exposed to tobacco smoke. She has never used smokeless tobacco.    BMI is >= 30 and <35. (Class 1 Obesity). The following options were offered after discussion;: referral to primary care    Advance Care Planning  ACP discussion was held with the patient during this visit. Patient does not have an advance directive, declines further assistance.       MEDS ORDERED DURING VISIT:  New Medications Ordered This Visit   Medications    ranolazine (Ranexa) 500 MG 12 hr tablet     Sig: Take 1 tablet by mouth 2 (Two) Times a Day.     Dispense:  60 tablet     Refill:  11       DISCONTINUED MEDS DURING VISIT:   There are no discontinued medications.       This document has been electronically signed by Breanna Garcia  APRN  June 18, 2025 14:36 EDT    Dictated Utilizing Dragon Dictation: Part of this note may be an electronic transcription/translation of spoken language to printed text using the Dragon Dictation System

## 2025-06-18 NOTE — TELEPHONE ENCOUNTER
Prior authorization request received for Ranexa. Authorization submitted through Cover [employer] Med[employer]. Status pending.        Message from plan...  Approved today by [employer] 2017  PA Case: [id number]  Status: Approved  Coverage Starts on: 3/19/2025   Coverage Ends on: 6/18/2026